# Patient Record
Sex: MALE | Race: BLACK OR AFRICAN AMERICAN | Employment: UNEMPLOYED | URBAN - METROPOLITAN AREA
[De-identification: names, ages, dates, MRNs, and addresses within clinical notes are randomized per-mention and may not be internally consistent; named-entity substitution may affect disease eponyms.]

---

## 2017-04-15 PROCEDURE — 99283 EMERGENCY DEPT VISIT LOW MDM: CPT

## 2017-04-16 ENCOUNTER — HOSPITAL ENCOUNTER (EMERGENCY)
Age: 57
Discharge: HOME | End: 2017-04-16
Attending: EMERGENCY MEDICINE | Admitting: EMERGENCY MEDICINE
Payer: SELF-PAY

## 2017-04-16 VITALS
TEMPERATURE: 98 F | HEIGHT: 71 IN | HEART RATE: 63 BPM | DIASTOLIC BLOOD PRESSURE: 81 MMHG | RESPIRATION RATE: 16 BRPM | BODY MASS INDEX: 27.44 KG/M2 | OXYGEN SATURATION: 98 % | WEIGHT: 196 LBS | SYSTOLIC BLOOD PRESSURE: 122 MMHG

## 2017-04-16 DIAGNOSIS — B02.9 HERPES ZOSTER WITHOUT COMPLICATION: Primary | ICD-10-CM

## 2017-04-16 PROCEDURE — 74011250637 HC RX REV CODE- 250/637: Performed by: EMERGENCY MEDICINE

## 2017-04-16 RX ORDER — OXYCODONE AND ACETAMINOPHEN 5; 325 MG/1; MG/1
1 TABLET ORAL
Qty: 12 TAB | Refills: 0 | Status: SHIPPED | OUTPATIENT
Start: 2017-04-16 | End: 2022-09-12 | Stop reason: ALTCHOICE

## 2017-04-16 RX ORDER — OXYCODONE AND ACETAMINOPHEN 5; 325 MG/1; MG/1
1 TABLET ORAL
Status: COMPLETED | OUTPATIENT
Start: 2017-04-16 | End: 2017-04-16

## 2017-04-16 RX ADMIN — OXYCODONE HYDROCHLORIDE AND ACETAMINOPHEN 1 TABLET: 5; 325 TABLET ORAL at 04:42

## 2017-04-16 NOTE — ED PROVIDER NOTES
HPI Comments:   4:17 AM Atlantas Hattie Sandifer is a 61 y.o. male, who presents to the ED for the evaluation of painful pruritic rash to his chest and back, onset about 4 days ago. Denies taking anything for pain. No relieving or exacerbating factors. No other complaints at this time. PCP: None     The history is provided by the patient. History reviewed. No pertinent past medical history. History reviewed. No pertinent surgical history. History reviewed. No pertinent family history. Social History     Social History    Marital status: SINGLE     Spouse name: N/A    Number of children: N/A    Years of education: N/A     Occupational History    Not on file. Social History Main Topics    Smoking status: Former Smoker    Smokeless tobacco: Not on file    Alcohol use Yes    Drug use: Not on file    Sexual activity: Not on file     Other Topics Concern    Not on file     Social History Narrative    No narrative on file         ALLERGIES: Review of patient's allergies indicates no known allergies. Review of Systems   Constitutional: Negative for fever. HENT: Negative for congestion. Respiratory: Negative for cough and shortness of breath. Cardiovascular: Negative for chest pain and leg swelling. Gastrointestinal: Negative for abdominal pain, nausea and vomiting. Genitourinary: Negative for dysuria. Musculoskeletal: Positive for myalgias. Skin: Positive for rash. Neurological: Negative for light-headedness and headaches. All other systems reviewed and are negative. Vitals:    04/15/17 2323 04/16/17 0502   BP: 128/78 122/81   Pulse: 97 63   Resp: 18 16   Temp: 98.2 °F (36.8 °C) 98 °F (36.7 °C)   SpO2: 96% 98%   Weight: 88.9 kg (196 lb)    Height: 5' 11\" (1.803 m)         96% within normal limits     Physical Exam   Constitutional: He is oriented to person, place, and time. No distress. HENT:   Head: Atraumatic.    Eyes: Conjunctivae are normal.   Neck: Normal range of motion. Neck supple. Cardiovascular: Normal rate, regular rhythm and normal heart sounds. Pulmonary/Chest: Effort normal and breath sounds normal. No respiratory distress. He exhibits no tenderness. Abdominal: Soft. Bowel sounds are normal. He exhibits no distension. There is no tenderness. There is no rebound and no guarding. Musculoskeletal: Normal range of motion. He exhibits no edema or tenderness. Neurological: He is alert and oriented to person, place, and time. Skin: Skin is warm and dry. Rash noted. Zoster rash distributed along T4-T5   Psychiatric: He has a normal mood and affect. Nursing note and vitals reviewed. MDM  Number of Diagnoses or Management Options  Herpes zoster without complication:   Diagnosis management comments: Jesse Hyde is a 61 y.o. male presenting with zoster x 4 days. No further work up indicated at this time. Handout provided. Return precautions discussed. Patient stated verbal understanding and agrees with course and plan. ED Course       Procedures           Vitals:  Patient Vitals for the past 12 hrs:   Temp Pulse Resp BP SpO2   04/16/17 0502 98 °F (36.7 °C) 63 16 122/81 98 %   04/15/17 2323 98.2 °F (36.8 °C) 97 18 128/78 96 %   96% within normal limits       Progress notes, Consult notes or additional Procedure notes:   4:22 AM: I have reassessed the patient and discussed their results and diagnosis. Pt will be discharged in stable condition. Patient understands and verbalizes agreement with plan. Disposition:  Diagnosis:   1. Herpes zoster without complication        Disposition: discharged home in stable condition        SCRIBE ATTESTATION STATEMENT  Documented by: Patrick Garcia. Zakia Escalante for, and in the presence of, Constantine Mortimer, MD 4:18 AM     Signed by: Patrick Garcia.  Stacy Ray, 4/16/2017 4:18 AM     PROVIDER ATTESTATION STATEMENT  I personally performed the services described in the documentation, reviewed the documentation, as recorded by the scribe in my presence, and it accurately and completely records my words and actions.   Rin Goodson MD

## 2017-08-11 NOTE — DISCHARGE INSTRUCTIONS

## 2019-01-15 ENCOUNTER — HOSPITAL ENCOUNTER (OUTPATIENT)
Dept: PHYSICAL THERAPY | Age: 59
Discharge: HOME | End: 2019-01-15
Payer: MEDICAID

## 2019-01-15 PROCEDURE — 97530 THERAPEUTIC ACTIVITIES: CPT

## 2019-01-15 PROCEDURE — 97162 PT EVAL MOD COMPLEX 30 MIN: CPT

## 2019-01-15 NOTE — PROGRESS NOTES
PT DAILY TREATMENT NOTE 3-16    Patient Name: Kevin Fitzpatrick  Date:1/15/2019  : 1960  [x]  Patient  Verified  Payor: BLUE CROSS MEDICAID / Plan: [address] [place] [address] / Product Type: Managed Care Medicaid /    In time:12:20 pm  Out time:1:00 pm   Total Treatment Time (min): 40  Visit #: 1 of 10    Treatment Area: No admission diagnoses are documented for this encounter.     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 with exception of left thumb  Pt reported that had sepsis due to tick bite - per pt had infection transferred from rabit that tick had bitten previously   Pt reports that has to walk, stand and ascend<>descend stairs  Pt expressed goal of going to [employer] and progressing exercises  Has SPC that uses daily  Has electric WC at home and rollator   orthotics does prosthetics  Has locking pin prosthetic   Has frequent phantom limb pain primarily in LE  PLOF ambulation with SPC with intermittent use of electric wheelchair      Pain:  _7__/10 max       __4-5_/10 min     _6___/10 now    Location: primarily in LE, residual limbs, hands, finger tips     [] Sharp    [] Dull      [] Burning     []  Aching     [] Throbbing      [x] Tingling     [x] Other:  numbness      [x]  Constant                   [] Intermittent        Previous treatment:   Inpatient rehab, outpatient rehab    PMHX: PMHx/Surgical Hx:  please see past medical hx    Social/Recreation/Work: Work Hx: pt currently on disability - wants to return to [facility]  Living Situation: lives with roommate on 2nd floor apartment  Recreational Activities: pt expressed that wants to progress exercise routine at [address] [facility] [address]   Prior to infection basketball      Patient Goal(s): \"I want to get stronger. Help me with a routine at the [facility]. \"    Barriers: []pain []financial []time []transportation []other  Motivation: high  Substance use: []Alcohol []Tobacco []other:   FABQ Score: []low []elevate  Cognition: A & O x 4  Other      OBJECTIVE    30 min [x]Eval                  []Re-Eval              With   [] TE   [x] TA - 10 min    [] neuro   [] other: Patient Education: [x] Review HEP    [] Progressed/Changed HEP based on:   [] positioning   [] body mechanics   [] transfers   [] heat/ice application    [x] other:  Pt education regarding exam findings, anatomy involved and POC      Other Objective/Functional Measures: Movement/gait:  Walks intermittently with SPC - reports that primarily uses SPC in right hand when uses it     Visual Inspection: Thoracic: flattened  Lumbar: appears mildly decreased  Shoulder/Scapula: abducted bilaterally  Stands with slight hip flexion and forward lean  Incisions: residual limbs well healed, no skin breakdown  Left residual limb longer than right     Palpation:                            AROM/PROM Right Left   Hip Flex 110 100   Hip Ext 10 4                         Strength Right Left   Quad set strong strong   Hip flex 5 5   Hip ext  4 4   Glut med 4 4   Glut max 4 4            Special Tests Right Left                                   Balance Right Left   SLS unable unable        Romberg EO: 30 sec     Romberg EC: 13 sec                     Other Comments: Standing Forward Flexion 6 in with left lean and right hand on prosthetic  5 time sit<>stand: 24.28 sec from 18 in surface with decreased control of decent. Heart Rate: 66 bpm  BP: 118/80 mmHg   HG IR Right: 45 Left 52      Pain Level (0-10 scale) post treatment: 6    ASSESSMENT/Changes in Function:   Pt is a 60 y.o. Male presenting to PT with expressed goal of increasing strength and improving gait.  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 Pt reports result of sepsis following tick bite in 2017. Pt presents to PT with bilateral BKA and use of prosthetics and SPC for ambulation. Prior to sepsis pt was an active adult with no medical comorbidities. Objective findings include BP and HR WLF, residual limbs well healed and proficient with donning/doffing of prosthetics. pt is lacking Hip Ext bilaterally and expect significant glut and hamstring inhibition. Pt also presents with decreased HG IR and increased thoracic lumbar ext. Pt could benefit from skilled PT to address posture, ROM, gait and strength. Patient will continue to benefit from skilled PT services to modify and progress therapeutic interventions, address functional mobility deficits, address ROM deficits, address strength deficits, analyze and address soft tissue restrictions, analyze and cue movement patterns, analyze and modify body mechanics/ergonomics, assess and modify postural abnormalities, address imbalance/dizziness and instruct in home and community integration to attain remaining goals. [x]  See Plan of Care  []  See progress note/recertification  []  See Discharge Summary         Progress towards goals / Updated goals:  Short Term Goals: STG- To be accomplished in 2 week(s):  1. Pt will be independent with HEP to encourage prophylaxis. Eval:held due to time  Current: NA    Long Term Goals: LTG- To be accomplished in 4 week(s):  1. Pt will demonstrate ability to bridge to full height >10 times without use of lumbar paraspinals to indicate functional glut and hamstring strength. .  Eval:increased use of paraspinals   Current: NA    2. Pt will be able to ambulate >10 minutes with LRAD and without pain. .  Eval:tolerance at 5-7 minutes  Current: NA    3. Pt will improve 5 time sit<>stand test to 15 sec without use of E to indicate imporved functional mobility.   Eval:24.28 sec from 18 in surface with decreased control of decent. Current: NA        PLAN  [x]  Upgrade activities as tolerated     []  Continue plan of care  []  Update interventions per flow sheet       []  Discharge due to:_  []  Other:_      Ernst Lopez PT, DPT 1/15/2019  12:23 PM    No future appointments.

## 2019-01-16 NOTE — PROGRESS NOTES
[facility] at the [address], [place], [address] [facility]  Phone: [phone]      Fax:  [phone]       Plan of Care/ Statement of Necessity for Physical Therapy Services      Patient name: Bobby Bullard Start of Care: 1/15/2019   Referral source: Referred, Self, MD : 1960    Medical Diagnosis: Gait abnormality [R26.9]   Onset Date:    Treatment Diagnosis: gait    Prior Hospitalization: see medical history Provider#: [id number]   Medications: Verified on Patient summary List    Comorbidities: hx of sepsis, previous surgery   Prior Level of Function: ambulation with SPC with intermittent use of electric wheelchair      The Plan of Care and following information is based on the information from the initial evaluation. Assessment/ key information:   Pt is a 60 y.o. Male presenting to PT with expressed goal of increasing strength and improving gait. 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 Pt reports result of sepsis following tick bite in 2017. Pt presents to PT with bilateral BKA and use of prosthetics and SPC for ambulation. Prior to sepsis pt was an active adult with no medical comorbidities. Objective findings include BP and HR WLF, residual limbs well healed and proficient with donning/doffing of prosthetics. pt is lacking Hip Ext bilaterally and expect significant glut and hamstring inhibition. Pt also presents with decreased HG IR and increased thoracic lumbar ext. Pt could benefit from skilled PT to address posture, ROM, gait and strength.      Evaluation Complexity History MEDIUM  Complexity : 1-2 comorbidities / personal factors will impact the outcome/ POC ; Examination HIGH Complexity : 4+ Standardized tests and measures addressing body structure, function, activity limitation and / or participation in recreation  ;Presentation MEDIUM Complexity : Evolving with changing characteristics ;Clinical Decision Making MEDIUM Complexity : FOTO score of 26-74  Overall Complexity Rating: MEDIUM  Problem List: pain affecting function, decrease ROM, decrease strength, impaired gait/ balance, decrease ADL/ functional abilitiies, decrease activity tolerance, decrease flexibility/ joint mobility and decrease transfer abilities   Treatment Plan may include any combination of the following: Therapeutic exercise, Therapeutic activities, Neuromuscular re-education, Physical agent/modality, Gait/balance training, Manual therapy, Patient education, Self Care training, Functional mobility training and Stair training  Patient / Family readiness to learn indicated by: asking questions, trying to perform skills and interest  Persons(s) to be included in education: patient (P)  Barriers to Learning/Limitations: None  Patient Goal (s): I want to get stronger. Help me with a routine at the [facility].   Patient Self Reported Health Status: good  Rehabilitation Potential: good    Short Term Goals: STG- To be accomplished in 2 week(s):  1. Pt will be independent with HEP to encourage prophylaxis. Eval:held due to time  Current: NA     Long Term Goals: LTG- To be accomplished in 4 week(s):  1. Pt will demonstrate ability to bridge to full height >10 times without use of lumbar paraspinals to indicate functional glut and hamstring strength. .  Eval:increased use of paraspinals   Current: NA     2.  Pt will be able to ambulate >10 minutes with LRAD and without pain. .  Eval:tolerance at 5-7 minutes  Current: NA     3.  Pt will improve 5 time sit<>stand test to 15 sec without use of E to indicate imporved functional mobility. Eval:24.28 sec from 18 in surface with decreased control of decent. Current: NA    Frequency / Duration: Patient to be seen 2 times per week for 4 weeks.     Patient/ Caregiver education and instruction: Diagnosis, prognosis, self care, activity modification and exercises   [x]  Plan of care has been reviewed with PTA    Martha Ferris, PT, DPT 1/15/2019 7:28 PM  _____________________________________________________________________  I certify that the above Therapy Services are being furnished while the patient is under my care. I agree with the treatment plan and certify that this therapy is necessary.     Physician's Signature:____________Date:_________TIME:________    [employer], Date and Time must be completed for valid certification **    Please sign and return to [facility] at the [address], [place], [address] [facility]       Phone: [phone]      Fax:  [phone]

## 2019-01-23 ENCOUNTER — HOSPITAL ENCOUNTER (OUTPATIENT)
Dept: PHYSICAL THERAPY | Age: 59
Discharge: HOME | End: 2019-01-23
Payer: MEDICAID

## 2019-01-23 PROCEDURE — 97161 PT EVAL LOW COMPLEX 20 MIN: CPT

## 2019-01-23 PROCEDURE — 97110 THERAPEUTIC EXERCISES: CPT

## 2019-01-23 PROCEDURE — 97112 NEUROMUSCULAR REEDUCATION: CPT

## 2019-01-23 PROCEDURE — 97530 THERAPEUTIC ACTIVITIES: CPT

## 2019-01-23 NOTE — PROGRESS NOTES
PT DAILY TREATMENT NOTE     Patient Name: Kimmy Sosa  Date:2019  : 1960  [x]  Patient  Verified  Payor: BLUE CROSS MEDICAID / Plan: [facility] / Product Type: Managed Care Medicaid /    In time:12:00 pm  Out time:1:03 pm   Total Treatment Time (min): 60 (waiting on PT)  Visit #: 2 of 8    Treatment Area: Gait abnormality [R26.9]    SUBJECTIVE  Pain Level (0-10 scale): 0  Any medication changes, allergies to medications, adverse drug reactions, diagnosis change, or new procedure performed?: [x] No    [] Yes (see summary sheet for update)  Subjective functional status/changes:   [] No changes reported  \"You know I am not sure if I have pain. \"    OBJECTIVE    Modality rationale:    Min Type Additional Details    [] Estim:  []Unatt       []IFC  []Premod                        []Other:  []w/ice   []w/heat  Position:  Location:    [] Estim: []Att    []TENS instruct  []NMES                    []Other:  []w/US   []w/ice   []w/heat  Position:  Location:    []  Traction: [] Cervical       []Lumbar                       [] Prone          []Supine                       []Intermittent   []Continuous Lbs:  [] before manual  [] after manual    []  Ultrasound: []Continuous   [] Pulsed                           []1MHz   []3MHz W/cm2:  Location:    []  Iontophoresis with dexamethasone         Location: [] Take home patch   [] In clinic    []  Ice     []  heat  []  Ice massage  []  Laser   []  Anodyne Position:  Location:    []  Laser with stim  []  Other:  Position:  Location:    []  Vasopneumatic Device Pressure:       [] lo [] med [] hi   Temperature: [] lo [] med [] hi   [] Skin assessment post-treatment:  []intact []redness- no adverse reaction    []redness - adverse reaction:     40 min Therapeutic Exercise:  [x] See flow sheet :   Rationale: increase ROM, increase strength, improve coordination, improve balance and increase proprioception to improve the patients ability to perform daily activities with decreased pain and symptom levels    10 min Therapeutic Activity:  [x]  See flow sheet :   Rationale: increase ROM, increase strength, improve coordination, improve balance and increase proprioception  to improve the patients ability to perform daily activities with decreased pain and symptom levels    10 min Neuromuscular Re-education:  [x]  See flow sheet :   Rationale: increase ROM, increase strength, improve coordination, improve balance and increase proprioception  to improve the patients ability to perform daily activities with decreased pain and symptom levels            With   [] TE   [] TA   [] neuro   [] other: Patient Education: [x] Review HEP    [] Progressed/Changed HEP based on:   [] positioning   [] body mechanics   [] transfers   [] heat/ice application    [] other:      Other Objective/Functional Measures:   Unable to perform half-kneeling without external support  Challenged with maintaining thoracic retraction in plank and quadruped     Pain Level (0-10 scale) post treatment: 0    ASSESSMENT/Changes in Function:   Pt appropriately challenged with interventions. Reported glut fatigue with hip hikes. Requested to practice shooting basketball - incorporated as balance retraining. Patient will continue to benefit from skilled PT services to modify and progress therapeutic interventions, address functional mobility deficits, address ROM deficits, address strength deficits, analyze and address soft tissue restrictions, analyze and cue movement patterns, analyze and modify body mechanics/ergonomics, assess and modify postural abnormalities and instruct in home and community integration to attain remaining goals.      []  See Plan of Care  []  See progress note/recertification  []  See Discharge Summary         Progress towards goals / Updated goals:  Short Term Goals: STG- To be accomplished in 2 week(s):  1.  Pt will be independent with HEP to encourage prophylaxis. Eval:held due to time  Current: discussed planks for home     Long Term Goals: LTG- To be accomplished in 4 week(s):  1.  Pt will demonstrate ability to bridge to full height >10 times without use of lumbar paraspinals to indicate functional glut and hamstring strength. .  Eval:increased use of paraspinals   Current: initiated bridging with PPT today - progressing      2.  Pt will be able to ambulate >10 minutes with LRAD and without pain. .  Eval:tolerance at 5-7 minutes  Current: NA     3.  Pt will improve 5 time sit<>stand test to 15 sec without use of E to indicate imporved functional mobility. Eval:24.28 sec from 18 in surface with decreased control of decent.   Current: NA      PLAN  [x]  Upgrade activities as tolerated     []  Continue plan of care  []  Update interventions per flow sheet       []  Discharge due to:_  []  Other:_      Karyn Wolfe PT, DPT 1/23/2019  12:19 PM    Future Appointments   Date Time Provider [place]   1/24/2019 11:30 AM Wilfredo Prakash PT, DPT [facility]   1/28/2019  8:30 AM Wilfredo Prakash PT, DPT [facility]   2/1/2019  8:30 AM Robbie Mae [facility]   2/4/2019  8:30 AM Wilfredo Prakash PT, DPT [facility]   2/7/2019  8:30 AM Robbie Mae [facility]   2/11/2019  8:30 AM Wilfredo Prakash PT, DPT [facility]   2/14/2019  8:30 AM Robbie Mae [facility]

## 2019-01-24 ENCOUNTER — APPOINTMENT (OUTPATIENT)
Dept: PHYSICAL THERAPY | Age: 59
End: 2019-01-24
Payer: MEDICAID

## 2019-01-28 ENCOUNTER — HOSPITAL ENCOUNTER (OUTPATIENT)
Dept: PHYSICAL THERAPY | Age: 59
Discharge: HOME | End: 2019-01-28
Payer: MEDICAID

## 2019-01-28 PROCEDURE — 97530 THERAPEUTIC ACTIVITIES: CPT

## 2019-01-28 PROCEDURE — 97110 THERAPEUTIC EXERCISES: CPT

## 2019-01-28 PROCEDURE — 97112 NEUROMUSCULAR REEDUCATION: CPT

## 2019-01-28 NOTE — PROGRESS NOTES
PT DAILY TREATMENT NOTE     Patient Name: Juanito Martin  Date:2019  : 1960  [x]  Patient  Verified  Payor: BLUE CROSS MEDICAID / Plan: [facility] [employer] / Product Type: Managed Care Medicaid /    In time:8:30 am  Out time:9:30 am  Total Treatment Time (min): 60 (45 min direct PT supervision)  Visit #: 3 of 8    Treatment Area: Gait abnormality [R26.9]    SUBJECTIVE  Pain Level (0-10 scale): 5  Any medication changes, allergies to medications, adverse drug reactions, diagnosis change, or new procedure performed?: [x] No    [] Yes (see summary sheet for update)  Subjective functional status/changes:   [] No changes reported  \"I was sore the next day in my legs and buttocks. \"    OBJECTIVE    Modality rationale:    Min Type Additional Details    [] Estim:  []Unatt       []IFC  []Premod                        []Other:  []w/ice   []w/heat  Position:  Location:    [] Estim: []Att    []TENS instruct  []NMES                    []Other:  []w/US   []w/ice   []w/heat  Position:  Location:    []  Traction: [] Cervical       []Lumbar                       [] Prone          []Supine                       []Intermittent   []Continuous Lbs:  [] before manual  [] after manual    []  Ultrasound: []Continuous   [] Pulsed                           []1MHz   []3MHz W/cm2:  Location:    []  Iontophoresis with dexamethasone         Location: [] Take home patch   [] In clinic    []  Ice     []  heat  []  Ice massage  []  Laser   []  Anodyne Position:  Location:    []  Laser with stim  []  Other:  Position:  Location:    []  Vasopneumatic Device Pressure:       [] lo [] med [] hi   Temperature: [] lo [] med [] hi   [] Skin assessment post-treatment:  []intact []redness- no adverse reaction    []redness - adverse reaction:     30 min Therapeutic Exercise:  [x] See flow sheet :   Rationale: increase ROM, increase strength, improve coordination, improve balance and increase proprioception to improve the patients ability to perform daily activities with decreased pain and symptom levels     15 min Therapeutic Activity:  [x]  See flow sheet :   Rationale: increase ROM, increase strength, improve coordination, improve balance and increase proprioception  to improve the patients ability to perform daily activities with decreased pain and symptom levels     15 min Neuromuscular Re-education:  [x]  See flow sheet :   Rationale: increase ROM, increase strength, improve coordination, improve balance and increase proprioception  to improve the patients ability to perform daily activities with decreased pain and symptom levels          With   [] TE   [] TA   [] neuro   [] other: Patient Education: [x] Review HEP    [] Progressed/Changed HEP based on:   [] positioning   [] body mechanics   [] transfers   [] heat/ice application    [] other:      Other Objective/Functional Measures:   Required HHA with 1/2 kneeling     Pain Level (0-10 scale) post treatment: 5    ASSESSMENT/Changes in Function:   Pt reported increased muscle soreness after last session. Challenged with 1/2 kneeling and maintaining PPT with bridges. Pt requested that PT contact [employer]. Plan to call at earliest convenience and will document in communications log. Patient will continue to benefit from skilled PT services to modify and progress therapeutic interventions, address functional mobility deficits, address ROM deficits, address strength deficits, analyze and address soft tissue restrictions, analyze and cue movement patterns, analyze and modify body mechanics/ergonomics, assess and modify postural abnormalities, address imbalance/dizziness and instruct in home and community integration to attain remaining goals.      []  See Plan of Care  []  See progress note/recertification  []  See Discharge Summary         Progress towards goals / Updated goals:  Short Term Goals: STG- To be accomplished in 2 week(s):  1.  Pt will be independent with HEP to encourage prophylaxis. Eval:held due to time  Current: discussed planks for home     Long Term Goals: LTG- To be accomplished in 4 week(s):  1.  Pt will demonstrate ability to bridge to full height >10 times without use of lumbar paraspinals to indicate functional glut and hamstring strength. .  Eval:increased use of paraspinals   Current:  progressing- reported glut fatigue with activities     2.  Pt will be able to ambulate >10 minutes with LRAD and without pain. .  Eval:tolerance at 5-7 minutes  Current: NA     3.  Pt will improve 5 time sit<>stand test to 15 sec without use of E to indicate imporved functional mobility. Eval:24.28 sec from 18 in surface with decreased control of decent.   Current: NA    PLAN  [x]  Upgrade activities as tolerated     []  Continue plan of care  []  Update interventions per flow sheet       []  Discharge due to:_  []  Other:_      Shaquille Conway PT, DPT 1/28/2019  8:38 AM    Future Appointments   Date Time Provider Miguel Watt   2/1/2019  8:30 AM Isabella Mae [facility]   2/4/2019  8:30 AM Yuliet Olson PT, DPT [facility]   2/7/2019  8:30 AM Isabella Mae THE [facility]   2/11/2019  8:30 AM Yuliet Olson PT, DPT [facility]PS[facility]AN[facility]   2/14/2019  8:30 AM Isabella Mae THE [facility]

## 2019-02-01 ENCOUNTER — HOSPITAL ENCOUNTER (OUTPATIENT)
Dept: PHYSICAL THERAPY | Age: 59
Discharge: HOME | End: 2019-02-01
Payer: MEDICAID

## 2019-02-01 PROCEDURE — 97112 NEUROMUSCULAR REEDUCATION: CPT

## 2019-02-01 PROCEDURE — 97530 THERAPEUTIC ACTIVITIES: CPT

## 2019-02-01 PROCEDURE — 97110 THERAPEUTIC EXERCISES: CPT

## 2019-02-01 NOTE — PROGRESS NOTES
PT DAILY TREATMENT NOTE 1216    Patient Name: Lars Leisa  Date:2019  : 1960  [x]  Patient  Verified  Payor: BLUE CROSS MEDICAID / Plan: [facility] [employer] / Product Type: Managed Care Medicaid /    In time:8:30  Out time:9:35  Total Treatment Time (min): 65  Visit #: 4 of 8    Treatment Area: Gait abnormality [R26.9]    SUBJECTIVE  Pain Level (0-10 scale): 6-7  Any medication changes, allergies to medications, adverse drug reactions, diagnosis change, or new procedure performed?: [x] No    [] Yes (see summary sheet for update)  Subjective functional status/changes:   [] No changes reported  \"those planks are hard. I need the light on my wc for nightime when I do [employer] study and community things. \"    OBJECTIVE      35 min Therapeutic Exercise:  [x] See flow sheet :   Rationale: increase ROM and increase strength to improve the patients ability to perform daily activities with decreased pain and symptom levels    10 min Therapeutic Activity:  [x]  See flow sheet :   Rationale: improve coordination, improve balance and increase proprioception  to improve the patients ability to perform daily activities with decreased pain and symptom levels     20 min Neuromuscular Re-education:  [x]  See flow sheet :   Rationale: increase strength, improve coordination, improve balance and increase proprioception  to improve the patients ability to perform daily activities with decreased pain and symptom levels          With   [] TE   [] TA   [] neuro   [] other: Patient Education: [x] Review HEP    [] Progressed/Changed HEP based on:   [] positioning   [] body mechanics   [] transfers   [] heat/ice application    [] other:      Other Objective/Functional Measures:   Increased lumbar extension in QP  No LOB with rotational med ball in // bars however SBA by therapist     Pain Level (0-10 scale) post treatment: 5-5    ASSESSMENT/Changes in Function: Good tolerance to exercises with fatigue in gluts reported. Continues to be challenged with maintaining neutral spine with QP. Patient will continue to benefit from skilled PT services to modify and progress therapeutic interventions, address functional mobility deficits, address strength deficits, analyze and cue movement patterns, analyze and modify body mechanics/ergonomics, assess and modify postural abnormalities, address imbalance/dizziness and instruct in home and community integration to attain remaining goals. []  See Plan of Care  []  See progress note/recertification  []  See Discharge Summary         Progress towards goals / Updated goals:  Short Term Goals: STG- To be accomplished in 2 week(s):  1.  Pt will be independent with HEP to encourage prophylaxis. Eval:held due to time  Current: partial compliance      Long Term Goals: LTG- To be accomplished in 4 week(s):  1.  Pt will demonstrate ability to bridge to full height >10 times without use of lumbar paraspinals to indicate functional glut and hamstring strength. .  Eval:increased use of paraspinals   Current:  progressing- reported glut fatigue with activities     2.  Pt will be able to ambulate >10 minutes with LRAD and without pain. .  Eval:tolerance at 5-7 minutes  Current: NA     3.  Pt will improve 5 time sit<>stand test to 15 sec without use of E to indicate imporved functional mobility. Eval:24.28 sec from 18 in surface with decreased control of decent.   Current: NA      PLAN  [x]  Upgrade activities as tolerated     [x]  Continue plan of care  []  Update interventions per flow sheet       []  Discharge due to:_   []  Other:_      Scar Del Rosario 2/1/2019  8:39 AM    Future Appointments   Date Time Provider Miguel Watt   2/4/2019  8:30 AM Diana Lezama PT, DPT [facility]PT[facility]   2/7/2019  8:30 AM Vivian Mae [facility]   2/11/2019  8:30 AM Diana Lezama PT, DPT [facility]   2/14/2019  8:30 AM Vivian MaeHPSTEFANO [facility]   2/18/2019  8:30 AM Diana Lezama PT, DPT [facility]

## 2019-02-04 ENCOUNTER — APPOINTMENT (OUTPATIENT)
Dept: PHYSICAL THERAPY | Age: 59
End: 2019-02-04
Payer: MEDICAID

## 2019-02-07 ENCOUNTER — HOSPITAL ENCOUNTER (OUTPATIENT)
Dept: PHYSICAL THERAPY | Age: 59
Discharge: HOME | End: 2019-02-07
Payer: MEDICAID

## 2019-02-07 PROCEDURE — 97110 THERAPEUTIC EXERCISES: CPT

## 2019-02-07 PROCEDURE — 97530 THERAPEUTIC ACTIVITIES: CPT

## 2019-02-07 NOTE — PROGRESS NOTES
PT DAILY TREATMENT NOTE     Patient Name: Cira Garzon  Date:2019  : 1960  [x]  Patient  Verified  Payor: BLUE CROSS MEDICAID / Plan: [facility] [employer] [employer]ERS [employer] / Product Type: Managed Care Medicaid /    In time:8:30  Out time:9:33  Total Treatment Time (min): 63  Visit #: 5 of 8    Treatment Area: Gait abnormality [R26.9]    SUBJECTIVE  Pain Level (0-10 scale): 5  Any medication changes, allergies to medications, adverse drug reactions, diagnosis change, or new procedure performed?: [x] No    [] Yes (see summary sheet for update)  Subjective functional status/changes:   [] No changes reported  \"I always have pain. \"    OBJECTIVE        48 min Therapeutic Exercise:  [x] See flow sheet :   Rationale: increase ROM and increase strength to improve the patients ability to perform daily activities with decreased pain and symptom levels    15 min Therapeutic Activity:  [x]  See flow sheet :   Rationale: improve coordination, improve balance and increase proprioception  to improve the patients ability to perform daily activities with decreased pain and symptom levels           With   [] TE   [] TA   [] neuro   [] other: Patient Education: [x] Review HEP    [] Progressed/Changed HEP based on:   [] positioning   [] body mechanics   [] transfers   [] heat/ice application    [] other:      Other Objective/Functional Measures:   SBA with rotational med ball on foam  Increased lumbar extension with QP still  Decreased control with sit to stands     Pain Level (0-10 scale) post treatment: 4    ASSESSMENT/Changes in Function: Good tolerance to exercises with decreased pain at end of session. Still very challenged in QP with maintaining neutral spine. Unable to control descent with sit to stands with cues to shift more anteriorly.      Patient will continue to benefit from skilled PT services to modify and progress therapeutic interventions, address functional mobility deficits, address strength deficits, analyze and modify body mechanics/ergonomics, assess and modify postural abnormalities, address imbalance/dizziness and instruct in home and community integration to attain remaining goals. []  See Plan of Care  []  See progress note/recertification  []  See Discharge Summary         Progress towards goals / Updated goals:  Short Term Goals: STG- To be accomplished in 2 week(s):  1.  Pt will be independent with HEP to encourage prophylaxis. Eval:held due to time  Current: partial compliance      Long Term Goals: LTG- To be accomplished in 4 week(s):  1.  Pt will demonstrate ability to bridge to full height >10 times without use of lumbar paraspinals to indicate functional glut and hamstring strength. .  Eval:increased use of paraspinals   Current:  progressing- reported glut fatigue with activities     2.  Pt will be able to ambulate >10 minutes with LRAD and without pain. .  Eval:tolerance at 5-7 minutes  Current: NA     3.  Pt will improve 5 time sit<>stand test to 15 sec without use of E to indicate imporved functional mobility. Eval:24.28 sec from 18 in surface with decreased control of decent.   Current: continues to be challenged with controlling descent        PLAN  [x]  Upgrade activities as tolerated     [x]  Continue plan of care  []  Update interventions per flow sheet       []  Discharge due to:_  []  Other:_      Evita Tolentino 2/7/2019  9:03 AM    Future Appointments   Date Time Provider Miguel Watt   2/8/2019  1:00 PM Rosina Yancey PT, DPT [facility]   2/11/2019  8:30 AM Rosina Yancey PT, DPT [facility]   2/14/2019  8:30 AM Earnest Mae THE [facility]   2/18/2019  8:30 AM Rosina Yancey PT, DPT [facility]

## 2019-02-08 ENCOUNTER — HOSPITAL ENCOUNTER (OUTPATIENT)
Dept: PHYSICAL THERAPY | Age: 59
Discharge: HOME | End: 2019-02-08
Payer: MEDICAID

## 2019-02-08 PROCEDURE — 97110 THERAPEUTIC EXERCISES: CPT

## 2019-02-08 PROCEDURE — 97530 THERAPEUTIC ACTIVITIES: CPT

## 2019-02-08 PROCEDURE — 97112 NEUROMUSCULAR REEDUCATION: CPT

## 2019-02-08 NOTE — PROGRESS NOTES
PT DAILY TREATMENT NOTE     Patient Name: Octavio Barkley  Date:2019  : 1960  [x]  Patient  Verified  Payor: BLUE CROSS MEDICAID / Plan: [facility] HEALTHKEEPERS PLUS / Product Type: Managed Care Medicaid /    In time:1:00 pm  Out time:1:53 pm   Total Treatment Time (min): 48  Visit #: 6 of 8    Treatment Area: Gait abnormality [R26.9]    SUBJECTIVE  Pain Level (0-10 scale): 6  Any medication changes, allergies to medications, adverse drug reactions, diagnosis change, or new procedure performed?: [x] No    [] Yes (see summary sheet for update)  Subjective functional status/changes:   [] No changes reported  \"My knee joint (right) was hurting last night. \"    OBJECTIVE    Modality rationale:    Min Type Additional Details    [] Estim:  []Unatt       []IFC  []Premod                        []Other:  []w/ice   []w/heat  Position:  Location:    [] Estim: []Att    []TENS instruct  []NMES                    []Other:  []w/US   []w/ice   []w/heat  Position:  Location:    []  Traction: [] Cervical       []Lumbar                       [] Prone          []Supine                       []Intermittent   []Continuous Lbs:  [] before manual  [] after manual    []  Ultrasound: []Continuous   [] Pulsed                           []1MHz   []3MHz W/cm2:  Location:    []  Iontophoresis with dexamethasone         Location: [] Take home patch   [] In clinic    []  Ice     []  heat  []  Ice massage  []  Laser   []  Anodyne Position:  Location:    []  Laser with stim  []  Other:  Position:  Location:    []  Vasopneumatic Device Pressure:       [] lo [] med [] hi   Temperature: [] lo [] med [] hi   [] Skin assessment post-treatment:  []intact []redness- no adverse reaction    []redness - adverse reaction:     33 min Therapeutic Exercise:  [x] See flow sheet :   Rationale: increase ROM, increase strength, improve coordination, improve balance and increase proprioception to improve the patients ability to perform daily activities with decreased pain and symptom levels     10 min Therapeutic Activity:  [x]  See flow sheet :   Rationale: increase ROM, increase strength, improve coordination, improve balance and increase proprioception  to improve the patients ability to perform daily activities with decreased pain and symptom levels     10 min Neuromuscular Re-education:  [x]  See flow sheet :   Rationale: increase ROM, increase strength, improve coordination, improve balance and increase proprioception  to improve the patients ability to perform daily activities with decreased pain and symptom levels                                             With   [] TE   [] TA   [] neuro   [] other: Patient Education: [x] Review HEP    [] Progressed/Changed HEP based on:   [] positioning   [] body mechanics   [] transfers   [] heat/ice application    [] other:      Other Objective/Functional Measures:   CGA with rot balance     Pain Level (0-10 scale) post treatment: 5    ASSESSMENT/Changes in Function:   Pt requested letter of necessity for WC headlights. Tolerated treatment session well. Very challenged with quadruped and planks. Patient will continue to benefit from skilled PT services to modify and progress therapeutic interventions, address functional mobility deficits, address ROM deficits, address strength deficits, analyze and address soft tissue restrictions, analyze and cue movement patterns, analyze and modify body mechanics/ergonomics, assess and modify postural abnormalities, address imbalance/dizziness and instruct in home and community integration to attain remaining goals. []  See Plan of Care  []  See progress note/recertification  []  See Discharge Summary         Progress towards goals / Updated goals:  Short Term Goals: STG- To be accomplished in 2 week(s):  1.  Pt will be independent with HEP to encourage prophylaxis.   Eval:held due to time  Current: partial compliance      Long Term Goals: LTG- To be accomplished in 4 week(s):  1.  Pt will demonstrate ability to bridge to full height >10 times without use of lumbar paraspinals to indicate functional glut and hamstring strength. .  Eval:increased use of paraspinals   Current:  progressing- reported glut fatigue with activities     2.  Pt will be able to ambulate >10 minutes with LRAD and without pain. .  Eval:tolerance at 5-7 minutes  Current: NA     3.  Pt will improve 5 time sit<>stand test to 15 sec without use of E to indicate imporved functional mobility. Eval:24.28 sec from 18 in surface with decreased control of decent.   Current: continues to be challenged with controlling descent      PLAN  [x]  Upgrade activities as tolerated     []  Continue plan of care  []  Update interventions per flow sheet       []  Discharge due to:_  []  Other:_      Elizabeth Ramsay, PT, DPT 2/8/2019  1:23 PM    Future Appointments   Date Time Provider Miguel Watt   2/11/2019  8:30 AM Alyse Arrieta PT, DPT [facility]   2/14/2019  8:30 AM Kelvin MaeHPTBW [facility]   2/18/2019  8:30 AM Alyse Arrieta PT, DPT [facility]

## 2019-02-11 ENCOUNTER — HOSPITAL ENCOUNTER (OUTPATIENT)
Dept: PHYSICAL THERAPY | Age: 59
Discharge: HOME | End: 2019-02-11
Payer: MEDICAID

## 2019-02-11 PROCEDURE — 97530 THERAPEUTIC ACTIVITIES: CPT

## 2019-02-11 PROCEDURE — 97110 THERAPEUTIC EXERCISES: CPT

## 2019-02-11 PROCEDURE — 97112 NEUROMUSCULAR REEDUCATION: CPT

## 2019-02-11 NOTE — PROGRESS NOTES
PT DAILY TREATMENT NOTE     Patient Name: Kathe Andrews  Date:2019  : 1960  [x]  Patient  Verified  Payor: BLUE CROSS MEDICAID / Plan: [facility] HEALTHKEEPERS PLUS / Product Type: Managed Care Medicaid /    In time:8:30 am  Out time:9:31 am   Total Treatment Time (min): 61  Visit #: 7 of 8    Treatment Area: Gait abnormality [R26.9]    SUBJECTIVE  Pain Level (0-10 scale): 5  Any medication changes, allergies to medications, adverse drug reactions, diagnosis change, or new procedure performed?: [x] No    [] Yes (see summary sheet for update)  Subjective functional status/changes:   [] No changes reported  \"I am about a 5 today. \"    OBJECTIVE    Modality rationale:    Min Type Additional Details    [] Estim:  []Unatt       []IFC  []Premod                        []Other:  []w/ice   []w/heat  Position:  Location:    [] Estim: []Att    []TENS instruct  []NMES                    []Other:  []w/US   []w/ice   []w/heat  Position:  Location:    []  Traction: [] Cervical       []Lumbar                       [] Prone          []Supine                       []Intermittent   []Continuous Lbs:  [] before manual  [] after manual    []  Ultrasound: []Continuous   [] Pulsed                           []1MHz   []3MHz W/cm2:  Location:    []  Iontophoresis with dexamethasone         Location: [] Take home patch   [] In clinic    []  Ice     []  heat  []  Ice massage  []  Laser   []  Anodyne Position:  Location:    []  Laser with stim  []  Other:  Position:  Location:    []  Vasopneumatic Device Pressure:       [] lo [] med [] hi   Temperature: [] lo [] med [] hi   [] Skin assessment post-treatment:  []intact []redness- no adverse reaction    []redness - adverse reaction:     41 min Therapeutic Exercise:  [x] See flow sheet :   Rationale: increase ROM, increase strength, improve coordination, improve balance and increase proprioception to improve the patients ability to perform daily activities with decreased pain and symptom levels     10 min Therapeutic Activity:  [x]  See flow sheet :   Rationale: increase ROM, increase strength, improve coordination, improve balance and increase proprioception  to improve the patients ability to perform daily activities with decreased pain and symptom levels     10 min Neuromuscular Re-education:  [x]  See flow sheet :   Rationale: increase ROM, increase strength, improve coordination, improve balance and increase proprioception  to improve the patients ability to perform daily activities with decreased pain and symptom levels           With   [] TE   [] TA   [] neuro   [] other: Patient Education: [x] Review HEP    [] Progressed/Changed HEP based on:   [] positioning   [] body mechanics   [] transfers   [] heat/ice application    [] other:      Other Objective/Functional Measures:   Sit<>stand 18 in - improved balance      Pain Level (0-10 scale) post treatment: 5    ASSESSMENT/Changes in Function:   Pt reported it is easier to get up and down off of the couch at home. Very challenged with quadruped and 1/2 kneeling. Patient will continue to benefit from skilled PT services to modify and progress therapeutic interventions, address functional mobility deficits, address ROM deficits, address strength deficits, analyze and address soft tissue restrictions, analyze and cue movement patterns, analyze and modify body mechanics/ergonomics, assess and modify postural abnormalities, address imbalance/dizziness and instruct in home and community integration to attain remaining goals. []  See Plan of Care  []  See progress note/recertification  []  See Discharge Summary         Progress towards goals / Updated goals:  Short Term Goals: STG- To be accomplished in 2 week(s):  1.  Pt will be independent with HEP to encourage prophylaxis.   Eval:held due to time  Current: partial compliance      Long Term Goals: LTG- To be accomplished in 4 week(s):  1.  Pt will demonstrate ability to bridge to full height >10 times without use of lumbar paraspinals to indicate functional glut and hamstring strength. .  Eval:increased use of paraspinals   Current:  progressing- reported glut fatigue with activities     2.  Pt will be able to ambulate >10 minutes with LRAD and without pain. .  Eval:tolerance at 5-7 minutes  Current: pt reports toleranceat ~7 min      3.  Pt will improve 5 time sit<>stand test to 15 sec without use of E to indicate imporved functional mobility. Eval:24.28 sec from 18 in surface with decreased control of decent.   Current: progressing with sit<>stand from 18 in surface, able to control descent     PLAN  [x]  Upgrade activities as tolerated     []  Continue plan of care  []  Update interventions per flow sheet       []  Discharge due to:_  []  Other:_      Henrry Ramey, PT, DPT 2/11/2019  8:42 AM    Future Appointments   Date Time Provider Miguel Watt   2/14/2019  8:30 AM Narayan Mae [facility]   2/18/2019  8:30 AM Elizabeth Grier, PT, DPT [facility]PTBW [facility]

## 2019-02-14 ENCOUNTER — HOSPITAL ENCOUNTER (OUTPATIENT)
Dept: PHYSICAL THERAPY | Age: 59
Discharge: HOME | End: 2019-02-14
Payer: MEDICAID

## 2019-02-14 PROCEDURE — 97110 THERAPEUTIC EXERCISES: CPT

## 2019-02-14 PROCEDURE — 97112 NEUROMUSCULAR REEDUCATION: CPT

## 2019-02-14 NOTE — PROGRESS NOTES
[facility] at the [address], [facility], [address] [facility]  Phone: [phone]      Fax:  [phone]    Progress Note  Patient name: Arianna Faustin Start of Care: 1/15/19   Referral source: Referred, MD Romulo : 1960   Medical/Treatment Diagnosis: Gait abnormality [R26.9] Onset Date:     Prior Hospitalization: see medical history Provider#: [id number]   Medications: Verified on Patient Summary List    Comorbidities: hx of sepsis, previous surgery  Prior Level of Function: ambulation with SPC with intermittent use of electric wheelchair    Visits from Start of Care: 8    Missed Visits: 1    Short Term Goals: STG- To be accomplished in 2 week(s):  1.  Pt will be independent with HEP to encourage prophylaxis. Eval:held due to time  Current: compliance per pt report - goal MET     Long Term Goals: LTG- To be accomplished in 4 week(s):  1.  Pt will demonstrate ability to bridge to full height >10 times without use of lumbar paraspinals to indicate functional glut and hamstring strength. .  Eval:increased use of paraspinals   Current: improving height however still using lumbar paraspinals - progressing      2.  Pt will be able to ambulate >10 minutes with LRAD and without pain. .  Eval:tolerance at 5-7 minutes  Current: pt reports tolerance at ~7 min - progressing        3.  Pt will improve 5 time sit<>stand test to 15 sec without use of E to indicate imporved functional mobility. Eval:24.28 sec from 18 in surface with decreased control of decent. Current: 16.01 sec with improving descent  - progressing         Key Functional Changes:  Pt presented to therapy with expressed goal of increasing strength and improving gait. 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 Pt reports result of sepsis following tick bite in 2017. Pt has attended 8 sessions with making some progress towards goals.  Continues to ambulate with SPC at times however able to make short distances without AD. Strength is improving however still challenged with 1/2 kneeling and QP. TUG score of 10 sec demonstrates [facility] ambulation without AD however still challenged with 5x sit to stand without UE with taking 16 seconds today with decreased control with descent still. Challenged with floor to stand transfers with some instability initially upon standing. Able to complete 10 bridges with increased height however still challenged with not using lumbar paraspinals. Pt would benefit from continued skilled PT services to address core strength, LE strength, gait and balance. Updated Goals: to be achieved in 4 weeks:   See unmet above    ASSESSMENT/RECOMMENDATIONS:  [x]Continue therapy per initial plan/protocol at a frequency of  2 x per week for 4 weeks  []Continue therapy with the following recommended changes:_____________________      _____________________________________________________________________  []Discontinue therapy progressing towards or have reached established goals  []Discontinue therapy due to lack of appreciable progress towards goals  []Discontinue therapy due to lack of attendance or compliance  []Await Physician's recommendations/decisions regarding therapy  []Other:________________________________________________________________    Thank you for this referral.   Narayan [facility] 2/14/2019 9:52 AM  NOTE TO PHYSICIAN:  PLEASE COMPLETE THE ORDERS BELOW AND   FAX TO [facility]: [phone]  If you are unable to process this request in 24 hours please contact our office: [phone]        []  I have read the above report and request that my patient continue as recommended.   []  I have read the above report and request that my patient continue therapy with the following changes/special instructions:________________________________________  []I have read the above report and request that my patient be discharged from therapy.     [de-identified] Signature:____________Date:_________TIME:________    [facility], Date and Time must be completed for valid certification **

## 2019-02-14 NOTE — PROGRESS NOTES
PT DAILY TREATMENT NOTE     Patient Name: Bk Mota  Date:2019  : 1960  [x]  Patient  Verified  Payor: BLUE CROSS MEDICAID / Plan: [facility] HEALTHKEEPERS PLUS / Product Type: Managed Care Medicaid /    In time:8:35  Out time:9:30  Total Treatment Time (min): 55  Visit #: 8 of 8    Treatment Area: Gait abnormality [R26.9]    SUBJECTIVE  Pain Level (0-10 scale): 5  Any medication changes, allergies to medications, adverse drug reactions, diagnosis change, or new procedure performed?: [x] No    [] Yes (see summary sheet for update)  Subjective functional status/changes:   [] No changes reported  \"It's still hard to stand up without my hands. \"    OBJECTIVE      40 min Therapeutic Exercise:  [x] See flow sheet :   Rationale: increase ROM and increase strength to improve the patients ability to perform daily activities with decreased pain and symptom levels     15 min Neuromuscular Re-education:  [x]  See flow sheet :   Rationale: increase strength, improve coordination, improve balance and increase proprioception  to improve the patients ability to perform daily activities with decreased pain and symptom levels            With   [] TE   [] TA   [] neuro   [] other: Patient Education: [x] Review HEP    [] Progressed/Changed HEP based on:   [] positioning   [] body mechanics   [] transfers   [] heat/ice application    [] other:      Other Objective/Functional Measures:   TUG 10.98sec, 9.90 sec without SPC   See updated goals below      Pain Level (0-10 scale) post treatment: 7    ASSESSMENT/Changes in Function: Pt presented to therapy with expressed goal of increasing strength and improving gait. 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 Pt reports result of sepsis following tick bite in 2017. Pt has attended 8 sessions with making some progress towards goals.  Continues to ambulate with SPC at times however able to make short distances without AD. Strength is improving however still challenged with 1/2 kneeling and QP. TUG score of 10 sec demonstrates [facility] ambulation without AD however still challenged with 5x sit to stand without UE with taking 16 seconds today with decreased control with descent still. Challenged with floor to stand transfers with some instability initially upon standing. Able to complete 10 bridges with increased height however still challenged with not using lumbar paraspinals. Pt would benefit from continued skilled PT services to address core strength, LE strength, gait and balance. Patient will continue to benefit from skilled PT services to modify and progress therapeutic interventions, address functional mobility deficits, address strength deficits, analyze and cue movement patterns, analyze and modify body mechanics/ergonomics, assess and modify postural abnormalities, address imbalance/dizziness and instruct in home and community integration to attain remaining goals. []  See Plan of Care  []  See progress note/recertification  []  See Discharge Summary         Progress towards goals / Updated goals:  Short Term Goals: STG- To be accomplished in 2 week(s):  1.  Pt will be independent with HEP to encourage prophylaxis. Eval:held due to time  Current: compliance per pt report - goal MET     Long Term Goals: LTG- To be accomplished in 4 week(s):  1.  Pt will demonstrate ability to bridge to full height >10 times without use of lumbar paraspinals to indicate functional glut and hamstring strength. .  Eval:increased use of paraspinals   Current: improving height however still using lumbar paraspinals - progressing      2.  Pt will be able to ambulate >10 minutes with LRAD and without pain. .  Eval:tolerance at 5-7 minutes  Current: pt reports tolerance at ~7 min - progressing        3.  Pt will improve 5 time sit<>stand test to 15 sec without use of E to indicate imporved functional mobility.   Eval:24.28 sec from 18 in surface with decreased control of decent.   Current: 16.01 sec with improving descent  - progressing       PLAN   [x]  Upgrade activities as tolerated     [x]  Continue plan of care  []  Update interventions per flow sheet       []  Discharge due to:_  []  Other:_      Faby Lombardiesidavid 2/14/2019  8:33 AM    Future Appointments   Date Time Provider [place]att   2/18/2019  8:30 AM Russell Pimentel, PT, DPT [facility]

## 2019-02-20 ENCOUNTER — APPOINTMENT (OUTPATIENT)
Dept: PHYSICAL THERAPY | Age: 59
End: 2019-02-20
Payer: MEDICAID

## 2019-02-26 ENCOUNTER — HOSPITAL ENCOUNTER (OUTPATIENT)
Dept: PHYSICAL THERAPY | Age: 59
Discharge: HOME | End: 2019-02-26
Payer: MEDICAID

## 2019-02-26 PROCEDURE — 97110 THERAPEUTIC EXERCISES: CPT

## 2019-02-26 PROCEDURE — 97112 NEUROMUSCULAR REEDUCATION: CPT

## 2019-02-26 PROCEDURE — 97530 THERAPEUTIC ACTIVITIES: CPT

## 2019-02-26 NOTE — PROGRESS NOTES
PT DAILY TREATMENT NOTE     Patient Name: Marisa Buckley  Date:2019  : 1960  [x]  Patient  Verified  Payor: BLUE CROSS MEDICAID / Plan: [facility] [employer] HEALTHKEEPERS PLUS / Product Type: Managed Care Medicaid /    In time:3:30 pm  Out time:4:19 pm   Total Treatment Time (min): 49  Visit #: 9 of 16    Treatment Area: Gait abnormality [R26.9]    SUBJECTIVE  Pain Level (0-10 scale): 5  Any medication changes, allergies to medications, adverse drug reactions, diagnosis change, or new procedure performed?: [x] No    [] Yes (see summary sheet for update)  Subjective functional status/changes:   [] No changes reported  \"Some in my finger tips. I have missed you. \"    OBJECTIVE    Modality rationale:    Min Type Additional Details    [] Estim:  []Unatt       []IFC  []Premod                        []Other:  []w/ice   []w/heat  Position:  Location:    [] Estim: []Att    []TENS instruct  []NMES                    []Other:  []w/US   []w/ice   []w/heat  Position:  Location:    []  Traction: [] Cervical       []Lumbar                       [] Prone          []Supine                       []Intermittent   []Continuous Lbs:  [] before manual  [] after manual    []  Ultrasound: []Continuous   [] Pulsed                           []1MHz   []3MHz W/cm2:  Location:    []  Iontophoresis with dexamethasone         Location: [] Take home patch   [] In clinic    []  Ice     []  heat  []  Ice massage  []  Laser   []  Anodyne Position:  Location:    []  Laser with stim  []  Other:  Position:  Location:    []  Vasopneumatic Device Pressure:       [] lo [] med [] hi   Temperature: [] lo [] med [] hi   [] Skin assessment post-treatment:  []intact []redness- no adverse reaction    []redness - adverse reaction:     25 min Therapeutic Exercise:  [x] See flow sheet :   Rationale: increase ROM, increase strength, improve coordination, improve balance and increase proprioception to improve the patients ability to perform daily activities with decreased pain and symptom levels     15 min Therapeutic Activity:  [x]  See flow sheet :   Rationale: increase ROM, increase strength, improve coordination, improve balance and increase proprioception  to improve the patients ability to perform daily activities with decreased pain and symptom levels     9 min Neuromuscular Re-education:  [x]  See flow sheet :   Rationale: increase ROM, increase strength, improve coordination, improve balance and increase proprioception  to improve the patients ability to perform daily activities with decreased pain and symptom levels              With   [] TE   [] TA   [] neuro   [] other: Patient Education: [x] Review HEP    [] Progressed/Changed HEP based on:   [] positioning   [] body mechanics   [] transfers   [] heat/ice application    [] other:      Other Objective/Functional Measures:   HHA with half kneeling activities      Pain Level (0-10 scale) post treatment: 4-5    ASSESSMENT/Changes in Function:   Pt reports getting [employer]. Very challenged with quadruped and half kneeling. Reported more stretch on left hip flexors than right. Patient will continue to benefit from skilled PT services to modify and progress therapeutic interventions, address functional mobility deficits, address ROM deficits, address strength deficits, analyze and address soft tissue restrictions, analyze and cue movement patterns, analyze and modify body mechanics/ergonomics, assess and modify postural abnormalities, address imbalance/dizziness and instruct in home and community integration to attain remaining goals. []  See Plan of Care  []  See progress note/recertification  []  See Discharge Summary         Progress towards goals / Updated goals:  Short Term Goals: STG- To be accomplished in 2 week(s):  1.  Pt will be independent with HEP to encourage prophylaxis.   Eval:held due to time  Last PN: compliance per pt report - goal MET     Long Term Goals: LTG- To be accomplished in 4 week(s):  1.  Pt will demonstrate ability to bridge to full height >10 times without use of lumbar paraspinals to indicate functional glut and hamstring strength. .  Eval:increased use of paraspinals   Last PN: improving height however still using lumbar paraspinals - progressing   Current: progressing, fatigued after this session      2.  Pt will be able to ambulate >10 minutes with LRAD and without pain. .  Eval:tolerance at 5-7 minutes  Last PN: pt reports tolerance at ~7 min - progressing     Current:      3.  Pt will improve 5 time sit<>stand test to 15 sec without use of E to indicate imporved functional mobility. Eval:24.28 sec from 18 in surface with decreased control of decent.   Last PN: 16.01 sec with improving descent  - progressing   Current:     PLAN  [x]  Upgrade activities as tolerated     []  Continue plan of care  []  Update interventions per flow sheet       []  Discharge due to:_  []  Other:_      Martha Ferris, PT, DPT 2/26/2019  3:57 PM    Future Appointments   Date Time Provider [place] Watt   2/28/2019 10:00 AM Nola Fortune, PT, DPT [facility]

## 2019-02-28 ENCOUNTER — APPOINTMENT (OUTPATIENT)
Dept: PHYSICAL THERAPY | Age: 59
End: 2019-02-28
Payer: MEDICAID

## 2019-03-12 ENCOUNTER — APPOINTMENT (OUTPATIENT)
Dept: PHYSICAL THERAPY | Age: 59
End: 2019-03-12
Payer: MEDICAID

## 2019-03-14 ENCOUNTER — HOSPITAL ENCOUNTER (OUTPATIENT)
Dept: PHYSICAL THERAPY | Age: 59
End: 2019-03-14
Payer: MEDICAID

## 2019-03-22 ENCOUNTER — HOSPITAL ENCOUNTER (OUTPATIENT)
Dept: PHYSICAL THERAPY | Age: 59
End: 2019-03-22
Payer: MEDICAID

## 2019-03-26 ENCOUNTER — HOSPITAL ENCOUNTER (OUTPATIENT)
Dept: PHYSICAL THERAPY | Age: 59
Discharge: HOME | End: 2019-03-26
Payer: MEDICAID

## 2019-03-26 PROCEDURE — 97110 THERAPEUTIC EXERCISES: CPT

## 2019-03-26 PROCEDURE — 97530 THERAPEUTIC ACTIVITIES: CPT

## 2019-03-26 NOTE — PROGRESS NOTES
[facility] at the [address], [place], [address] [facility]  Phone: [phone]      Fax:  [phone]    Discharge Summary    Patient name: Marisa Buckley     Start of Care: 1/15/19  Referral source: Young Portillo DO    : 1960  Medical/Treatment Diagnosis: Gait abnormality [R26.9]  Onset Date:  Prior Hospitalization: see medical history   Provider#: [id number]  Comorbidities: hx of sepsis, previous surgery  Prior Level of Function: ambulation with SPC with intermittent use of electric wheelchair  Medications: Verified on Patient Summary List    Visits from Start of Care: 10    Missed Visits: 4  Reporting Period : 1/15/19 to 3/26/19  Short Term Goals: STG- To be accomplished in 2 week(s):  1.  Pt will be independent with HEP to encourage prophylaxis. Eval:held due to time  Last PN: compliance per pt report - goal MET     Long Term Goals: LTG- To be accomplished in 4 week(s):  1.  Pt will demonstrate ability to bridge to full height >10 times without use of lumbar paraspinals to indicate functional glut and hamstring strength. .  Eval:increased use of paraspinals   Last PN: improving height however still using lumbar paraspinals - progressing   Current: able to complete > 10 without back pain - goal MET     2.  Pt will be able to ambulate >10 minutes with LRAD and without pain. .  Eval:tolerance at 5-7 minutes  Last PN: pt reports tolerance at ~7 min - progressing     Current: able to walk > 10 mintues with SPC however pain still  - progressing      3.  Pt will improve 5 time sit<>stand test to 15 sec without use of E to indicate imporved functional mobility. Eval:24.28 sec from 18 in surface with decreased control of decent. Last PN: 16.01 sec with improving descent  - progressing   OHWGVKH: 26.01YGL without UE - goal MET    Assessment/ Summary of Care: Pt presented to therapy with expressed goal of increasing strength and improving gait.  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 Pt reports result of sepsis following tick bite in 2017. Pt has attended 10 sessions making good progress towards goals with improved sit to stands and floor to stand transfers. Overall LE strength is improving as well with decreased use of lumbar paraspinals with bridges. Pt is ready to be discharged at this time due to progress towards goals and per pt request with busy schedule. Updated HEP and DC instructions given today.      RECOMMENDATIONS:  [x]Discontinue therapy: [x]Patient has reached or is progressing toward set goals      []Patient is non-compliant or has abdicated      []Due to lack of appreciable progress towards set goals    Shirlene Keenan Remesic 3/26/2019 8:34 AM

## 2019-03-26 NOTE — PROGRESS NOTES
PT DAILY TREATMENT NOTE    Patient Name: Cecil Aguilar  Date:3/26/2019  : 1960  [x]  Patient  Verified  Payor: BLUE CROSS MEDICAID / Plan: [address] [place] [address] / Product Type: Managed Care Medicaid /    In time:7:45  Out time:8:20  Total Treatment Time (min): 35  Total Timed Codes (min): 35  1:1 Treatment Time ( only): 35   Visit #: 10 of 16    Treatment Area: Gait abnormality [R26.9]    SUBJECTIVE  Pain Level (0-10 scale): 5  Any medication changes, allergies to medications, adverse drug reactions, diagnosis change, or new procedure performed?: [x] No    [] Yes (see summary sheet for update)  Subjective functional status/changes:   [] No changes reported  \"Can I be done soon. \"    OBJECTIVE      20 min Therapeutic Exercise:  [x] See flow sheet :   Rationale: increase ROM and increase strength to improve the patients ability to perform daily activities with decreased pain and symptom levels    15 min Therapeutic Activity:  [x]  See flow sheet :   Rationale: increase strength, improve coordination, improve balance and increase proprioception  to improve the patients ability to perform daily activities with decreased pain and symptom levels     With   [] TE   [] TA   [] neuro   [] other: Patient Education: [x] Review HEP    [] Progressed/Changed HEP based on:   [] positioning   [] body mechanics   [] transfers   [] heat/ice application    [] other:      Other Objective/Functional Measures:   see updated goals below      Pain Level (0-10 scale) post treatment: 5    ASSESSMENT/Changes in Function: Pt presented to therapy with expressed goal of increasing strength and improving gait. 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 Pt reports result of sepsis following tick bite in 2017. Pt has attended 10 sessions making good progress towards goals with improved sit to stands and floor to stand transfers.  Overall LE strength is improving as well with decreased use of lumbar paraspinals with bridges. Pt is ready to be discharged at this time due to progress towards goals and per pt request with busy schedule. Updated HEP and DC instructions given today. Patient will continue to benefit from skilled PT services to modify and progress therapeutic interventions, address functional mobility deficits, address strength deficits, analyze and cue movement patterns, analyze and modify body mechanics/ergonomics, assess and modify postural abnormalities and instruct in home and community integration to attain remaining goals. []  See Plan of Care  []  See progress note/recertification  []  See Discharge Summary         Progress towards goals / Updated goals:  Short Term Goals: STG- To be accomplished in 2 week(s):  1.  Pt will be independent with HEP to encourage prophylaxis. Eval:held due to time  Last PN: compliance per pt report - goal MET     Long Term Goals: LTG- To be accomplished in 4 week(s):  1.  Pt will demonstrate ability to bridge to full height >10 times without use of lumbar paraspinals to indicate functional glut and hamstring strength. .  Eval:increased use of paraspinals   Last PN: improving height however still using lumbar paraspinals - progressing   Current: able to complete > 10 without back pain - goal MET     2.  Pt will be able to ambulate >10 minutes with LRAD and without pain. .  Eval:tolerance at 5-7 minutes  Last PN: pt reports tolerance at ~7 min - progressing     Current: able to walk > 10 mintues with SPC however pain still  - progressing      3.  Pt will improve 5 time sit<>stand test to 15 sec without use of E to indicate imporved functional mobility. Eval:24.28 sec from 18 in surface with decreased control of decent.   Last PN: 16.01 sec with improving descent  - progressing   Current: 13.03sec without UE - goal MET        PLAN  []  Upgrade activities as tolerated     []  Continue plan of care  []  Update interventions per flow sheet       [x]  Discharge due to: pt request, progress towards goals.    []  Other:_      Reno Mae 3/26/2019  7:55 AM    Future Appointments   Date Time Provider [place] Watt   3/28/2019  8:00 AM Reno Mae [facility]

## 2019-03-28 ENCOUNTER — APPOINTMENT (OUTPATIENT)
Dept: PHYSICAL THERAPY | Age: 59
End: 2019-03-28
Payer: MEDICAID

## 2022-06-07 ENCOUNTER — HOSPITAL ENCOUNTER (EMERGENCY)
Age: 62
Discharge: HOME | End: 2022-06-07
Attending: EMERGENCY MEDICINE
Payer: MEDICAID

## 2022-06-07 VITALS
HEIGHT: 70 IN | HEART RATE: 56 BPM | TEMPERATURE: 97.5 F | DIASTOLIC BLOOD PRESSURE: 78 MMHG | OXYGEN SATURATION: 100 % | SYSTOLIC BLOOD PRESSURE: 122 MMHG | WEIGHT: 140 LBS | BODY MASS INDEX: 20.04 KG/M2 | RESPIRATION RATE: 16 BRPM

## 2022-06-07 DIAGNOSIS — Z00.00 NORMAL EXAM: Primary | ICD-10-CM

## 2022-06-07 LAB
ANION GAP SERPL CALC-SCNC: 1 MMOL/L (ref 3–18)
BASOPHILS # BLD: 0 K/UL (ref 0–0.1)
BASOPHILS NFR BLD: 1 % (ref 0–2)
BUN SERPL-MCNC: 14 MG/DL (ref 7–18)
BUN/CREAT SERPL: 24 (ref 12–20)
CALCIUM SERPL-MCNC: 8.8 MG/DL (ref 8.5–10.1)
CHLORIDE SERPL-SCNC: 110 MMOL/L (ref 100–111)
CO2 SERPL-SCNC: 31 MMOL/L (ref 21–32)
CREAT SERPL-MCNC: 0.59 MG/DL (ref 0.6–1.3)
DIFFERENTIAL METHOD BLD: ABNORMAL
EOSINOPHIL # BLD: 0.2 K/UL (ref 0–0.4)
EOSINOPHIL NFR BLD: 4 % (ref 0–5)
ERYTHROCYTE [DISTWIDTH] IN BLOOD BY AUTOMATED COUNT: 13.6 % (ref 11.6–14.5)
GLUCOSE SERPL-MCNC: 92 MG/DL (ref 74–99)
HCT VFR BLD AUTO: 41.3 % (ref 36–48)
HGB BLD-MCNC: 13.4 G/DL (ref 13–16)
IMM GRANULOCYTES # BLD AUTO: 0 K/UL (ref 0–0.04)
IMM GRANULOCYTES NFR BLD AUTO: 0 % (ref 0–0.5)
LYMPHOCYTES # BLD: 0.9 K/UL (ref 0.9–3.6)
LYMPHOCYTES NFR BLD: 21 % (ref 21–52)
MCH RBC QN AUTO: 32.3 PG (ref 24–34)
MCHC RBC AUTO-ENTMCNC: 32.4 G/DL (ref 31–37)
MCV RBC AUTO: 99.5 FL (ref 78–100)
MONOCYTES # BLD: 0.5 K/UL (ref 0.05–1.2)
MONOCYTES NFR BLD: 10 % (ref 3–10)
NEUTS SEG # BLD: 2.9 K/UL (ref 1.8–8)
NEUTS SEG NFR BLD: 64 % (ref 40–73)
NRBC # BLD: 0 K/UL (ref 0–0.01)
NRBC BLD-RTO: 0 PER 100 WBC
PLATELET # BLD AUTO: 292 K/UL (ref 135–420)
PMV BLD AUTO: 9.4 FL (ref 9.2–11.8)
POTASSIUM SERPL-SCNC: 4.2 MMOL/L (ref 3.5–5.5)
RBC # BLD AUTO: 4.15 M/UL (ref 4.35–5.65)
SODIUM SERPL-SCNC: 142 MMOL/L (ref 136–145)
WBC # BLD AUTO: 4.4 K/UL (ref 4.6–13.2)

## 2022-06-07 PROCEDURE — 51702 INSERT TEMP BLADDER CATH: CPT

## 2022-06-07 PROCEDURE — 85025 COMPLETE CBC W/AUTO DIFF WBC: CPT

## 2022-06-07 PROCEDURE — 80048 BASIC METABOLIC PNL TOTAL CA: CPT

## 2022-06-07 PROCEDURE — 99283 EMERGENCY DEPT VISIT LOW MDM: CPT

## 2022-06-07 RX ORDER — SODIUM CHLORIDE 9 MG/ML
1000 INJECTION, SOLUTION INTRAVENOUS ONCE
Status: DISCONTINUED | OUTPATIENT
Start: 2022-06-07 | End: 2022-06-07

## 2022-06-07 NOTE — ED PROVIDER NOTES
21-year-old male with past medical history of anxiety depression enlarged prostate PTSD and limb amputation presents to the emergency department because he says his Servin catheter is not working. Patient reports 2 episodes of going to [place] [address] with a bladder full of 1200 mL and 800 mL of urine. Catheter was placed with relief. Patient was supposed to go to urology but did not go but has an appointment this Thursday. Patient has no fevers no chills no nausea no vomiting no chest pain or shortness of breath. Patient states the urine is running on the side of the Servin catheter. No other issues expressed. Past Medical History:   Diagnosis Date    Anxiety     Depression     Enlarged prostate     PTSD (post-traumatic stress disorder)        Past Surgical History:   Procedure Laterality Date    HX ORTHOPAEDIC      double amputee         History reviewed. No pertinent family history. Social History     Socioeconomic History    Marital status: SINGLE     Spouse name: Not on file    Number of children: Not on file    Years of education: Not on file    Highest education level: Not on file   Occupational History    Not on file   Tobacco Use    Smoking status: Current Every Day Smoker    Smokeless tobacco: Not on file   Substance and Sexual Activity    Alcohol use: Yes    Drug use: Yes     Types: Marijuana, Cocaine     Comment: smokes daily    Sexual activity: Not on file   Other Topics Concern    Not on file   Social History Narrative    Not on file     Social Determinants of Health     Financial Resource Strain:     Difficulty of Paying Living Expenses: Not on file   Food Insecurity:     Worried About Running Out of Food in the Last Year: Not on file    Valdo of Food in the Last Year: Not on file   Transportation Needs:     Lack of Transportation (Medical): Not on file    Lack of Transportation (Non-Medical):  Not on file   Physical Activity:     Days of Exercise per Week: Not on file    Minutes of Exercise per Session: Not on file   Stress:     Feeling of Stress : Not on file   Social Connections:     Frequency of Communication with Friends and Family: Not on file    Frequency of Social Gatherings with Friends and Family: Not on file    Attends [employer] Services: Not on file    Active Member of [address] [place] [address] or Organizations: Not on file    Attends Club or Organization Meetings: Not on file    Marital Status: Not on file   Intimate Partner Violence:     Fear of Current or Ex-Partner: Not on file    Emotionally Abused: Not on file    Physically Abused: Not on file    Sexually Abused: Not on file   Housing Stability:     Unable to Pay for Housing in the Last Year: Not on file    Number of [place] in the Last Year: Not on file    Unstable Housing in the Last Year: Not on file         ALLERGIES: Patient has no known allergies. Review of Systems   Constitutional: Negative. HENT: Negative. Respiratory: Negative. Cardiovascular: Negative. Gastrointestinal: Negative. Genitourinary: Positive for difficulty urinating. Negative for decreased urine volume, dysuria, enuresis, flank pain, frequency, genital sores, hematuria, penile discharge, penile pain, penile swelling, scrotal swelling, testicular pain and urgency. Hematological: Negative. All other systems reviewed and are negative. Vitals:    06/07/22 0813 06/07/22 0817 06/07/22 0824 06/07/22 1017   BP: (!) 127/92 (!) 127/92  122/78   Pulse: 64 72  (!) 56   Resp: 16      Temp: 97.5 °F (36.4 °C)      SpO2: 100% 100% 100% 100%   Weight: 63.5 kg (140 lb)      Height: 5' 10\" (1.778 m)               Physical Exam  Vitals and nursing note reviewed. Constitutional:       Appearance: Normal appearance. HENT:      Head: Normocephalic and atraumatic. Nose: Nose normal.   Eyes:      Extraocular Movements: Extraocular movements intact. Cardiovascular:      Rate and Rhythm: Regular rhythm. Pulses: Normal pulses. Heart sounds: Normal heart sounds. Pulmonary:      Effort: Pulmonary effort is normal.      Breath sounds: Normal breath sounds. Abdominal:      General: There is no distension. Palpations: Abdomen is soft. There is no mass. Tenderness: There is no abdominal tenderness. There is no right CVA tenderness, left CVA tenderness, guarding or rebound. Hernia: No hernia is present. Genitourinary:     Comments: Servin catheter in place genital exam  Musculoskeletal:         General: Normal range of motion. Skin:     General: Skin is warm. Capillary Refill: Capillary refill takes less than 2 seconds. Neurological:      Mental Status: He is alert and oriented to person, place, and time. MDM  Number of Diagnoses or Management Options  Normal exam  Diagnosis management comments: Patient was in the room no distress. Nurse remove the catheter to switch it out. There was a cardiac arrest that came in so nurse left to help. When she came back to the room patient urinated on the floor. He stated he does not want a Servin catheter. And he will follow-up with his urology appointment on Thursday. Unremarkable.        Amount and/or Complexity of Data Reviewed  Clinical lab tests: ordered and reviewed  Tests in the radiology section of CPT®: ordered and reviewed    Risk of Complications, Morbidity, and/or Mortality  Presenting problems: moderate  Diagnostic procedures: moderate  Management options: minimal    Patient Progress  Patient progress: stable         Procedures

## 2022-06-07 NOTE — ED TRIAGE NOTES
Patient reports he is suing [facility] so [facility] He called 911 due his de jesus catheter is leaking. Has urology appointment on Thursday.

## 2022-06-07 NOTE — DISCHARGE INSTRUCTIONS
Follow-up with the urologist as planned on Thursday. Come back if you get worse. Follow-up without fail.

## 2022-07-15 ENCOUNTER — HOSPITAL ENCOUNTER (EMERGENCY)
Age: 62
Discharge: HOME | End: 2022-07-16
Attending: EMERGENCY MEDICINE
Payer: MEDICAID

## 2022-07-15 VITALS
DIASTOLIC BLOOD PRESSURE: 78 MMHG | BODY MASS INDEX: 20.81 KG/M2 | SYSTOLIC BLOOD PRESSURE: 119 MMHG | WEIGHT: 145 LBS | OXYGEN SATURATION: 96 % | TEMPERATURE: 98 F | RESPIRATION RATE: 18 BRPM | HEART RATE: 87 BPM

## 2022-07-15 DIAGNOSIS — N40.0 PROSTATISM: ICD-10-CM

## 2022-07-15 DIAGNOSIS — K62.5 BLOOD PER RECTUM: Primary | ICD-10-CM

## 2022-07-15 DIAGNOSIS — G54.6 PAIN, PHANTOM LIMB (HCC): ICD-10-CM

## 2022-07-15 PROCEDURE — 80053 COMPREHEN METABOLIC PANEL: CPT

## 2022-07-15 PROCEDURE — 96375 TX/PRO/DX INJ NEW DRUG ADDON: CPT

## 2022-07-15 PROCEDURE — 85610 PROTHROMBIN TIME: CPT

## 2022-07-15 PROCEDURE — 96374 THER/PROPH/DIAG INJ IV PUSH: CPT

## 2022-07-15 PROCEDURE — 85730 THROMBOPLASTIN TIME PARTIAL: CPT

## 2022-07-15 PROCEDURE — 99285 EMERGENCY DEPT VISIT HI MDM: CPT

## 2022-07-15 PROCEDURE — 85025 COMPLETE CBC W/AUTO DIFF WBC: CPT

## 2022-07-16 ENCOUNTER — APPOINTMENT (OUTPATIENT)
Dept: CT IMAGING | Age: 62
End: 2022-07-16
Attending: EMERGENCY MEDICINE
Payer: MEDICAID

## 2022-07-16 LAB
ALBUMIN SERPL-MCNC: 3.7 G/DL (ref 3.4–5)
ALBUMIN/GLOB SERPL: 1.1 {RATIO} (ref 0.8–1.7)
ALP SERPL-CCNC: 110 U/L (ref 45–117)
ALT SERPL-CCNC: 28 U/L (ref 16–61)
ANION GAP SERPL CALC-SCNC: 6 MMOL/L (ref 3–18)
APTT PPP: 26.5 SEC (ref 23–36.4)
AST SERPL-CCNC: 25 U/L (ref 10–38)
BASOPHILS # BLD: 0 K/UL (ref 0–0.1)
BASOPHILS NFR BLD: 1 % (ref 0–2)
BILIRUB SERPL-MCNC: 0.9 MG/DL (ref 0.2–1)
BUN SERPL-MCNC: 14 MG/DL (ref 7–18)
BUN/CREAT SERPL: 20 (ref 12–20)
CALCIUM SERPL-MCNC: 8.9 MG/DL (ref 8.5–10.1)
CHLORIDE SERPL-SCNC: 113 MMOL/L (ref 100–111)
CO2 SERPL-SCNC: 24 MMOL/L (ref 21–32)
CREAT SERPL-MCNC: 0.69 MG/DL (ref 0.6–1.3)
DIFFERENTIAL METHOD BLD: ABNORMAL
EOSINOPHIL # BLD: 0.2 K/UL (ref 0–0.4)
EOSINOPHIL NFR BLD: 3 % (ref 0–5)
ERYTHROCYTE [DISTWIDTH] IN BLOOD BY AUTOMATED COUNT: 13.5 % (ref 11.6–14.5)
GLOBULIN SER CALC-MCNC: 3.3 G/DL (ref 2–4)
GLUCOSE SERPL-MCNC: 77 MG/DL (ref 74–99)
HCT VFR BLD AUTO: 40.9 % (ref 36–48)
HGB BLD-MCNC: 14 G/DL (ref 13–16)
IMM GRANULOCYTES # BLD AUTO: 0 K/UL (ref 0–0.04)
IMM GRANULOCYTES NFR BLD AUTO: 0 % (ref 0–0.5)
INR PPP: 1 (ref 0.8–1.2)
LYMPHOCYTES # BLD: 1.4 K/UL (ref 0.9–3.6)
LYMPHOCYTES NFR BLD: 21 % (ref 21–52)
MCH RBC QN AUTO: 32.9 PG (ref 24–34)
MCHC RBC AUTO-ENTMCNC: 34.2 G/DL (ref 31–37)
MCV RBC AUTO: 96.2 FL (ref 78–100)
MONOCYTES # BLD: 0.5 K/UL (ref 0.05–1.2)
MONOCYTES NFR BLD: 8 % (ref 3–10)
NEUTS SEG # BLD: 4.3 K/UL (ref 1.8–8)
NEUTS SEG NFR BLD: 68 % (ref 40–73)
NRBC # BLD: 0 K/UL (ref 0–0.01)
NRBC BLD-RTO: 0 PER 100 WBC
PLATELET # BLD AUTO: 350 K/UL (ref 135–420)
PMV BLD AUTO: 10.1 FL (ref 9.2–11.8)
POTASSIUM SERPL-SCNC: 4 MMOL/L (ref 3.5–5.5)
PROT SERPL-MCNC: 7 G/DL (ref 6.4–8.2)
PROTHROMBIN TIME: 13.6 SEC (ref 11.5–15.2)
RBC # BLD AUTO: 4.25 M/UL (ref 4.35–5.65)
SODIUM SERPL-SCNC: 143 MMOL/L (ref 136–145)
WBC # BLD AUTO: 6.4 K/UL (ref 4.6–13.2)

## 2022-07-16 PROCEDURE — 74011000636 HC RX REV CODE- 636: Performed by: EMERGENCY MEDICINE

## 2022-07-16 PROCEDURE — 74011250636 HC RX REV CODE- 250/636: Performed by: EMERGENCY MEDICINE

## 2022-07-16 PROCEDURE — C9113 INJ PANTOPRAZOLE SODIUM, VIA: HCPCS | Performed by: EMERGENCY MEDICINE

## 2022-07-16 PROCEDURE — 74177 CT ABD & PELVIS W/CONTRAST: CPT

## 2022-07-16 RX ORDER — TAMSULOSIN HYDROCHLORIDE 0.4 MG/1
0.4 CAPSULE ORAL DAILY
Qty: 15 CAPSULE | Refills: 0 | Status: SHIPPED | OUTPATIENT
Start: 2022-07-16 | End: 2022-07-31

## 2022-07-16 RX ORDER — MORPHINE SULFATE 4 MG/ML
4 INJECTION INTRAVENOUS
Status: COMPLETED | OUTPATIENT
Start: 2022-07-16 | End: 2022-07-16

## 2022-07-16 RX ORDER — SODIUM CHLORIDE 9 MG/ML
125 INJECTION, SOLUTION INTRAVENOUS CONTINUOUS
Status: DISCONTINUED | OUTPATIENT
Start: 2022-07-16 | End: 2022-07-16 | Stop reason: HOSPADM

## 2022-07-16 RX ORDER — TRAMADOL HYDROCHLORIDE 50 MG/1
50 TABLET ORAL
Qty: 18 TABLET | Refills: 0 | Status: SHIPPED | OUTPATIENT
Start: 2022-07-16 | End: 2022-07-19

## 2022-07-16 RX ORDER — GABAPENTIN 300 MG/1
300 CAPSULE ORAL 3 TIMES DAILY
Qty: 60 CAPSULE | Refills: 0 | Status: SHIPPED | OUTPATIENT
Start: 2022-07-16 | End: 2022-09-14

## 2022-07-16 RX ORDER — PANTOPRAZOLE SODIUM 40 MG/10ML
80 INJECTION, POWDER, LYOPHILIZED, FOR SOLUTION INTRAVENOUS
Status: COMPLETED | OUTPATIENT
Start: 2022-07-16 | End: 2022-07-16

## 2022-07-16 RX ORDER — ONDANSETRON 2 MG/ML
4 INJECTION INTRAMUSCULAR; INTRAVENOUS
Status: COMPLETED | OUTPATIENT
Start: 2022-07-16 | End: 2022-07-16

## 2022-07-16 RX ADMIN — IOPAMIDOL 100 ML: 612 INJECTION, SOLUTION INTRAVENOUS at 02:42

## 2022-07-16 RX ADMIN — MORPHINE SULFATE 4 MG: 4 INJECTION INTRAVENOUS at 00:48

## 2022-07-16 RX ADMIN — PANTOPRAZOLE SODIUM 80 MG: 40 INJECTION, POWDER, FOR SOLUTION INTRAVENOUS at 00:49

## 2022-07-16 RX ADMIN — ONDANSETRON 4 MG: 2 INJECTION, SOLUTION INTRAMUSCULAR; INTRAVENOUS at 00:48

## 2022-07-16 RX ADMIN — SODIUM CHLORIDE 125 ML/HR: 9 INJECTION, SOLUTION INTRAVENOUS at 00:48

## 2022-07-16 NOTE — ED TRIAGE NOTES
Pt arrives to ed reporting rectal bleeding for three days ago. Pt states he notices the blood when he wipes . Pt also reports phantom limb pain. Pt does have bilateral aka.

## 2022-07-16 NOTE — ED PROVIDER NOTES
EMERGENCY DEPARTMENT HISTORY AND PHYSICAL EXAM    Date: 7/15/2022  Patient Name: Autumn Opitz    History of Presenting Illness     Chief Complaint   Patient presents with    Rectal Bleeding         History Provided By: Patient    Additional History (Context):   7:04 AM  Brayden Segundo is a 57 y.o. male with PMHX of bilateral lower extremity amputation, regular tobacco use, prostatic enlargement, depression anxiety who presents to the emergency department C/O phantom leg pain and stump pain; he also complains of blood per rectum. Patient was brought in by ambulance complaints of pain to his lower extremities and his stumps. He states he is got phantom pain associated with this takes long-term medications including gabapentin and Percocet or tramadol. He also acknowledges chronic ER visits for urinary problems retention with chronic indwelling Servin catheter. He states he has medication prescriptions for his high blood pressure, Norvasc as well as Lipitor for his high cholesterol. Arlene Money He also has recurrent abdominal pain which seems to come and go depending on daily activities but he eats and drinks. He cannot really characterize it or give the location. There is no steady increasing or decreasing factors with this. He states when cleaning his rectum he has had blood which \"scares me because cancer runs in my family\". He is very lean and denies any knowledge of any weight loss or night sweats, although he acknowledges \"I use\"    Social History  He acknowledges frequent marijuana for his phantom pain in addition to tobacco use. He also has frequent history of cocaine use    Family History  Cancer runs in the family.       PCP: Ladonna Cartwright MD    Current Facility-Administered Medications   Medication Dose Route Frequency Provider Last Rate Last Admin    0.9% sodium chloride infusion  125 mL/hr IntraVENous CONTINUOUS Chanel Hogue MD   IV Completed at 07/16/22 0227     Current Outpatient Medications   Medication Sig Dispense Refill    gabapentin (NEURONTIN) 300 mg capsule Take 1 Capsule by mouth three (3) times daily. Max Daily Amount: 900 mg. 60 Capsule 0    traMADoL (Ultram) 50 mg tablet Take 1 Tablet by mouth every four (4) hours as needed for Pain for up to 3 days. Max Daily Amount: 300 mg. 18 Tablet 0    tamsulosin (Flomax) 0.4 mg capsule Take 1 Capsule by mouth daily for 15 days. 15 Capsule 0    oxyCODONE-acetaminophen (PERCOCET) 5-325 mg per tablet Take 1 Tab by mouth every six (6) hours as needed for Pain for up to 12 doses. Max Daily Amount: 4 Tabs. 12 Tab 0       Past History     Past Medical History:  Past Medical History:   Diagnosis Date    Anxiety     Depression     Enlarged prostate     PTSD (post-traumatic stress disorder)        Past Surgical History:  Past Surgical History:   Procedure Laterality Date    HX ORTHOPAEDIC      double amputee       Family History:  No family history on file. Social History:  Social History     Tobacco Use    Smoking status: Current Every Day Smoker    Smokeless tobacco: Not on file   Substance Use Topics    Alcohol use: Yes    Drug use: Yes     Types: Marijuana, Cocaine     Comment: smokes daily       Allergies:  No Known Allergies      Review of Systems   Review of Systems   Constitutional: Negative. Negative for unexpected weight change. Gastrointestinal: Positive for blood in stool. Skin: Positive for wound (Surgical wounds to bilateral amputations looks to be good. ). Neurological:        Nerve pain     Physical Exam     Vitals:    07/15/22 2253 07/15/22 2254 07/15/22 2254 07/15/22 2255   BP:    119/78   Pulse: 87      Resp: 18      Temp:  98 °F (36.7 °C)     SpO2: 96%      Weight:   65.8 kg (145 lb)      Physical Exam  Vitals and nursing note reviewed. Constitutional:       General: He is not in acute distress. Appearance: He is well-developed. He is not diaphoretic. HENT:      Head: Normocephalic and atraumatic. Eyes:      General: No scleral icterus. Extraocular Movements:      Right eye: Normal extraocular motion. Left eye: Normal extraocular motion. Conjunctiva/sclera: Conjunctivae normal.      Pupils: Pupils are equal, round, and reactive to light. Neck:      Trachea: No tracheal deviation. Cardiovascular:      Rate and Rhythm: Normal rate and regular rhythm. Heart sounds: Normal heart sounds. Pulmonary:      Effort: Pulmonary effort is normal. No respiratory distress. Breath sounds: Normal breath sounds. No stridor. Abdominal:      General: Bowel sounds are normal. There is no distension. Palpations: Abdomen is soft. Tenderness: There is no abdominal tenderness. There is no rebound. Genitourinary:         Comments: Is a darkly pigmented -American. He has a irregular bordered region of hypopigmentation scattered between 2 and 3 cm in irregular pattern under the periphery of his rectum. There is no evidence of skin elevation palpable border erythema or induration. .  Musculoskeletal:         General: No tenderness. Normal range of motion. Cervical back: Normal range of motion and neck supple. Comments: Bilateral lower extremity amputation. Stump site well-healed closed surgical incisions. There is no redness swelling  Grossly unremarkable without abnormalities   Skin:     General: Skin is warm and dry. Capillary Refill: Capillary refill takes less than 2 seconds. Findings: No erythema or rash. Comments: Darkly pigmented -American. He has an irregular shaped but non erythematous and nonraised border and afebrile like pattern to the periphery of his rectum. Neurological:      Mental Status: He is alert and oriented to person, place, and time. GCS: GCS eye subscore is 4. GCS verbal subscore is 5. GCS motor subscore is 6. Cranial Nerves: No cranial nerve deficit. Motor: No weakness.    Psychiatric:         Mood and Affect: Mood normal.         Behavior: Behavior normal.         Thought Content: Thought content normal.         Judgment: Judgment normal.       Diagnostic Study Results     Labs -  Recent Results (from the past 24 hour(s))   CBC WITH AUTOMATED DIFF    Collection Time: 07/15/22 11:00 PM   Result Value Ref Range    WBC 6.4 4.6 - 13.2 K/uL    RBC 4.25 (L) 4.35 - 5.65 M/uL    HGB 14.0 13.0 - 16.0 g/dL    HCT 40.9 36.0 - 48.0 %    MCV 96.2 78.0 - 100.0 FL    MCH 32.9 24.0 - 34.0 PG    MCHC 34.2 31.0 - 37.0 g/dL    RDW 13.5 11.6 - 14.5 %    PLATELET 410 890 - 656 K/uL    MPV 10.1 9.2 - 11.8 FL    NRBC 0.0 0  WBC    ABSOLUTE NRBC 0.00 0.00 - 0.01 K/uL    NEUTROPHILS 68 40 - 73 %    LYMPHOCYTES 21 21 - 52 %    MONOCYTES 8 3 - 10 %    EOSINOPHILS 3 0 - 5 %    BASOPHILS 1 0 - 2 %    IMMATURE GRANULOCYTES 0 0.0 - 0.5 %    ABS. NEUTROPHILS 4.3 1.8 - 8.0 K/UL    ABS. LYMPHOCYTES 1.4 0.9 - 3.6 K/UL    ABS. MONOCYTES 0.5 0.05 - 1.2 K/UL    ABS. EOSINOPHILS 0.2 0.0 - 0.4 K/UL    ABS. BASOPHILS 0.0 0.0 - 0.1 K/UL    ABS. IMM. GRANS. 0.0 0.00 - 0.04 K/UL    DF AUTOMATED     METABOLIC PANEL, COMPREHENSIVE    Collection Time: 07/15/22 11:00 PM   Result Value Ref Range    Sodium 143 136 - 145 mmol/L    Potassium 4.0 3.5 - 5.5 mmol/L    Chloride 113 (H) 100 - 111 mmol/L    CO2 24 21 - 32 mmol/L    Anion gap 6 3.0 - 18 mmol/L    Glucose 77 74 - 99 mg/dL    BUN 14 7.0 - 18 MG/DL    Creatinine 0.69 0.6 - 1.3 MG/DL    BUN/Creatinine ratio 20 12 - 20      GFR est AA >60 >60 ml/min/1.73m2    GFR est non-AA >60 >60 ml/min/1.73m2    Calcium 8.9 8.5 - 10.1 MG/DL    Bilirubin, total 0.9 0.2 - 1.0 MG/DL    ALT (SGPT) 28 16 - 61 U/L    AST (SGOT) 25 10 - 38 U/L    Alk.  phosphatase 110 45 - 117 U/L    Protein, total 7.0 6.4 - 8.2 g/dL    Albumin 3.7 3.4 - 5.0 g/dL    Globulin 3.3 2.0 - 4.0 g/dL    A-G Ratio 1.1 0.8 - 1.7     PROTHROMBIN TIME + INR    Collection Time: 07/15/22 11:00 PM   Result Value Ref Range    Prothrombin time 13.6 11.5 - 15.2 sec    INR 1.0 0.8 - 1.2     PTT    Collection Time: 07/15/22 11:00 PM   Result Value Ref Range    aPTT 26.5 23.0 - 36.4 SEC        Radiologic Studies -   CT ABD PELV W CONT   Final Result      No acute intra-abdominal process. Prostate gland hypertrophy with urinary bladder wall thickening likely chronic. CT Results  (Last 48 hours)               07/16/22 0242  CT ABD PELV W CONT Final result    Impression:      No acute intra-abdominal process. Prostate gland hypertrophy with urinary bladder wall thickening likely chronic. Narrative:  EXAM: CT of the abdomen and pelvis       INDICATION: Pain. COMPARISON: None. TECHNIQUE: Axial CT imaging of the abdomen and pelvis was performed with   intravenous contrast. Multiplanar reformats were generated. Dose reduction   techniques used: automated exposure control, adjustment of the mAs and/or kVp   according to patient size, and iterative reconstruction techniques. Digital   imaging and communications in Medicine (DICOM) format image data are available   to nonaffiliated external healthcare facilities or entities on a secure, media   free, reciprocally searchable basis with patient authorization for at least 12   months after this study. _______________       FINDINGS:       LOWER CHEST: There is scarring at the anterior lung bases. LIVER, BILIARY: There are scattered hepatic hypodensities measuring up to 2.4 cm   left lobe of the liver likely representing cysts, some of which are are too   small to characterize but likely cysts. No biliary dilation. Gallbladder is   unremarkable. PANCREAS: Normal.       SPLEEN: Normal.       ADRENALS: Normal.       KIDNEYS: The kidneys are normal in appearance. There is mild urinary bladder   wall thickening. LYMPH NODES: No enlarged lymph nodes. GASTROINTESTINAL TRACT: No bowel dilation or wall thickening. PELVIC ORGANS: Unremarkable.        VASCULATURE: There is prostate gland hypertrophy. BONES: No acute or aggressive osseous abnormalities identified. OTHER: None.       _______________               CXR Results  (Last 48 hours)    None          Medications given in the ED-  Medications   0.9% sodium chloride infusion (0 mL/hr IntraVENous IV Completed 7/16/22 0747)   morphine injection 4 mg (4 mg IntraVENous Given 7/16/22 0048)   ondansetron (ZOFRAN) injection 4 mg (4 mg IntraVENous Given 7/16/22 0048)   pantoprazole (PROTONIX) injection 80 mg (80 mg IntraVENous Given 7/16/22 0049)   iopamidoL (ISOVUE 300) 61 % contrast injection 100 mL (100 mL IntraVENous Given 7/16/22 0242)         Medical Decision Making   I am the first provider for this patient. I reviewed the vital signs, available nursing notes, past medical history, past surgical history, family history and social history. Vital Signs-Reviewed the patient's vital signs. Pulse Oximetry Analysis - 96% on room air    Cardiac Monitor:  Rate: 88 bpm  Rhythm: Normal sinus rhythm        Records Reviewed: NURSING NOTES AND PREVIOUS MEDICAL RECORDS    Provider Notes (Medical Decision Making):   Patient with dental pain likely just chronic pain issues. Wound showed no evidence of infection breakdown although he states today regular headache and needs to be readjusted. No evidence of cellulitis. Abdomen exam is unremarkable. Chronic inflammation or changes to the periphery noted on examination to explain the bleeding. Scanning shows no evidence of cancer or malignancy or tumor. By examination there is no external or internal hemorrhoidal flares but he does have trace blood on my examination and occult blood will variably be positive. He was given upper GI meds but doubt this represents gastritis although his lifestyle is a set up for this. No cancer was found no signs of active bleeding was found.   During discharge he stated that his regular doctors usually gives him \"Perc 10's\" although I felt it was more than appropriate in an ER setting to provide him gabapentin and tramadol until this can be negotiated with his primary care managers. Procedures:  Procedures    ED Course:   7:04 AM: Initial assessment performed. The patients presenting problems have been discussed, and they are in agreement with the care plan formulated and outlined with them. I have encouraged them to ask questions as they arise throughout their visit. Diagnosis and Disposition       DISCHARGE NOTE:  7:03 AM  Brayden Hays's  results have been reviewed with him. He has been counseled regarding his diagnosis, treatment, and plan. He verbally conveys understanding and agreement of the signs, symptoms, diagnosis, treatment and prognosis and additionally agrees to follow up as discussed. He also agrees with the care-plan and conveys that all of his questions have been answered. I have also provided discharge instructions for him that include: educational information regarding their diagnosis and treatment, and list of reasons why they would want to return to the ED prior to their follow-up appointment, should his condition change. He has been provided with education for proper emergency department utilization. CLINICAL IMPRESSION:    1. Blood per rectum    2. Prostatism    3. Pain, phantom limb ([address]        PLAN:  1. D/C Home  2. Discharge Medication List as of 7/16/2022  7:35 AM      START taking these medications    Details   gabapentin (NEURONTIN) 300 mg capsule Take 1 Capsule by mouth three (3) times daily. Max Daily Amount: 900 mg., Normal, Disp-60 Capsule, R-0      traMADoL (Ultram) 50 mg tablet Take 1 Tablet by mouth every four (4) hours as needed for Pain for up to 3 days. Max Daily Amount: 300 mg., Normal, Disp-18 Tablet, R-0      tamsulosin (Flomax) 0.4 mg capsule Take 1 Capsule by mouth daily for 15 days. , Normal, Disp-15 Capsule, R-0         CONTINUE these medications which have NOT CHANGED    Details oxyCODONE-acetaminophen (PERCOCET) 5-325 mg per tablet Take 1 Tab by mouth every six (6) hours as needed for Pain for up to 12 doses. Max Daily Amount: 4 Tabs., Print, Disp-12 Tab, R-0           3. Follow-up Information     Follow up With Specialties Details Why [facility]  Schedule an appointment as soon as possible for a visit   [address]  [address]    Kelley Jeong MD Family Medicine In 1 week  [address]  [phone]          _______________________________    This note was partially transcribed via voice recognition software. Although efforts have been made to catch any discrepancies, it may contain sound alike words, grammatical errors, or nonsensical words.

## 2022-07-20 ENCOUNTER — HOSPITAL ENCOUNTER (EMERGENCY)
Age: 62
Discharge: HOME | End: 2022-07-20
Attending: EMERGENCY MEDICINE
Payer: MEDICAID

## 2022-07-20 VITALS
RESPIRATION RATE: 18 BRPM | SYSTOLIC BLOOD PRESSURE: 116 MMHG | HEART RATE: 73 BPM | HEIGHT: 70 IN | BODY MASS INDEX: 20.76 KG/M2 | DIASTOLIC BLOOD PRESSURE: 88 MMHG | WEIGHT: 145 LBS | OXYGEN SATURATION: 99 % | TEMPERATURE: 97.6 F

## 2022-07-20 DIAGNOSIS — T87.89 AMPUTATION STUMP PAIN (HCC): Primary | ICD-10-CM

## 2022-07-20 DIAGNOSIS — M79.609 AMPUTATION STUMP PAIN (HCC): Primary | ICD-10-CM

## 2022-07-20 PROCEDURE — 99283 EMERGENCY DEPT VISIT LOW MDM: CPT

## 2022-07-20 PROCEDURE — 74011250637 HC RX REV CODE- 250/637: Performed by: EMERGENCY MEDICINE

## 2022-07-20 RX ORDER — TRAMADOL HYDROCHLORIDE 50 MG/1
100 TABLET ORAL
Status: COMPLETED | OUTPATIENT
Start: 2022-07-20 | End: 2022-07-20

## 2022-07-20 RX ADMIN — TRAMADOL HYDROCHLORIDE 100 MG: 50 TABLET, COATED ORAL at 05:57

## 2022-07-20 NOTE — ED TRIAGE NOTES
Patient to ED via EMS for complaints of bilateral BKA pain. Patient states his stumps have become inflamed to the point where he can no longer walk. Patient also states he was at [facility] recently for rectal bleeding.  Patient also wants his bladder checked because he has prostate issues but reports that he is urinating appropriately

## 2022-07-20 NOTE — ED NOTES
Pt arrived with c/o swelling and inflammation to the right amputation site. Pt states it has been going on for a few months but is worse tonight. Pt states \"the pain is so bad I can't put on my prosthetics. \". pt is alert and oriented x4. Vital signs are stable.

## 2022-07-20 NOTE — DISCHARGE INSTRUCTIONS
You were given prescriptions for pain medications on your previous visit fill these prescriptions since we cannot give you new prescriptions. You need to follow-up with the prosthetic group for the stump pain.

## 2022-07-27 ENCOUNTER — HOSPITAL ENCOUNTER (EMERGENCY)
Age: 62
Discharge: HOME | End: 2022-07-27
Attending: STUDENT IN AN ORGANIZED HEALTH CARE EDUCATION/TRAINING PROGRAM
Payer: MEDICAID

## 2022-07-27 VITALS
TEMPERATURE: 97.8 F | DIASTOLIC BLOOD PRESSURE: 67 MMHG | BODY MASS INDEX: 21.47 KG/M2 | HEIGHT: 70 IN | OXYGEN SATURATION: 100 % | WEIGHT: 150 LBS | SYSTOLIC BLOOD PRESSURE: 99 MMHG | HEART RATE: 83 BPM | RESPIRATION RATE: 16 BRPM

## 2022-07-27 DIAGNOSIS — G89.29 CHRONIC PAIN OF BOTH LOWER EXTREMITIES: Primary | ICD-10-CM

## 2022-07-27 DIAGNOSIS — M79.604 CHRONIC PAIN OF BOTH LOWER EXTREMITIES: Primary | ICD-10-CM

## 2022-07-27 DIAGNOSIS — M79.605 CHRONIC PAIN OF BOTH LOWER EXTREMITIES: Primary | ICD-10-CM

## 2022-07-27 PROCEDURE — 99283 EMERGENCY DEPT VISIT LOW MDM: CPT

## 2022-07-27 PROCEDURE — 99281 EMR DPT VST MAYX REQ PHY/QHP: CPT

## 2022-07-27 PROCEDURE — 74011250637 HC RX REV CODE- 250/637: Performed by: STUDENT IN AN ORGANIZED HEALTH CARE EDUCATION/TRAINING PROGRAM

## 2022-07-27 RX ORDER — OXYCODONE AND ACETAMINOPHEN 5; 325 MG/1; MG/1
1 TABLET ORAL
Status: COMPLETED | OUTPATIENT
Start: 2022-07-27 | End: 2022-07-27

## 2022-07-27 RX ADMIN — OXYCODONE AND ACETAMINOPHEN 1 TABLET: 5; 325 TABLET ORAL at 02:58

## 2022-07-27 NOTE — ED NOTES
Pt states that pain in both legs 15/10 and its been a couple of days. Pt states on last emergency room visit he was suppose to follow up with a primary care physician but has not been able to walk.

## 2022-07-27 NOTE — ED TRIAGE NOTES
Pt c/o of bilateral stump pain. Pt has a bilateral bka. Pt c/o stumps being irritated and inflamed. Pt c/o rectal; bleeding that's on and off with his BM c.o bright red blood.  Vss nad noted

## 2022-07-27 NOTE — DISCHARGE INSTRUCTIONS
Please carefully read all discharge instructions    Please follow-up with a primary care physician and if you do not have one currently use the contact information provided to obtain an appointment. If none was provided please call the number on the back of your insurance card to locate a Primary care doctor. Many offices have \"cancellation lists\" that you can ask to be placed on; should a patient with an earlier appointment cancel you will be notified to take their place. Please return to the Emergency Room immediately if your symptoms worsen. Please return to the Emergency Department if you develop a fever, chills, cannot eat or drink due to nausea or vomiting, or if any of your symptoms worsen. If you do not have insurance you can use the below for your medications. Inhal[email or web address]    What are GoodRx coupons? GoodRx coupons will help you pay less than the cash price for your prescription. Maureen Herrlich free to use and are accepted at virtually every [place] pharmacy. Your pharmacist will know how to enter the codes on the coupon to pull up the lowest discount available. [employer] Activation    Thank you for requesting access to [employer]. Please follow the instructions below to securely access and download your online medical record. [employer] allows you to send messages to your doctor, view your test results, renew your prescriptions, schedule appointments, and more. How Do I Sign Up? In your internet browser, go to [email or web address]  Click on the First Time User? Click Here link in the Sign In box. You will be redirect to the New Member Sign Up page. Enter your [employer] Access Code exactly as it appears below. You will not need to use this code after youve completed the sign-up process. If you do not sign up before the expiration date, you must request a new code.     [employer] Access Code: [email or web address]  Expires: 9/10/2022  2:17 AM    Enter the last four digits of your Social Security Number (xxxx) and Date of Birth (mm/dd/yyyy) as indicated and click Submit. You will be taken to the next sign-up page. Create a [employer] ID. This will be your [employer] login ID and cannot be changed, so think of one that is secure and easy to remember. Create a [employer] password. You can change your password at any time. Enter your Password Reset Question and Answer. This can be used at a later time if you forget your password. Enter your e-mail address. You will receive e-mail notification when new information is available in [address] Click Sign Up. You can now view and download portions of your medical record. Click the Spoqa link to download a portable copy of your medical information. Additional Information    If you have questions, please visit the Frequently Asked Questions section of the [employer] website at [email or web address] Remember, [employer] is NOT to be used for urgent needs. For medical emergencies, dial 911.

## 2022-07-27 NOTE — ED PROVIDER NOTES
EMERGENCY DEPARTMENT HISTORY AND PHYSICAL EXAM      Date: 7/27/2022  Patient Name: Himanshu Murphy    History of Presenting Illness     Chief Complaint   Patient presents with    Leg Pain     HPI:  Himanshu Murphy is a 57 y.o. male with a history of bilateral BKA who presents with complaints of \"stump pain \"    He states that his stumps are being irritated and inflamed by his prosthetics. He states he has phantom pain associated with this takes long-term medications including gabapentin and Percocet or tramadol    He has recurrent bright red blood per rectum, although none currently. He states that his pain is chronic, most recently, it was noted in the emergency department, that he told a provider that he was going to get new prosthetics, and was to follow-up in the next few days, and only requested a short course of pain medications, however he has not been able to do so, or failed to do so. On review of systems, the patient denies trauma, fever, chills, IV drug use, fecal incontinence, urinary retention or incontinence, numbness, weakness, tingling, frequent falls. He has a recent CT scan with no acute abdominal pathology. PCP: None    Current Outpatient Medications   Medication Sig Dispense Refill    gabapentin (NEURONTIN) 300 mg capsule Take 1 Capsule by mouth three (3) times daily. Max Daily Amount: 900 mg. 60 Capsule 0    oxyCODONE-acetaminophen (PERCOCET) 5-325 mg per tablet Take 1 Tab by mouth every six (6) hours as needed for Pain for up to 12 doses. Max Daily Amount: 4 Tabs. 12 Tab 0       Past History     Past Medical History:  Past Medical History:   Diagnosis Date    Anxiety     Depression     Enlarged prostate     PTSD (post-traumatic stress disorder)        Past Surgical History:  Past Surgical History:   Procedure Laterality Date    HX ORTHOPAEDIC      double amputee       Family History:  No family history on file.     Social History:  Social History     Tobacco Use Smoking status: Every Day   Substance Use Topics    Alcohol use: Yes    Drug use: Yes     Types: Marijuana, Cocaine     Comment: smokes daily       Allergies:  No Known Allergies    PMH, PSH, family history, social history, allergies reviewed with the patient with significant items noted above. Review of Systems   As per HPI, otherwise reviewed and negative. Physical Exam     Vitals:    07/27/22 0213 07/27/22 0215   BP: 99/67    Pulse: 83    Resp: 16    Temp: 97.8 °F (36.6 °C)    SpO2: 100%    Weight:  68 kg (150 lb)   Height:  5' 10\" (1.778 m)       Gen: Well-appearing, in no acute distress   On my initial exam, patient is sleeping. HEENT: Normocephalic, sclera anicteric  Cardiovascular: Normal rate, regular rhythm, no murmurs, rubs, gallops. Pulses intact and equal distally. Pulmonary: No respiratory distress. No stridor. Clear lungs. ABD: Soft, nontender, nondistended. Neuro: Full strength and sensation throughout the bilateral lower extremities. Alert. Normal speech. Normal mentation. Psych: Normal thought content and thought processes. : No CVA tenderness. EXT: Normal bulk and tone. Moves all extremities well. Skin: Warm and well-perfused. No obvious signs of infection or significant irritation on either stop, the skin is quite loose, may be causing the irritation, and pain. Diagnostic Study Results     Labs -   No results found for this or any previous visit (from the past 12 hour(s)). Radiologic Studies -   No orders to display     CT Results  (Last 48 hours)      None          CXR Results  (Last 48 hours)      None              Medical Decision Making   I am the first provider for this patient. I reviewed the vital signs, available nursing notes, past medical history, past surgical history, family history and social history. Vital Signs-Reviewed the patient's vital signs.        Records Reviewed: Personally, on initial evaluation    MDM:   Patient presents with chronic pain. Exam significant for no significant new abnormalities, suspect this is chronic phantom pain. .    Patient condition on initial evaluation: Chronically ill-appearing no acute distress    Discussed with patient, no new symptoms suggesting occult bacterial infection, doubt metabolic disarray, suspect this is acute on chronic pain. I see no malfunction or misshapen mold in his prosthetics. Stressed the importance that he will need to follow-up with his prosthetic provider. Plan:   Pain Control  Close Observation  As per orders noted below:  Orders Placed This Encounter    oxyCODONE-acetaminophen (PERCOCET) 5-325 mg per tablet 1 Tablet      Per patient he has medications not filled. We will give him single dose of oxycodone, so that he can rest today. Discussed with patient and he is comfortable with this plan. Counseled patient about the need for pain management, asked him for referral.  Stressed the importance of maintaining continuity at 1 ER, he is certainly welcome here, however when he goes to multiple residence complaining of pain this is a big red flag, I do not want this patient to be labeled as a drug seeker. ED Course:   ED Course as of 08/01/22 1810 Wed Jul 27, 2022   0255 No acute pathology necessitating further emergent workup or hospital admission is suspected or found. Will discharge home with follow up, needs to see his orthotist. He is comfortable with the plan and discharge at this time. Expressed the importance of follow up for current symptoms and he agrees and was advised on what signs/symptoms to return immediately to the ER. [DM]      ED Course User Index  [DM] Matt Nicolas MD            Vitals Review/addressed -     Diagnostic Study Results     Orders Placed This Encounter    oxyCODONE-acetaminophen (PERCOCET) 5-325 mg per tablet 1 Tablet       Labs -   No results found for this or any previous visit (from the past 12 hour(s)).     Radiologic Studies -   No orders to display     CT Results  (Last 48 hours)      None          CXR Results  (Last 48 hours)      None            Disposition     Disposition:  Home    CLINICAL IMPRESSION:    1. Chronic pain of both lower extremities        It should be noted that I will be the provider of record for this patient  Erika Flowers MD    Follow-up Information       Follow up With Specialties Details Why [address]    [facility] EMERGENCY DEPT Emergency Medicine Go to  If symptoms worsen [address] [place]  [phone]    Jayshree Mckeon MD Family Medicine Call in 1 day  [address] [phone]              Discharge Medication List as of 7/27/2022  3:25 AM          Please note that this dictation was completed with Cantimer, the computer voice recognition software. Quite often unanticipated grammatical, syntax, homophones, and other interpretive errors are inadvertently transcribed by the computer software. Please disregard these errors. Please excuse any errors that have escaped final proofreading.

## 2022-08-11 ENCOUNTER — HOSPITAL ENCOUNTER (EMERGENCY)
Age: 62
Discharge: HOME | End: 2022-08-11
Attending: STUDENT IN AN ORGANIZED HEALTH CARE EDUCATION/TRAINING PROGRAM
Payer: MEDICAID

## 2022-08-11 VITALS
TEMPERATURE: 97.6 F | SYSTOLIC BLOOD PRESSURE: 108 MMHG | BODY MASS INDEX: 20.04 KG/M2 | RESPIRATION RATE: 18 BRPM | WEIGHT: 140 LBS | DIASTOLIC BLOOD PRESSURE: 70 MMHG | OXYGEN SATURATION: 97 % | HEIGHT: 70 IN | HEART RATE: 70 BPM

## 2022-08-11 DIAGNOSIS — M79.604 CHRONIC PAIN OF BOTH LOWER EXTREMITIES: Primary | ICD-10-CM

## 2022-08-11 DIAGNOSIS — G89.29 CHRONIC PAIN OF BOTH LOWER EXTREMITIES: Primary | ICD-10-CM

## 2022-08-11 DIAGNOSIS — Z89.511 S/P BILATERAL BKA (BELOW KNEE AMPUTATION) (HCC): ICD-10-CM

## 2022-08-11 DIAGNOSIS — M79.605 CHRONIC PAIN OF BOTH LOWER EXTREMITIES: Primary | ICD-10-CM

## 2022-08-11 DIAGNOSIS — Z89.512 S/P BILATERAL BKA (BELOW KNEE AMPUTATION) (HCC): ICD-10-CM

## 2022-08-11 PROCEDURE — 74011250636 HC RX REV CODE- 250/636: Performed by: STUDENT IN AN ORGANIZED HEALTH CARE EDUCATION/TRAINING PROGRAM

## 2022-08-11 PROCEDURE — 96372 THER/PROPH/DIAG INJ SC/IM: CPT

## 2022-08-11 PROCEDURE — 99284 EMERGENCY DEPT VISIT MOD MDM: CPT

## 2022-08-11 RX ORDER — KETOROLAC TROMETHAMINE 30 MG/ML
30 INJECTION, SOLUTION INTRAMUSCULAR; INTRAVENOUS
Status: DISCONTINUED | OUTPATIENT
Start: 2022-08-11 | End: 2022-08-11

## 2022-08-11 RX ORDER — KETOROLAC TROMETHAMINE 30 MG/ML
30 INJECTION, SOLUTION INTRAMUSCULAR; INTRAVENOUS
Status: COMPLETED | OUTPATIENT
Start: 2022-08-11 | End: 2022-08-11

## 2022-08-11 RX ADMIN — KETOROLAC TROMETHAMINE 30 MG: 30 INJECTION, SOLUTION INTRAMUSCULAR at 03:29

## 2022-08-11 NOTE — ED PROVIDER NOTES
EMERGENCY DEPARTMENT HISTORY AND PHYSICAL EXAM    3:23 AM    Date: 8/11/2022  Patient Name: Dejan Brown    History of Presenting Illness     Chief Complaint   Patient presents with    Leg Pain     Patient complains of bi-lat leg/stump pain X 1 month. He reports hx: Chronic leg/stump pain and bi-lat BKA. History Provided By: Patient  Location/Duration/Severity/Modifying factors   HPI  Dejan Brown is a 60 y.o. 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 He says that for the last several months has been having pain and irritation of bilateral stumps where his prosthetics fit. He complains of a callus on the right area. He says that he needs to be refitted for new prosthetics and its been about a year since last time he saw someone for this. He says that he has appointment coming up soon for it. He denies any redness but reports chronic swelling. No fevers or chills. No other alleviating or aggravating factors. PCP: None    Current Outpatient Medications   Medication Sig Dispense Refill    gabapentin (NEURONTIN) 300 mg capsule Take 1 Capsule by mouth three (3) times daily. Max Daily Amount: 900 mg. 60 Capsule 0    oxyCODONE-acetaminophen (PERCOCET) 5-325 mg per tablet Take 1 Tab by mouth every six (6) hours as needed for Pain for up to 12 doses. Max Daily Amount: 4 Tabs. 12 Tab 0       Past History     Past Medical History:  Past Medical History:   Diagnosis Date    Anxiety     Depression     Enlarged prostate     PTSD (post-traumatic stress disorder)        Past Surgical History:  Past Surgical History:   Procedure Laterality Date    HX ORTHOPAEDIC      double amputee       Family History:  No family history on file. Social History:  Social History     Tobacco Use    Smoking status: Every Day   Substance Use Topics    Alcohol use:  Yes Drug use: Yes     Types: Marijuana, Cocaine     Comment: smokes daily       Allergies:  No Known Allergies    I reviewed and confirmed the above information with patient and updated as necessary. Review of Systems     Review of Systems   Constitutional:  Negative for diaphoresis and fever. HENT:  Negative for ear pain and sore throat. Eyes:         No acute change in vision   Respiratory:  Negative for cough and shortness of breath. Cardiovascular:  Negative for chest pain and leg swelling. Gastrointestinal:  Negative for abdominal pain and vomiting. Genitourinary:  Negative for dysuria. Musculoskeletal:  Positive for arthralgias. Negative for neck pain. Skin:  Negative for wound. Neurological:  Negative for weakness and headaches. Physical Exam   Visit Vitals  /70 (BP 1 Location: Left upper arm, BP Patient Position: Sitting)   Pulse 70   Temp 97.6 °F (36.4 °C)   Resp 18   Ht 5' 10\" (1.778 m)   Wt 63.5 kg (140 lb)   SpO2 97%   BMI 20.09 kg/m²       Physical Exam  Vitals and nursing note reviewed. Constitutional:       Appearance: Normal appearance. He is not ill-appearing. Comments: Adult male resting comfortably, nondistressed   HENT:      Mouth/Throat:      Mouth: Mucous membranes are moist.   Eyes:      Pupils: Pupils are equal, round, and reactive to light. Cardiovascular:      Rate and Rhythm: Normal rate and regular rhythm. Pulses: Normal pulses. Heart sounds: Normal heart sounds. Pulmonary:      Effort: Pulmonary effort is normal.      Breath sounds: Normal breath sounds. Abdominal:      General: Abdomen is flat. Tenderness: There is no abdominal tenderness. Musculoskeletal:         General: No swelling. Normal range of motion. Cervical back: Normal range of motion. Comments: Bilateral BKA, stumps are clean, dry and intact, no erythema, no significant swelling   Skin:     General: Skin is warm.    Neurological:      General: No focal deficit present. Mental Status: He is alert and oriented to person, place, and time. Diagnostic Study Results     Labs -  No results found for this or any previous visit (from the past 24 hour(s)). Radiologic Studies -   No orders to display           Medical Decision Making   I am the first provider for this patient. I reviewed the vital signs, available nursing notes, past medical history, past surgical history, family history and social history. Vital Signs-Reviewed the patient's vital signs. Records Reviewed: Nursing Notes and Old Medical Records (Time of Review: 3:23 AM)    Provider Notes (Medical Decision Making):   MDM  48-year-old male here with chronic pain in bilateral stumps, no evidence of infection, fracture, trauma, injury    ED Course: Progress Notes, Reevaluation, and Consults:  Patient is afebrile and hemodynamically normal  Overall very reassuring exam, reviewed recent work-ups, do not feel that more is indicated today with normal vital signs    Will treat symptomatically with Toradol, refer him to follow-up with his PCP. Recently prescribed Percocet, will not refill, he says that he has not filled this yet. He was discharged home in stable condition with return precautions given. Diagnosis     Clinical Impression:   1. Chronic pain of both lower extremities    2. S/P bilateral BKA (below knee amputation) ([facility] [address]        Dispo: Home    Follow-up Information       Follow up With Specialties Details Why [address]    [facility] EMERGENCY DEPT Emergency Medicine  As needed, If symptoms worsen [address] [place] [id number]  [phone]             Discharge Medication List as of 8/11/2022  3:34 AM        CONTINUE these medications which have NOT CHANGED    Details   gabapentin (NEURONTIN) 300 mg capsule Take 1 Capsule by mouth three (3) times daily.  Max Daily Amount: 900 mg., Normal, Disp-60 Capsule, R-0      oxyCODONE-acetaminophen (PERCOCET) 5-325 mg per tablet Take 1 Tab by mouth every six (6) hours as needed for Pain for up to 12 doses. Max Daily Amount: 4 Tabs., Print, Disp-12 Tab, R-0             Shanelle Ac MD   Emergency Medicine   August 11, 2022, 3:23 AM     This note is dictated utilizing Dragon voice recognition software. Unfortunately this leads to occasional typographical errors using the voice recognition. I apologize in advance if the situation occurs. If questions occur please do not hesitate to contact me directly.     Caden Hooper MD

## 2022-08-11 NOTE — DISCHARGE INSTRUCTIONS
Please return to the ER with any new or worsening symptoms or any other concerns. Please return to the Emergency Department if you develop a fever, chills, cannot eat or drink due to nausea or vomiting, or if any of your symptoms worsen. Also, It is extremely important that you follow-up with a primary care physician and if you do not have one currently use the contact information provided to obtain an appointment. If none was provided please call the number on the back of your insurance card to locate a Primary care doctor. Many offices have \"cancellation lists\" that you can ask to be placed on; should a patient with an earlier appointment cancel you will be notified to take their place. Please return to the Emergency Room immediately if your symptoms worsen. Please carefully read all discharge instructions    [email or web address]    What are GoodRx coupons? GoodRx coupons will help you pay less than the cash price for your prescription. [employer] free to use and are accepted at virtually every [place] pharmacy. Your pharmacist will know how to enter the codes on the coupon to pull up the lowest discount available.

## 2022-09-11 ENCOUNTER — HOSPITAL ENCOUNTER (EMERGENCY)
Age: 62
Discharge: HOME | End: 2022-09-11
Attending: EMERGENCY MEDICINE
Payer: MEDICAID

## 2022-09-11 VITALS
TEMPERATURE: 97.7 F | DIASTOLIC BLOOD PRESSURE: 62 MMHG | RESPIRATION RATE: 20 BRPM | SYSTOLIC BLOOD PRESSURE: 109 MMHG | HEART RATE: 84 BPM | HEIGHT: 70 IN | BODY MASS INDEX: 20.04 KG/M2 | OXYGEN SATURATION: 100 % | WEIGHT: 140 LBS

## 2022-09-11 DIAGNOSIS — T87.89 AMPUTATION STUMP PAIN (HCC): Primary | ICD-10-CM

## 2022-09-11 DIAGNOSIS — M79.609 AMPUTATION STUMP PAIN (HCC): Primary | ICD-10-CM

## 2022-09-11 PROCEDURE — 99283 EMERGENCY DEPT VISIT LOW MDM: CPT

## 2022-09-11 RX ORDER — TRAMADOL HYDROCHLORIDE 50 MG/1
50 TABLET ORAL
Status: DISCONTINUED | OUTPATIENT
Start: 2022-09-11 | End: 2022-09-11

## 2022-09-12 ENCOUNTER — HOSPITAL ENCOUNTER (EMERGENCY)
Age: 62
Discharge: HOME | End: 2022-09-12
Attending: EMERGENCY MEDICINE
Payer: MEDICAID

## 2022-09-12 ENCOUNTER — APPOINTMENT (OUTPATIENT)
Dept: GENERAL RADIOLOGY | Age: 62
End: 2022-09-12
Attending: PHYSICIAN ASSISTANT
Payer: MEDICAID

## 2022-09-12 VITALS
DIASTOLIC BLOOD PRESSURE: 73 MMHG | HEIGHT: 70 IN | OXYGEN SATURATION: 98 % | HEART RATE: 107 BPM | TEMPERATURE: 97.5 F | BODY MASS INDEX: 20.04 KG/M2 | SYSTOLIC BLOOD PRESSURE: 131 MMHG | WEIGHT: 140 LBS | RESPIRATION RATE: 18 BRPM

## 2022-09-12 DIAGNOSIS — M79.644 PAIN OF RIGHT THUMB: Primary | ICD-10-CM

## 2022-09-12 PROCEDURE — 73140 X-RAY EXAM OF FINGER(S): CPT

## 2022-09-12 PROCEDURE — 99283 EMERGENCY DEPT VISIT LOW MDM: CPT

## 2022-09-12 RX ORDER — ACETAMINOPHEN 325 MG/1
650 TABLET ORAL
Qty: 20 TABLET | Refills: 0 | Status: SHIPPED | OUTPATIENT
Start: 2022-09-12

## 2022-09-12 NOTE — ED TRIAGE NOTES
Pt arrived with c.o \"pain all over, phantom pain in his BKA's and swelling in his BKA. \"  Pt also endorsing \"blood in his stool, but I don't need a finger up my ass or a catheter or anything\".

## 2022-09-12 NOTE — ED PROVIDER NOTES
EMERGENCY DEPARTMENT HISTORY AND PHYSICAL EXAM    Date: 9/12/2022  Patient Name: Jovana Cortez    History of Presenting Illness     Chief Complaint   Patient presents with    Other         History Provided By: Patient    9:02 AM  Jovana Cortez is a 60 y.o. male with PMHX of anxiety, PTSD, BPH, bilateral BKA's and multiple partial finger amputations who presents to the emergency department C/O concern for splinter in his right thumb. 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 He also states he has chronic pain in his stumps secondary to weight loss and ill fitting prosthetics. He does not currently have a primary care doctor and requests  a recommendation. Pt denies injury, trauma, fall, and any other sxs or complaints. PCP: None    Current Outpatient Medications   Medication Sig Dispense Refill    acetaminophen (TYLENOL) 325 mg tablet Take 2 Tablets by mouth every four (4) hours as needed for Pain. 20 Tablet 0    gabapentin (NEURONTIN) 300 mg capsule Take 1 Capsule by mouth three (3) times daily. Max Daily Amount: 900 mg. 60 Capsule 0       Past History     Past Medical History:  Past Medical History:   Diagnosis Date    Anxiety     Depression     Enlarged prostate     PTSD (post-traumatic stress disorder)        Past Surgical History:  Past Surgical History:   Procedure Laterality Date    HX ORTHOPAEDIC      double amputee       Family History:  No family history on file. Social History:  Social History     Tobacco Use    Smoking status: Every Day   Substance Use Topics    Alcohol use: Yes    Drug use: Yes     Types: Marijuana, Cocaine     Comment: smokes daily       Allergies:  No Known Allergies      Review of Systems   Review of Systems   Constitutional: Negative. Musculoskeletal:  Positive for arthralgias, joint swelling and myalgias. Skin:  Positive for wound (possible FB).    All other systems reviewed and are negative. Physical Exam     Vitals:    09/12/22 0821   BP: 131/73   Pulse: (!) 107   Resp: 18   Temp: 97.5 °F (36.4 °C)   SpO2: 98%   Weight: 63.5 kg (140 lb)   Height: 5' 10\" (1.778 m)     Physical Exam  Vital signs and nursing notes reviewed. CONSTITUTIONAL: Alert. Well-appearing; thin male; in no apparent distress. HEAD: Normocephalic; atraumatic. CV: Normal S1, S2; no murmurs, rubs, or gallops. RESPIRATORY: Normal chest excursion with respiration; breath sounds clear and equal bilaterally; no wheezes, rhonchi, or rales. EXT: Bilateral BKAs, nontender, no significant swelling erythema or tenderness. No open wounds but there is some thickened callus to his right anterior leg from rubbing on prosthetic. Right hand: Status post right thumb amputation at the IP joint with a small callus to the tip of the finger, no obvious foreign body felt or seen. SKIN: Normal for age and race; warm; dry; good turgor; no apparent lesions or exudate. NEURO: A & O x3. PSYCH:  Mood and affect appropriate. Diagnostic Study Results     Labs -   No results found for this or any previous visit (from the past 12 hour(s)). Radiologic Studies -   XR THUMB RT MIN 2 V   Final Result   No radiopaque foreign body identified. CT Results  (Last 48 hours)      None          CXR Results  (Last 48 hours)      None            Medications given in the ED-  Medications - No data to display      Medical Decision Making   I am the first provider for this patient. I reviewed the vital signs, available nursing notes, past medical history, past surgical history, family history and social history. Vital Signs-Reviewed the patient's vital signs. Records Reviewed: Nursing Notes and Old Medical Records      Procedures:  Procedures    ED Course:  9:02 AM   Initial assessment performed.  The patients presenting problems have been discussed, and they are in agreement with the care plan formulated and outlined with them. I have encouraged them to ask questions as they arise throughout their visit. Provider Notes (Medical Decision Making): Humaira Rogers is a 60 y.o. male presenting with a chief complaint of concern that his right thumb had a splinter in it, partially amputated thumb has some mild callus to the tip of it but no erythema, foreign body or wounds. X-ray of the right thumb shows no foreign body. He also mentioned that his prosthetics do not fit well since he lost weight and sometimes rub his shins. He also requested PCP.  consulted to assist with PCP follow-up and actually made an appointment for tomorrow. Patient to take Tylenol as needed for pain and follow-up with PCP. Diagnosis and Disposition       DISCHARGE NOTE:    Converse Saúl's  results have been reviewed with him. He has been counseled regarding his diagnosis, treatment, and plan. He verbally conveys understanding and agreement of the signs, symptoms, diagnosis, treatment and prognosis and additionally agrees to follow up as discussed. He also agrees with the care-plan and conveys that all of his questions have been answered. I have also provided discharge instructions for him that include: educational information regarding their diagnosis and treatment, and list of reasons why they would want to return to the ED prior to their follow-up appointment, should his condition change. He has been provided with education for proper emergency department utilization. CLINICAL IMPRESSION:    1. Pain of right thumb        PLAN:  1. D/C Home  2.    Discharge Medication List as of 9/12/2022  9:21 AM        START taking these medications    Details   acetaminophen (TYLENOL) 325 mg tablet Take 2 Tablets by mouth every four (4) hours as needed for Pain., Normal, Disp-20 Tablet, R-0           CONTINUE these medications which have NOT CHANGED    Details   gabapentin (NEURONTIN) 300 mg capsule Take 1 Capsule by mouth three (3) times daily. Max Daily Amount: 900 mg., Normal, Disp-60 Capsule, R-0           STOP taking these medications       oxyCODONE-acetaminophen (PERCOCET) 5-325 mg per tablet Comments:   Reason for Stopping:             3.   Follow-up Information       Follow up With Specialties Details Why Contact [facility]   tomorrow at [address] [address] [phone]    [facility] EMERGENCY DEPT Emergency Medicine  As needed, If symptoms worsen [address] [id number]  [phone]          _______________________________      Please note that this dictation was completed with [employer], the computer voice recognition software. Quite often unanticipated grammatical, syntax, homophones, and other interpretive errors are inadvertently transcribed by the computer software. Please disregard these errors. Please excuse any errors that have escaped final proofreading.

## 2022-09-12 NOTE — ED PROVIDER NOTES
69-year-old male past medical history of anxiety depression enlarged prostate PTSD and amputation of legs and fingers. Patient presents to the emergency department with pain in his right stump leg. History of similar episode. Patient states he has been walking around too much on his leg. Patient has no other issues no treatment with pain medication has a appointment with his primary care doctor soon. Patient was found today to have been allegedly stealing from a store. Police were called. Somehow this led to him coming to the emergency department by EMS. Patient has no other complaints and he feels comfortable knowing that because he has had this issue in the past he knows of how to take care of it at home. Past Medical History:   Diagnosis Date    Anxiety     Depression     Enlarged prostate     PTSD (post-traumatic stress disorder)        Past Surgical History:   Procedure Laterality Date    HX ORTHOPAEDIC      double amputee         No family history on file. Social History     Socioeconomic History    Marital status: SINGLE     Spouse name: Not on file    Number of children: Not on file    Years of education: Not on file    Highest education level: Not on file   Occupational History    Not on file   Tobacco Use    Smoking status: Every Day    Smokeless tobacco: Not on file   Substance and Sexual Activity    Alcohol use: Yes    Drug use: Yes     Types: Marijuana, Cocaine     Comment: smokes daily    Sexual activity: Not on file   Other Topics Concern    Not on file   Social History Narrative    Not on file     Social Determinants of Health     Financial Resource Strain: Not on file   Food Insecurity: Not on file   Transportation Needs: Not on file   Physical Activity: Not on file   Stress: Not on file   Social Connections: Not on file   Intimate Partner Violence: Not on file   Housing Stability: Not on file         ALLERGIES: Patient has no known allergies.     Review of Systems   Constitutional: Negative. Respiratory: Negative. Cardiovascular: Negative. Gastrointestinal: Negative. Genitourinary: Negative. Musculoskeletal: Negative. Neurological: Negative. Hematological: Negative. Psychiatric/Behavioral: Negative. All other systems reviewed and are negative. Vitals:    09/11/22 2138   BP: 109/62   Pulse: 84   Resp: 20   Temp: 97.7 °F (36.5 °C)   SpO2: 100%   Weight: 63.5 kg (140 lb)   Height: 5' 10\" (1.778 m)            Physical Exam  Vitals reviewed. Constitutional:       General: He is not in acute distress. Appearance: Normal appearance. He is not ill-appearing, toxic-appearing or diaphoretic. HENT:      Head: Normocephalic and atraumatic. Nose: Nose normal.      Mouth/Throat:      Mouth: Mucous membranes are moist.   Eyes:      Extraocular Movements: Extraocular movements intact. Neck:      Vascular: No carotid bruit. Cardiovascular:      Rate and Rhythm: Normal rate and regular rhythm. Pulmonary:      Effort: Pulmonary effort is normal. No respiratory distress. Breath sounds: Normal breath sounds. No stridor. No wheezing, rhonchi or rales. Chest:      Chest wall: No tenderness. Abdominal:      General: There is no distension. Palpations: Abdomen is soft. There is no mass. Tenderness: There is no abdominal tenderness. There is no right CVA tenderness, left CVA tenderness, guarding or rebound. Hernia: No hernia is present. Musculoskeletal:         General: Tenderness present. No swelling. Normal range of motion. Cervical back: Normal range of motion and neck supple. No rigidity or tenderness. Comments: Mild tenderness right leg stump no signs of infection or trauma   Lymphadenopathy:      Cervical: No cervical adenopathy. Skin:     General: Skin is warm. Capillary Refill: Capillary refill takes less than 2 seconds. Coloration: Skin is not jaundiced or pale.       Findings: No bruising, erythema, lesion or rash.   Neurological:      General: No focal deficit present. Mental Status: He is alert and oriented to person, place, and time. Cranial Nerves: No cranial nerve deficit. Psychiatric:         Mood and Affect: Mood normal.         Behavior: Behavior normal.        MDM  Number of Diagnoses or Management Options  Amputation stump pain ([facility] [address]  Diagnosis management comments: Patient is up and out of the emergency department waiting for his He does not want pain medication he just wants to go home and follow-up with PCP.     Differential diagnosis stump infection musculoskeletal pain malingering           Procedures

## 2022-09-14 ENCOUNTER — HOSPITAL ENCOUNTER (EMERGENCY)
Age: 62
Discharge: HOME | End: 2022-09-14
Attending: EMERGENCY MEDICINE
Payer: MEDICAID

## 2022-09-14 VITALS
OXYGEN SATURATION: 100 % | TEMPERATURE: 98 F | HEART RATE: 88 BPM | RESPIRATION RATE: 16 BRPM | DIASTOLIC BLOOD PRESSURE: 78 MMHG | SYSTOLIC BLOOD PRESSURE: 145 MMHG

## 2022-09-14 DIAGNOSIS — Z00.00 GENERAL MEDICAL EXAM: ICD-10-CM

## 2022-09-14 DIAGNOSIS — G54.6 PAIN, PHANTOM LIMB (HCC): ICD-10-CM

## 2022-09-14 DIAGNOSIS — T87.89 AMPUTATION STUMP PAIN (HCC): Primary | ICD-10-CM

## 2022-09-14 DIAGNOSIS — M79.609 AMPUTATION STUMP PAIN (HCC): Primary | ICD-10-CM

## 2022-09-14 PROCEDURE — 96372 THER/PROPH/DIAG INJ SC/IM: CPT

## 2022-09-14 PROCEDURE — 74011250636 HC RX REV CODE- 250/636: Performed by: EMERGENCY MEDICINE

## 2022-09-14 PROCEDURE — 99284 EMERGENCY DEPT VISIT MOD MDM: CPT

## 2022-09-14 RX ORDER — GABAPENTIN 300 MG/1
300 CAPSULE ORAL 3 TIMES DAILY
Qty: 60 CAPSULE | Refills: 0 | Status: SHIPPED | OUTPATIENT
Start: 2022-09-14

## 2022-09-14 RX ORDER — SERTRALINE HYDROCHLORIDE 50 MG/1
50 TABLET, FILM COATED ORAL DAILY
Qty: 60 TABLET | Refills: 0 | Status: SHIPPED | OUTPATIENT
Start: 2022-09-14

## 2022-09-14 RX ORDER — KETOROLAC TROMETHAMINE 30 MG/ML
30 INJECTION, SOLUTION INTRAMUSCULAR; INTRAVENOUS ONCE
Status: COMPLETED | OUTPATIENT
Start: 2022-09-14 | End: 2022-09-14

## 2022-09-14 RX ADMIN — KETOROLAC TROMETHAMINE 30 MG: 30 INJECTION, SOLUTION INTRAMUSCULAR at 04:41

## 2022-09-14 NOTE — ED PROVIDER NOTES
EMERGENCY DEPARTMENT HISTORY AND PHYSICAL EXAM    Date: 9/14/2022  Patient Name: Stalin Garcia    History of Presenting Illness     Chief Complaint   Patient presents with    Leg Pain         History Provided By: Patient, EMS, and previous medical records and visits    Additional History (Context):   4:20 AM  Brayden Romanoron Sepulveda is a 60 y.o. male with PMHX of hypertension, bilateral BKA along with multiple finger amputations, history of tularemia, PTSD along with depression suicidal attempts, who presents to the emergency department C/O distal stump pain. Patient was brought in by ambulance from [facility] for pain to his stumps. This is a 6 emergency department visit since beginning on September 11, 3 days ago. His complaints have been various including falls and his chronic pain to both stumps, hypertension, amputation stump pain, pain in the stomach, ECU with medical clearance, general medical exam, and now again stump pain. I called and reviewed the case with [facility] who states that his roommate had visited the [facility] asking for an [address] The patient was seen and evaluated by community service Board and cleared without event. He comments that he was on trazodone for stents as well as Zoloft and has been out of his medications for some time. He tells me he has been out of custodial for 1 or 2 weeks however communication with [facility] reports that has been over 3 months. Tells me he has a place to stay at the [facility] facility. He denies HI or SI chest pain shortness of breath vomiting. Social History  He has a history of alcohol tobacco and drug use however denies any recent usage over the last several months. Family History  Father with hypertension but otherwise denies any specific family history. PCP: None    Current Outpatient Medications   Medication Sig Dispense Refill    gabapentin (NEURONTIN) 300 mg capsule Take 1 Capsule by mouth three (3) times daily.  Max Daily Amount: 900 mg. 60 Capsule 0    sertraline (Zoloft) 50 mg tablet Take 1 Tablet by mouth daily. 60 Tablet 0    acetaminophen (TYLENOL) 325 mg tablet Take 2 Tablets by mouth every four (4) hours as needed for Pain. 20 Tablet 0       Past History     Past Medical History:  Past Medical History:   Diagnosis Date    Anxiety     Depression     Enlarged prostate     PTSD (post-traumatic stress disorder)        Past Surgical History:  Past Surgical History:   Procedure Laterality Date    HX ORTHOPAEDIC      double amputee       Family History:  No family history on file. Social History:  Social History     Tobacco Use    Smoking status: Every Day   Substance Use Topics    Alcohol use: Yes    Drug use: Yes     Types: Marijuana, Cocaine     Comment: smokes daily       Allergies: Allergies   Allergen Reactions    Mold Other (comments)     Runny nose, sneezing, itchy nose         Review of Systems   Review of Systems   Musculoskeletal:  Positive for arthralgias (Stiff joints and fingers). Skin:  Positive for wound (Incisional pain long ago from amputation). Physical Exam     Vitals:    09/14/22 0337 09/14/22 0409   BP: (!) 145/78    Pulse: 88    Resp: 16    Temp: 98 °F (36.7 °C)    SpO2: 100% 100%     Physical Exam  Vitals and nursing note reviewed. Constitutional:       General: He is not in acute distress. Appearance: He is well-developed. He is not diaphoretic. Comments: Lean well-appearing nontoxic   HENT:      Head: Normocephalic and atraumatic. Eyes:      General: No scleral icterus. Extraocular Movements:      Right eye: Normal extraocular motion. Left eye: Normal extraocular motion. Conjunctiva/sclera: Conjunctivae normal.      Pupils: Pupils are equal, round, and reactive to light. Neck:      Trachea: No tracheal deviation. Cardiovascular:      Rate and Rhythm: Normal rate and regular rhythm. Heart sounds: Normal heart sounds.    Pulmonary:      Effort: Pulmonary effort is normal. No respiratory distress. Breath sounds: Normal breath sounds. No stridor. Abdominal:      General: Bowel sounds are normal. There is no distension. Palpations: Abdomen is soft. Tenderness: There is no abdominal tenderness. There is no rebound. Musculoskeletal:         General: No tenderness. Normal range of motion. Cervical back: Normal range of motion and neck supple. Comments: Bilateral lower extremity limitations with good closure. There is no visible foreign body there was no swelling. Multiple finger amputations   Skin:     General: Skin is warm and dry. Capillary Refill: Capillary refill takes less than 2 seconds. Findings: No erythema or rash. Neurological:      Mental Status: He is alert and oriented to person, place, and time. GCS: GCS eye subscore is 4. GCS verbal subscore is 5. GCS motor subscore is 6. Cranial Nerves: No cranial nerve deficit. Motor: No weakness. Coordination: Coordination is intact. Psychiatric:         Attention and Perception: Attention normal.         Mood and Affect: Mood normal.         Behavior: Behavior normal.         Thought Content: Thought content normal.         Cognition and Memory: Cognition normal.         Judgment: Judgment normal.     Diagnostic Study Results     Labs -  No results found for this or any previous visit (from the past 24 hour(s)). Radiologic Studies -   No orders to display     CT Results  (Last 48 hours)      None          CXR Results  (Last 48 hours)      None            Medications given in the ED-  Medications   ketorolac tromethamine (TORADOL) 60 mg/2 mL injection 30 mg (30 mg IntraMUSCular Given 9/14/22 0441)         Medical Decision Making   I am the first provider for this patient. I reviewed the vital signs, available nursing notes, past medical history, past surgical history, family history and social history. Vital Signs-Reviewed the patient's vital signs.     Pulse Oximetry Analysis - 100% on room air    Records Reviewed: NURSING NOTES AND PREVIOUS MEDICAL RECORDS    Provider Notes (Medical Decision Making):   Patient's history of chronic pain and history of psychiatric agitation who is actually appears to be somewhat short tempered but not quite with rapid and pressured speech. Believe he is genuine pain but this is likely chronic nerve stimulus exacerbated by extensive history of alcohol use cocaine abuse. He does communicate this is perfectly reasonable. Foreign body is possible but doubtful given the appearance. His legs do not look infected. Patient has arthritic changes. At this point looking at our examination is previous this is a chronic pain issue. We will give him a shot of Toradol refill medications of Zoloft and gabapentin. Afterwards will discharge and recommend follow-up with his clinic for prosthesis adjustment and evaluation which is his verbal plan before I suggested. Procedures:  Procedures    ED Course:   4:20 AM: Initial assessment performed. The patients presenting problems have been discussed, and they are in agreement with the care plan formulated and outlined with them. I have encouraged them to ask questions as they arise throughout their visit. Diagnosis and Disposition       DISCHARGE NOTE:  5:07 AM  Brayden Hays's  results have been reviewed with him. He has been counseled regarding his diagnosis, treatment, and plan. He verbally conveys understanding and agreement of the signs, symptoms, diagnosis, treatment and prognosis and additionally agrees to follow up as discussed. He also agrees with the care-plan and conveys that all of his questions have been answered. I have also provided discharge instructions for him that include: educational information regarding their diagnosis and treatment, and list of reasons why they would want to return to the ED prior to their follow-up appointment, should his condition change.  He has been provided with education for proper emergency department utilization. CLINICAL IMPRESSION:    1. Amputation stump pain ([facility] [address]    2. General medical exam    3. Pain, phantom limb ([facility] [address]        PLAN:  1. D/C Home  2. Discharge Medication List as of 9/14/2022  5:17 AM        START taking these medications    Details   sertraline (Zoloft) 50 mg tablet Take 1 Tablet by mouth daily. , Normal, Disp-60 Tablet, R-0           CONTINUE these medications which have CHANGED    Details   gabapentin (NEURONTIN) 300 mg capsule Take 1 Capsule by mouth three (3) times daily. Max Daily Amount: 900 mg., Normal, Disp-60 Capsule, R-0           CONTINUE these medications which have NOT CHANGED    Details   acetaminophen (TYLENOL) 325 mg tablet Take 2 Tablets by mouth every four (4) hours as needed for Pain., Normal, Disp-20 Tablet, R-0           3. Follow-up Information       Follow up With Specialties Details Why Contact Info    [facility]    [address] [facility], [facility]. [address] [id number] [facility] [address]  [phone]          _______________________________    This note was partially transcribed via voice recognition software. Although efforts have been made to catch any discrepancies, it may contain sound alike words, grammatical errors, or nonsensical words.

## 2022-09-19 ENCOUNTER — HOSPITAL ENCOUNTER (EMERGENCY)
Age: 62
Discharge: HOME | End: 2022-09-19
Attending: EMERGENCY MEDICINE
Payer: MEDICAID

## 2022-09-19 ENCOUNTER — APPOINTMENT (OUTPATIENT)
Dept: GENERAL RADIOLOGY | Age: 62
End: 2022-09-19
Attending: EMERGENCY MEDICINE
Payer: MEDICAID

## 2022-09-19 VITALS
TEMPERATURE: 97.6 F | HEART RATE: 72 BPM | SYSTOLIC BLOOD PRESSURE: 145 MMHG | DIASTOLIC BLOOD PRESSURE: 64 MMHG | OXYGEN SATURATION: 100 % | RESPIRATION RATE: 16 BRPM

## 2022-09-19 DIAGNOSIS — T87.89 AMPUTATION STUMP PAIN (HCC): Primary | ICD-10-CM

## 2022-09-19 DIAGNOSIS — G89.28 OTHER CHRONIC POSTPROCEDURAL PAIN: ICD-10-CM

## 2022-09-19 DIAGNOSIS — M79.609 AMPUTATION STUMP PAIN (HCC): Primary | ICD-10-CM

## 2022-09-19 PROCEDURE — 99283 EMERGENCY DEPT VISIT LOW MDM: CPT

## 2022-09-19 PROCEDURE — 73590 X-RAY EXAM OF LOWER LEG: CPT

## 2022-09-19 RX ORDER — ACETAMINOPHEN 325 MG/1
975 TABLET ORAL
Status: DISCONTINUED | OUTPATIENT
Start: 2022-09-19 | End: 2022-09-19 | Stop reason: HOSPADM

## 2022-09-19 NOTE — ED PROVIDER NOTES
EMERGENCY DEPARTMENT HISTORY AND PHYSICAL EXAM    Date: 9/19/2022  Patient Name: Shanda Doran    History of Presenting Illness     Chief Complaint   Patient presents with    Leg Pain         History Provided By: Patient, EMS, and previous medical records and visits    Additional History (Context):   2:02 AM  Brayden Salas is a 60 y.o. male with PMHX of hypertension, BKA along with multiple finger amputations, history of tularemia, PTSD along with depression and suicide attempts and cocaine abuse who presents to the emergency department C/O leg pain. ECOG 1 1 for paramedics for assistance with his legs. Patient comes from [place] again by passing several other hospitals asking for assistance. This is the seventh emergency department visit beginning on September 11. His pain is the same pointing to his stumps although his sensation this time is different. Denies any fevers chills or injuries or changes in prosthetics. He still has not seen his providers in the prosthetics clinic. I gave him prescription for Zoloft and gabapentin however he has not had picked up this prescription. Social History  He has a history of alcohol tobacco and drug use however denies any recent usage over the last several months. Family History  Father with hypertension but otherwise no specific family history     PCP: None    Current Outpatient Medications   Medication Sig Dispense Refill    gabapentin (NEURONTIN) 300 mg capsule Take 1 Capsule by mouth three (3) times daily. Max Daily Amount: 900 mg. 60 Capsule 0    sertraline (Zoloft) 50 mg tablet Take 1 Tablet by mouth daily. 60 Tablet 0    acetaminophen (TYLENOL) 325 mg tablet Take 2 Tablets by mouth every four (4) hours as needed for Pain.  20 Tablet 0       Past History     Past Medical History:  Past Medical History:   Diagnosis Date    Anxiety     Depression     Enlarged prostate     PTSD (post-traumatic stress disorder)        Past Surgical History:  Past Surgical History:   Procedure Laterality Date    HX ORTHOPAEDIC      double amputee       Family History:  No family history on file. Social History:  Social History     Tobacco Use    Smoking status: Every Day   Substance Use Topics    Alcohol use: Yes    Drug use: Yes     Types: Marijuana, Cocaine     Comment: smokes daily       Allergies: Allergies   Allergen Reactions    Mold Other (comments)     Runny nose, sneezing, itchy nose         Review of Systems   Review of Systems   Musculoskeletal:  Positive for arthralgias. Skin:  Positive for wound. All other systems reviewed and are negative. Physical Exam     Vitals:    09/19/22 0213   BP: (!) 145/64   Pulse: 72   Resp: 16   Temp: 97.6 °F (36.4 °C)   SpO2: 100%     Physical Exam  Vitals and nursing note reviewed. Constitutional:       General: He is not in acute distress. Appearance: He is well-developed. He is not diaphoretic. HENT:      Head: Normocephalic and atraumatic. Eyes:      General: No scleral icterus. Extraocular Movements:      Right eye: Normal extraocular motion. Left eye: Normal extraocular motion. Conjunctiva/sclera: Conjunctivae normal.      Pupils: Pupils are equal, round, and reactive to light. Neck:      Trachea: No tracheal deviation. Cardiovascular:      Rate and Rhythm: Normal rate and regular rhythm. Heart sounds: Normal heart sounds. Pulmonary:      Effort: Pulmonary effort is normal. No respiratory distress. Breath sounds: Normal breath sounds. No stridor. Abdominal:      General: Bowel sounds are normal. There is no distension. Palpations: Abdomen is soft. Tenderness: There is no abdominal tenderness. There is no rebound. Musculoskeletal:         General: No tenderness. Normal range of motion. Cervical back: Normal range of motion and neck supple. Comments: Multiple finger amputations. Bilateral leg well approximated wound. He has no knee swelling or effusion. Skin:     General: Skin is warm and dry. Findings: No erythema or rash. Comments: Inspection of his wound reveals no redness swelling or foreign body. Neurological:      Mental Status: He is alert and oriented to person, place, and time. Cranial Nerves: No cranial nerve deficit. Motor: No weakness. Psychiatric:         Mood and Affect: Mood normal.         Behavior: Behavior normal.         Thought Content: Thought content normal.         Judgment: Judgment normal.     Diagnostic Study Results     Labs -  No results found for this or any previous visit (from the past 24 hour(s)). Radiologic Studies -   XR TIB/FIB RT   Final Result      1. Prior below the knee amputation with soft tissue swelling involving the   stump. Mild cortical irregularity involving the medial aspect of the tibial   stump margin which is nonspecific and is of unknown chronicity. Osteomyelitis is   not excluded at this location. CT Results  (Last 48 hours)      None          CXR Results  (Last 48 hours)      None            Medications given in the ED-  Medications - No data to display      Medical Decision Making   I am the first provider for this patient. I reviewed the vital signs, available nursing notes, past medical history, past surgical history, family history and social history. Vital Signs-Reviewed the patient's vital signs. Pulse Oximetry Analysis - 100% on room air      Records Reviewed: NURSING NOTES AND PREVIOUS MEDICAL RECORDS    Provider Notes (Medical Decision Making):   X-rays were taken look for evidence of foreign body or material within the incision. Stumps show mostly well-corticated ends but unlikely evidence of an osseous changes. Procedures:  Procedures    ED Course:   2:02 AM: Initial assessment performed. The patients presenting problems have been discussed, and they are in agreement with the care plan formulated and outlined with them.   I have encouraged them to ask questions as they arise throughout their visit. Diagnosis and Disposition       DISCHARGE NOTE:  3:34 AM  Brayden Hays's  results have been reviewed with him. He has been counseled regarding his diagnosis, treatment, and plan. He verbally conveys understanding and agreement of the signs, symptoms, diagnosis, treatment and prognosis and additionally agrees to follow up as discussed. He also agrees with the care-plan and conveys that all of his questions have been answered. I have also provided discharge instructions for him that include: educational information regarding their diagnosis and treatment, and list of reasons why they would want to return to the ED prior to their follow-up appointment, should his condition change. He has been provided with education for proper emergency department utilization. CLINICAL IMPRESSION:    1. Amputation stump pain ([address]    2. Other chronic postprocedural pain        PLAN:  1. D/C Home  2. Discharge Medication List as of 9/19/2022  3:42 AM        3. Follow-up Information       Follow up With Specialties Details Why Marques5 Francisco Hughes  Schedule an appointment as soon as possible for a visit   [address]  [phone]          _______________________________    This note was partially transcribed via voice recognition software. Although efforts have been made to catch any discrepancies, it may contain sound alike words, grammatical errors, or nonsensical words.

## 2022-10-20 VITALS
BODY MASS INDEX: 20.09 KG/M2 | OXYGEN SATURATION: 98 % | SYSTOLIC BLOOD PRESSURE: 122 MMHG | TEMPERATURE: 98 F | WEIGHT: 140 LBS | RESPIRATION RATE: 18 BRPM | DIASTOLIC BLOOD PRESSURE: 74 MMHG | HEART RATE: 84 BPM

## 2022-10-20 PROCEDURE — 99283 EMERGENCY DEPT VISIT LOW MDM: CPT

## 2022-10-21 ENCOUNTER — HOSPITAL ENCOUNTER (EMERGENCY)
Age: 62
Discharge: HOME | End: 2022-10-21
Attending: EMERGENCY MEDICINE
Payer: MEDICAID

## 2022-10-21 DIAGNOSIS — G89.4 CHRONIC PAIN SYNDROME: Primary | ICD-10-CM

## 2022-10-21 PROCEDURE — 74011250637 HC RX REV CODE- 250/637: Performed by: EMERGENCY MEDICINE

## 2022-10-21 RX ORDER — GABAPENTIN 300 MG/1
300 CAPSULE ORAL
Status: COMPLETED | OUTPATIENT
Start: 2022-10-21 | End: 2022-10-21

## 2022-10-21 RX ADMIN — GABAPENTIN 300 MG: 300 CAPSULE ORAL at 05:13

## 2022-10-21 NOTE — ED TRIAGE NOTES
C/O acute on chronic blisters to BKA sites from walking long distances on prosthetics. Pt has had very similar issues and has been seen for same sx multiple times. No obvious severe breakdown/blistering noted on observation. Skin appropriate for ethnicity.

## 2022-10-21 NOTE — ED PROVIDER NOTES
[facility] DEPARTMENT HISTORY AND PHYSICAL EXAM    Date: 10/21/2022  Patient Name: Carline Krause    History of Presenting Illness     Chief Complaint   Patient presents with    Leg Pain         History Provided By: Patient    Additional History (Context):   5:11 AM  Charles Verdugo is a 60 y.o. male with PMHX of hypertension, multiple amputations to all digits of his hand in addition to both feet, chronic pain, PTSD and anxiety homelessness, who presents to the emergency department C/O stump pain to both legs. Patient came in complaining of chronic pain of both stumps. He has no fevers or chills or new injuries. He still on seeing his prosthetics. He has been given prescriptions as not filled any of these medications. Social History  He has a history of alcohol tobacco and drug use however denies any recent usage over the last several months. Family History  Father with hypertension but otherwise no specific family history      PCP: None    Current Outpatient Medications   Medication Sig Dispense Refill    gabapentin (NEURONTIN) 300 mg capsule Take 1 Capsule by mouth three (3) times daily. Max Daily Amount: 900 mg. 60 Capsule 0    sertraline (Zoloft) 50 mg tablet Take 1 Tablet by mouth daily. 60 Tablet 0    acetaminophen (TYLENOL) 325 mg tablet Take 2 Tablets by mouth every four (4) hours as needed for Pain. 20 Tablet 0       Past History     Past Medical History:  Past Medical History:   Diagnosis Date    Anxiety     Depression     Enlarged prostate     PTSD (post-traumatic stress disorder)        Past Surgical History:  Past Surgical History:   Procedure Laterality Date    HX ORTHOPAEDIC      double amputee       Family History:  No family history on file. Social History:  Social History     Tobacco Use    Smoking status: Every Day   Substance Use Topics    Alcohol use: Yes    Drug use: Yes     Types: Marijuana, Cocaine     Comment: smokes daily       Allergies:   Allergies   Allergen Reactions    Mold Other (comments)     Runny nose, sneezing, itchy nose         Review of Systems   Review of Systems   Musculoskeletal:  Positive for arthralgias and gait problem. Skin:  Positive for wound. All other systems reviewed and are negative. Physical Exam     Vitals:    10/20/22 2234   BP: 122/74   Pulse: 84   Resp: 18   Temp: 98 °F (36.7 °C)   SpO2: 98%   Weight: 63.5 kg (140 lb)     Physical Exam  Vitals and nursing note reviewed. Constitutional:       General: He is not in acute distress. Appearance: He is well-developed. He is not diaphoretic. HENT:      Head: Normocephalic and atraumatic. Eyes:      General: No scleral icterus. Extraocular Movements:      Right eye: Normal extraocular motion. Left eye: Normal extraocular motion. Conjunctiva/sclera: Conjunctivae normal.      Pupils: Pupils are equal, round, and reactive to light. Neck:      Trachea: No tracheal deviation. Cardiovascular:      Rate and Rhythm: Normal rate and regular rhythm. Heart sounds: Normal heart sounds. Pulmonary:      Effort: Pulmonary effort is normal. No respiratory distress. Breath sounds: Normal breath sounds. No stridor. Abdominal:      General: Bowel sounds are normal. There is no distension. Palpations: Abdomen is soft. Tenderness: There is no abdominal tenderness. There is no rebound. Musculoskeletal:         General: No tenderness. Normal range of motion. Cervical back: Normal range of motion and neck supple. Comments: Multiple amputations to fingers and digits without redness or swelling. All fingers are markedly tender to palpation. Bilateral lower extremity amputations BKA but well). There is no redness or swelling although markedly tender palpation. Skin:     General: Skin is warm and dry. Capillary Refill: Capillary refill takes less than 2 seconds. Findings: No erythema or rash.    Neurological:      Mental Status: He is alert and oriented to person, place, and time. GCS: GCS eye subscore is 4. GCS verbal subscore is 5. GCS motor subscore is 6. Cranial Nerves: No cranial nerve deficit. Motor: No weakness. Psychiatric:         Attention and Perception: Attention normal.         Mood and Affect: Mood normal.         Speech: Speech normal.         Behavior: Behavior normal.         Thought Content: Thought content normal.         Cognition and Memory: Cognition and memory normal.         Judgment: Judgment normal.     Diagnostic Study Results     Labs -  No results found for this or any previous visit (from the past 24 hour(s)). Radiologic Studies -   No orders to display     CT Results  (Last 48 hours)      None          CXR Results  (Last 48 hours)      None            Medications given in the ED-  Medications   gabapentin (NEURONTIN) capsule 300 mg (300 mg Oral Given 10/21/22 0513)         Medical Decision Making   I am the first provider for this patient. I reviewed the vital signs, available nursing notes, past medical history, past surgical history, family history and social history. Vital Signs-Reviewed the patient's vital signs. Pulse Oximetry Analysis - 98% on room air      Records Reviewed: NURSING NOTES AND PREVIOUS MEDICAL RECORDS    Provider Notes (Medical Decision Making):   Patient with chronic pain does not look to have any new abrasions or injuries. Gave him gabapentin. He still not following up with his prosthetic or other appointments outpatient to daytime. Procedures:  Procedures    ED Course:   Patient presented at somewhere between 10 and 11 PM.  He often comes in the middle the night for a place to stay when it is cold outside. He was room just after 2 AM I saw him just after 5 AM.    5:11 AM: Initial assessment performed. The patients presenting problems have been discussed, and they are in agreement with the care plan formulated and outlined with them.   I have encouraged them to ask questions as they arise throughout their visit. Diagnosis and Disposition       DISCHARGE NOTE:  5:37 AM  Brayden Hays's  results have been reviewed with him. He has been counseled regarding his diagnosis, treatment, and plan. He verbally conveys understanding and agreement of the signs, symptoms, diagnosis, treatment and prognosis and additionally agrees to follow up as discussed. He also agrees with the care-plan and conveys that all of his questions have been answered. I have also provided discharge instructions for him that include: educational information regarding their diagnosis and treatment, and list of reasons why they would want to return to the ED prior to their follow-up appointment, should his condition change. He has been provided with education for proper emergency department utilization. CLINICAL IMPRESSION:    1. Chronic pain syndrome        PLAN:  1. D/C Home  2. Discharge Medication List as of 10/21/2022  5:33 AM        3. Follow-up Information       Follow up With Specialties Details Why Contact Info    Pedrito Tristan, DO Family Medicine Schedule an appointment as soon as possible for a visit   [address] [place] [address]  [phone]            _______________________________    This note was partially transcribed via voice recognition software. Although efforts have been made to catch any discrepancies, it may contain sound alike words, grammatical errors, or nonsensical words.

## 2022-10-25 ENCOUNTER — HOSPITAL ENCOUNTER (EMERGENCY)
Age: 62
Discharge: HOME | End: 2022-10-25
Attending: EMERGENCY MEDICINE
Payer: MEDICAID

## 2022-10-25 VITALS
SYSTOLIC BLOOD PRESSURE: 117 MMHG | OXYGEN SATURATION: 100 % | DIASTOLIC BLOOD PRESSURE: 84 MMHG | RESPIRATION RATE: 16 BRPM | HEART RATE: 75 BPM | TEMPERATURE: 97.7 F

## 2022-10-25 DIAGNOSIS — T14.8XXA BLISTER: Primary | ICD-10-CM

## 2022-10-25 PROCEDURE — 99283 EMERGENCY DEPT VISIT LOW MDM: CPT

## 2022-10-26 NOTE — ED PROVIDER NOTES
60year-old male past medical history of depression and anxiety and prostatic leg to the emergency department with a blister he noted today on the right side of his prosthetic leg. He has no other complaints. No drainage no fevers no chills no redness around the area. No history of similar episode reported. Patient has no other complaints patient presented by EMS for his complaint. Past Medical History:   Diagnosis Date    Anxiety     Depression     Enlarged prostate     PTSD (post-traumatic stress disorder)        Past Surgical History:   Procedure Laterality Date    HX ORTHOPAEDIC      double amputee         History reviewed. No pertinent family history. Social History     Socioeconomic History    Marital status: SINGLE     Spouse name: Not on file    Number of children: Not on file    Years of education: Not on file    Highest education level: Not on file   Occupational History    Not on file   Tobacco Use    Smoking status: Every Day    Smokeless tobacco: Not on file   Substance and Sexual Activity    Alcohol use: Yes    Drug use: Yes     Types: Marijuana, Cocaine     Comment: smokes daily    Sexual activity: Not on file   Other Topics Concern    Not on file   Social History Narrative    Not on file     Social Determinants of Health     Financial Resource Strain: Not on file   Food Insecurity: Not on file   Transportation Needs: Not on file   Physical Activity: Not on file   Stress: Not on file   Social Connections: Not on file   Intimate Partner Violence: Not on file   Housing Stability: Not on file         ALLERGIES: Mold    Review of Systems   Constitutional: Negative. Respiratory: Negative. Cardiovascular: Negative. Gastrointestinal: Negative. Neurological: Negative. All other systems reviewed and are negative.     Vitals:    10/25/22 2127   BP: 117/84   Pulse: 75   Resp: 16   Temp: 97.7 °F (36.5 °C)   SpO2: 100%            Physical Exam  Vitals and nursing note reviewed. Constitutional:       General: He is not in acute distress. Appearance: He is well-developed. He is not diaphoretic. HENT:      Head: Normocephalic and atraumatic. Eyes:      General: No scleral icterus. Right eye: No discharge. Left eye: No discharge. Conjunctiva/sclera: Conjunctivae normal.      Pupils: Pupils are equal, round, and reactive to light. Neck:      Vascular: No JVD. Cardiovascular:      Rate and Rhythm: Normal rate and regular rhythm. Pulses: Normal pulses. Heart sounds: Normal heart sounds. Pulmonary:      Effort: Pulmonary effort is normal. No respiratory distress. Breath sounds: Normal breath sounds. No stridor. No wheezing, rhonchi or rales. Chest:      Chest wall: No tenderness. Abdominal:      General: Bowel sounds are normal. There is no distension. Palpations: Abdomen is soft. There is no mass. Tenderness: There is no abdominal tenderness. There is no right CVA tenderness, left CVA tenderness, guarding or rebound. Hernia: No hernia is present. Musculoskeletal:         General: Normal range of motion. Cervical back: Normal range of motion and neck supple. Comments: Very small blister noted right stump lateral region. No signs of infection. No abscess or cellulitis noted. Skin:     General: Skin is warm and dry. Capillary Refill: Capillary refill takes less than 2 seconds. Neurological:      Mental Status: He is alert and oriented to person, place, and time.    Psychiatric:         Mood and Affect: Mood normal.        MDM         Procedures

## 2022-12-03 ENCOUNTER — HOSPITAL ENCOUNTER (EMERGENCY)
Age: 62
Discharge: HOME | End: 2022-12-04
Attending: EMERGENCY MEDICINE
Payer: MEDICAID

## 2022-12-03 VITALS
BODY MASS INDEX: 20.09 KG/M2 | SYSTOLIC BLOOD PRESSURE: 142 MMHG | TEMPERATURE: 98.1 F | HEART RATE: 85 BPM | RESPIRATION RATE: 16 BRPM | OXYGEN SATURATION: 100 % | DIASTOLIC BLOOD PRESSURE: 88 MMHG | WEIGHT: 140 LBS

## 2022-12-03 DIAGNOSIS — M25.512 ACUTE PAIN OF LEFT SHOULDER: Primary | ICD-10-CM

## 2022-12-03 DIAGNOSIS — M25.561 ARTHRALGIA OF BOTH LOWER LEGS: ICD-10-CM

## 2022-12-03 DIAGNOSIS — M25.562 ARTHRALGIA OF BOTH LOWER LEGS: ICD-10-CM

## 2022-12-03 PROCEDURE — 99283 EMERGENCY DEPT VISIT LOW MDM: CPT

## 2022-12-04 ENCOUNTER — APPOINTMENT (OUTPATIENT)
Dept: GENERAL RADIOLOGY | Age: 62
End: 2022-12-04
Attending: EMERGENCY MEDICINE
Payer: MEDICAID

## 2022-12-04 PROCEDURE — 73030 X-RAY EXAM OF SHOULDER: CPT

## 2022-12-04 PROCEDURE — 73564 X-RAY EXAM KNEE 4 OR MORE: CPT

## 2022-12-04 NOTE — ED NOTES
Received patient awake on bed , alert and oriented. Awaiting for his ride home.   [facility] ETA is 10 am.

## 2022-12-04 NOTE — ED PROVIDER NOTES
EMERGENCY DEPARTMENT HISTORY AND PHYSICAL EXAM    Date: 12/3/2022  Patient Name: Heidy Alexis    History of Presenting Illness     Chief Complaint   Patient presents with    Shoulder Pain       History Provided By: Patient     History Noah Stearns):   1:57 AM  Heidy Alexis is a 60 y.o. male with a PMHX of Anxiety, depression, PTSD, bilateral BKA  who presents to the emergency department (room 13) by [facility] C/O left shoulder pain onset today. Associated sxs include bilateral stump pain/swelling. Pt denies fevers, chills or any other sxs or complaints. Patient feels that he has left shoulder pain from pushing his wheelchair around town. He is concerned for rotator cuff injury. Chief Complaint: Left shoulder pain  Onset: Today  Timing:  Acute  Context:  [place] his wheelchair brought symptoms on, symptoms have slowly worsened since onset  Location: Left shoulder  Quality: Sharp  Severity: Mild  Modifying Factors: Nothing makes it better, or worse. Associated Symptoms:  Bilateral stump pain/swelling    PCP: None     Past History         Past Medical History:  Past Medical History:   Diagnosis Date    Anxiety     Depression     Enlarged prostate     PTSD (post-traumatic stress disorder)        Past Surgical History:  Past Surgical History:   Procedure Laterality Date    HX ORTHOPAEDIC      double amputee       Family History:  No family history on file. Reviewed and non-contributory    Social History:  Social History     Tobacco Use    Smoking status: Every Day   Substance Use Topics    Alcohol use: Yes    Drug use: Yes     Types: Marijuana, Cocaine     Comment: smokes daily       Medications:  Current Outpatient Medications   Medication Sig Dispense Refill    gabapentin (NEURONTIN) 300 mg capsule Take 1 Capsule by mouth three (3) times daily. Max Daily Amount: 900 mg. 60 Capsule 0    sertraline (Zoloft) 50 mg tablet Take 1 Tablet by mouth daily.  60 Tablet 0    acetaminophen (TYLENOL) 325 mg tablet Take 2 Tablets by mouth every four (4) hours as needed for Pain. 20 Tablet 0       Allergies: Allergies   Allergen Reactions    Mold Other (comments)     Runny nose, sneezing, itchy nose       Social Determinants of Health:  Social Determinants of Health     Tobacco Use: High Risk    Smoking Tobacco Use: Every Day    Smokeless Tobacco Use: Unknown    Passive Exposure: Not on file   Alcohol Use: Not on file   Financial Resource Strain: Not on file   Food Insecurity: Not on file   Transportation Needs: Not on file   Physical Activity: Not on file   Stress: Not on file   Social Connections: Not on file   Intimate Partner Violence: Not on file   Depression: Not on file   Housing Stability: Not on file       Review of Systems      Review of Systems   Constitutional:  Negative for chills and fever. HENT:  Negative for rhinorrhea and sore throat. Eyes:  Negative for pain and visual disturbance. Respiratory:  Negative for chest tightness, shortness of breath and wheezing. Cardiovascular:  Negative for chest pain and palpitations. Gastrointestinal:  Negative for abdominal pain, diarrhea, nausea and vomiting. Musculoskeletal:  Positive for arthralgias and myalgias. Skin:  Negative for rash and wound. Neurological:  Negative for speech difficulty, light-headedness and headaches. Psychiatric/Behavioral:  Negative for agitation and confusion. All other systems reviewed and are negative. Physical Exam     Vitals:    12/03/22 2351   BP: (!) 142/88   Pulse: 85   Resp: 16   Temp: 98.1 °F (36.7 °C)   SpO2: 100%   Weight: 63.5 kg (140 lb)       Physical Exam  Vitals and nursing note reviewed. Constitutional:       General: He is not in acute distress. Appearance: Normal appearance. He is normal weight. He is not ill-appearing. HENT:      Head: Normocephalic and atraumatic. Nose: Nose normal. No rhinorrhea.       Mouth/Throat:      Mouth: Mucous membranes are moist.      Pharynx: No oropharyngeal exudate or posterior oropharyngeal erythema. Eyes:      Extraocular Movements: Extraocular movements intact. Conjunctiva/sclera: Conjunctivae normal.      Pupils: Pupils are equal, round, and reactive to light. Cardiovascular:      Rate and Rhythm: Normal rate and regular rhythm. Heart sounds: No murmur heard. No friction rub. No gallop. Pulmonary:      Effort: Pulmonary effort is normal. No respiratory distress. Breath sounds: Normal breath sounds. No wheezing, rhonchi or rales. Abdominal:      General: Bowel sounds are normal.      Palpations: Abdomen is soft. Tenderness: There is no abdominal tenderness. There is no guarding or rebound. Musculoskeletal:         General: No swelling or deformity. Normal range of motion. Cervical back: Normal range of motion and neck supple. No rigidity. Right lower leg: Swelling and tenderness present. Left lower leg: Swelling and tenderness present. Comments: Amputations of all fingers on right hand, amputation of left hand fingers 2 through 4, mild pain and swelling to bilateral stumps below knee      Right Lower Extremity: Right leg is amputated below knee. Left Lower Extremity: Left leg is amputated below knee. Lymphadenopathy:      Cervical: No cervical adenopathy. Skin:     General: Skin is warm and dry. Findings: No rash. Neurological:      General: No focal deficit present. Mental Status: He is alert and oriented to person, place, and time. Psychiatric:         Mood and Affect: Mood normal.         Behavior: Behavior normal.       Diagnostic Study Results     Labs -  No results found for this or any previous visit (from the past 12 hour(s)). Radiologic Studies -     6:32 AM  RADIOLOGY FINDINGS  Left shoulder x-ray shows no acute fracture or dislocation.   Interpreted by Urmila Davidson MD.  Pending review by Radiologist    6:32 AM  RADIOLOGY FINDINGS  Left knee x-ray shows no acute fracture or dislocation, no soft tissue swelling, left leg BKA. Interpreted by Shahrzad Adams MD.  Pending review by Radiologist    6:32 AM  RADIOLOGY FINDINGS  Right knee x-ray shows no acute fracture or dislocation, no soft tissue swelling, right leg BKA. Interpreted by Shahrzad Adams MD.  Pending review by Radiologist    XR SHOULDER LT AP/LAT MIN 2 V    (Results Pending)   XR KNEE RT MIN 4 V    (Results Pending)   XR KNEE LT MIN 4 V    (Results Pending)     CT Results  (Last 48 hours)      None          CXR Results  (Last 48 hours)      None            Medications given in the ED-  Medications - No data to display    Procedures     Procedures    ED Course     I Shahrzad Adams MD) am the first provider for this patient. I reviewed the vital signs, available nursing notes, past medical history, past surgical history, family history and social history. Records Reviewed: Nursing Notes    Cardiac Monitor:   Rate: 85 bpm  Rhythm: sinus rhythm    Pulse Oximetry Analysis - 100% on RA    1:57 AM Initial assessment performed. The patients presenting problems have been discussed, and they are in agreement with the care plan formulated and outlined with them. I have encouraged them to ask questions as they arise throughout their visit. Medical Decision Making     Provider Notes (Medical Decision Making):   DDX: Local irritation, swelling, sprain, strain, fracture, dislocation, rotator cuff injury    Discussion:  60 y.o. male with bilateral BKA and some mild soft tissue swelling. Patient has well-healing wound on the right leg that is almost completely healed. He has left shoulder pain which does not show any acute fracture or dislocation. He may still have a rotator cuff injury but will need to follow-up as an outpatient for consideration of advanced imaging. Orthopedic follow-up recommended. He may also follow-up with his primary care doctor. Patient understands and agrees with this plan.     Diagnosis and Disposition     DISCHARGE NOTE:  6:34 AM   Brayden Hays's  results have been reviewed with him. He has been counseled regarding his diagnosis, treatment, and plan. He verbally conveys understanding and agreement of the signs, symptoms, diagnosis, treatment and prognosis and additionally agrees to follow up as discussed. He also agrees with the care-plan and conveys that all of his questions have been answered. I have also provided discharge instructions for him that include: educational information regarding their diagnosis and treatment, and list of reasons why they would want to return to the ED prior to their follow-up appointment, should his condition change. He has been provided with education for proper emergency department utilization. CLINICAL IMPRESSION:    1. Acute pain of left shoulder    2. Arthralgia of both lower legs        PLAN:  1. D/C Home  2. Current Discharge Medication List        3. Follow-up Information       Follow up With Specialties Details Why Contact Toney Acuna MD Orthopedic Surgery Schedule an appointment as soon as possible for a visit  As soon as possible, For follow up from Emergency Department visit. [address]      THE [facility] EMERGENCY DEPT Emergency Medicine  As needed; If symptoms worsen [address] [phone] Noa Ma MD am the primary clinician of record. Dragon Disclaimer     Please note that this dictation was completed with [employer], the computer voice recognition software. Quite often unanticipated grammatical, syntax, homophones, and other interpretive errors are inadvertently transcribed by the computer software. Please disregard these errors. Please excuse any errors that have escaped final proofreading.     Guillermina Ma MD

## 2022-12-04 NOTE — ED TRIAGE NOTES
Pt c/o left shoulder pain from pushing his wheelchair around town. Pt states he thinks his rotator cuff is torn.  Rom is good in arm

## 2022-12-11 ENCOUNTER — APPOINTMENT (OUTPATIENT)
Dept: GENERAL RADIOLOGY | Age: 62
End: 2022-12-11
Attending: EMERGENCY MEDICINE
Payer: MEDICAID

## 2022-12-11 ENCOUNTER — HOSPITAL ENCOUNTER (EMERGENCY)
Age: 62
Discharge: HOME | End: 2022-12-11
Attending: EMERGENCY MEDICINE
Payer: MEDICAID

## 2022-12-11 VITALS
BODY MASS INDEX: 20.09 KG/M2 | SYSTOLIC BLOOD PRESSURE: 156 MMHG | TEMPERATURE: 97 F | WEIGHT: 140 LBS | HEART RATE: 92 BPM | OXYGEN SATURATION: 99 % | DIASTOLIC BLOOD PRESSURE: 87 MMHG | RESPIRATION RATE: 18 BRPM
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
## 2022-12-11 DIAGNOSIS — S70.02XA CONTUSION OF LEFT HIP, INITIAL ENCOUNTER: ICD-10-CM

## 2022-12-11 PROCEDURE — 73502 X-RAY EXAM HIP UNI 2-3 VIEWS: CPT

## 2022-12-11 PROCEDURE — 74011250637 HC RX REV CODE- 250/637: Performed by: EMERGENCY MEDICINE

## 2022-12-11 PROCEDURE — 99283 EMERGENCY DEPT VISIT LOW MDM: CPT

## 2022-12-11 RX ORDER — IBUPROFEN 400 MG/1
400 TABLET ORAL
Status: COMPLETED | OUTPATIENT
Start: 2022-12-11 | End: 2022-12-11

## 2022-12-11 RX ORDER — ACETAMINOPHEN 325 MG/1
650 TABLET ORAL
Status: COMPLETED | OUTPATIENT
Start: 2022-12-11 | End: 2022-12-11

## 2022-12-11 RX ADMIN — IBUPROFEN 400 MG: 400 TABLET, FILM COATED ORAL at 02:44

## 2022-12-11 RX ADMIN — ACETAMINOPHEN 650 MG: 325 TABLET ORAL at 02:44

## 2022-12-11 NOTE — DISCHARGE INSTRUCTIONS
Your x-ray does not show any sign of a fracture. This is likely just a bruise or a contusion. Follow-up with your primary care doctor for recheck of your symptoms and return to the emergency room if worse.

## 2022-12-11 NOTE — ED PROVIDER NOTES
EMERGENCY DEPARTMENT HISTORY AND PHYSICAL EXAM      Date: 12/11/2022  Patient Name: Rhiannon Angelo      History of Presenting Illness     Chief Complaint   Patient presents with    Fall       History Provided By: Patient  Location/Duration/Severity/Modifying factors   Is a 54-year-old gentleman who presents with a chief complaint of a fall from wheelchair. He states that is happened a couple days ago he was in a wheelchair he fell backwards and he evidently landed on his left buttock. Complains of persistent left buttock pain since the fall. No chest pain no neck pain back pain and there is no head trauma. Patient states that this is his only complaint. He states he has been following up with a new PCP through [facility]. Evidently his  who drove him to the ER and mentioned need for psychiatric evaluation although the patient states he feels fine. He declines needing any sort of psychiatric care today. Denies any SI, HI or any auditory or visual hallucinations. Rates the pain is moderate in the left buttock region. Worsens when he shift positions in his chair. There are no other complaints, changes, or physical findings at this time. PCP: None    Current Outpatient Medications   Medication Sig Dispense Refill    gabapentin (NEURONTIN) 300 mg capsule Take 1 Capsule by mouth three (3) times daily. Max Daily Amount: 900 mg. 60 Capsule 0    sertraline (Zoloft) 50 mg tablet Take 1 Tablet by mouth daily. 60 Tablet 0    acetaminophen (TYLENOL) 325 mg tablet Take 2 Tablets by mouth every four (4) hours as needed for Pain. 20 Tablet 0       Past History     Past Medical History:  Past Medical History:   Diagnosis Date    Anxiety     Depression     Enlarged prostate     PTSD (post-traumatic stress disorder)        Past Surgical History:  Past Surgical History:   Procedure Laterality Date    HX ORTHOPAEDIC      double amputee       Family History:  History reviewed.  No pertinent family history. Social History:  Social History     Tobacco Use    Smoking status: Every Day   Substance Use Topics    Alcohol use: Yes    Drug use: Yes     Types: Marijuana, Cocaine     Comment: smokes daily       Allergies: Allergies   Allergen Reactions    Mold Other (comments)     Runny nose, sneezing, itchy nose         Review of Systems     Review of Systems   Constitutional:  Negative for fever. HENT:  Negative for congestion and rhinorrhea. Eyes:  Negative for visual disturbance. Respiratory:  Negative for cough and shortness of breath. Cardiovascular:  Negative for chest pain. Gastrointestinal:  Negative for abdominal pain, diarrhea, nausea and vomiting. Genitourinary:  Negative for urgency. Musculoskeletal:  Positive for arthralgias. Negative for myalgias. Skin:  Negative for rash. Neurological:  Negative for headaches. Psychiatric/Behavioral:  Negative for behavioral problems, confusion, self-injury and suicidal ideas. The patient is not nervous/anxious. Physical Exam     Physical Exam  Constitutional:       General: He is not in acute distress. Appearance: Normal appearance. He is normal weight. He is not ill-appearing or toxic-appearing. HENT:      Head: Normocephalic and atraumatic. Right Ear: External ear normal.      Left Ear: External ear normal.      Nose: Nose normal.      Mouth/Throat:      Mouth: Mucous membranes are moist.      Pharynx: No oropharyngeal exudate or posterior oropharyngeal erythema. Eyes:      Conjunctiva/sclera: Conjunctivae normal.      Pupils: Pupils are equal, round, and reactive to light. Cardiovascular:      Rate and Rhythm: Normal rate and regular rhythm. Pulses: Normal pulses. Heart sounds: Normal heart sounds. No murmur heard. No friction rub. Pulmonary:      Effort: Pulmonary effort is normal.      Breath sounds: Normal breath sounds. No wheezing, rhonchi or rales. Abdominal:      General: Abdomen is flat. Tenderness: There is no abdominal tenderness. There is no guarding or rebound. Musculoskeletal:         General: No swelling or tenderness. Normal range of motion. Cervical back: Normal range of motion and neck supple. Comments: Patient has a bilateral BKA. Amputation of parts of multiple fingers. There is mild tenderness to the left ischial tuberosity. .  There is no midline spinal tenderness from cervical to sacral spine. Skin:     General: Skin is warm and dry. Capillary Refill: Capillary refill takes less than 2 seconds. Findings: No rash. Neurological:      General: No focal deficit present. Mental Status: He is alert. Motor: No weakness. Lab and Diagnostic Study Results     Labs -  No results found for this or any previous visit (from the past 24 hour(s)). Radiologic Studies -   XR HIP LT W OR WO PELV 2-3 VWS   Final Result      No acute findings. _______________                              Medical Decision Making and ED Course   - I am the first and primary provider for this patient AND AM THE PRIMARY PROVIDER OF RECORD. - I reviewed the vital signs, available nursing notes, past medical history, past surgical history, family history and social history. - Initial assessment performed. The patients presenting problems have been discussed, and the staff are in agreement with the care plan formulated and outlined with them. I have encouraged them to ask questions as they arise throughout their visit. Vital Signs-Reviewed the patient's vital signs. Patient Vitals for the past 24 hrs:   Temp Pulse Resp BP SpO2   12/11/22 0137 97 °F (36.1 °C) 92 18 (!) 156/87 99 %     XR of Left Hip:  XR HIP LT W OR WO PELV 2-3 VWS   Final Result      No acute findings.        _______________                              Nursing notes and pertinent past medical history including imaging and labs have been reviewed (if available)    ED Course:     ED Course as of 12/11/22 1907   Sun Dec 11, 2022   8078 X-ray of the left hip and pelvis on my review shows no fracture. Formal read is pending however. [KANE]      ED Course User Index  [KANE] Kala Jamison DO       Provider Notes (Medical Decision Making):     MDM  Number of Diagnoses or Management Options  Contusion of left hip, initial encounter  Fall, initial encounter  Diagnosis management comments: Patient presents with a ground-level fall. Patient declines any sort of psychiatric evaluation although this was mentioned by the patient's  who brought him to the ER. Patient is now under any sort of court order and he is responding appropriately is able to provide very appropriate answers he is very optimistic although not frankly manic. We will do an x-ray of his left hip given his complaint of hip pain after a fall. DDX: Sprain, strain, soft tissue injury, contusion, fracture, dislocation, etc.    Negative x-ray will discharge home         _________________________________________________________________    At this time, patient is stable and appropriate for discharge home. Patient demonstrates understanding of current diagnoses and is in agreement with the treatment plan. They are advised that while the likelihood of serious underlying condition is low at this point given the evaluation performed today, we cannot fully rule it out. They are advised to immediately return with any new symptoms or worsening of current condition. Any Incidental findings were noted on the patient's discharge paperwork as well as verbally directly to the patient, and the appropriate follow-up was given to the patient as far as instructions on testing needed as well as the timeframe. All questions have been answered. Patient is given educational material regarding their diagnoses, including danger symptoms and when to return to the ED. This note was dictated utilizing Dragon voice recognition software.  Unfortunately this leads to occasional typographical errors. I apologize in advance if the situation occurs. If questions occur please do not hesitate to contact me directly. Ramila Jones DO          Procedures and Critical Care   Performed by: Ramila Jones DO  Procedures         Ramila Jones DO      Diagnosis:   1. Fall, initial encounter    2. Contusion of left hip, initial encounter          Disposition: Discharge    Follow-up Information       Follow up With Specialties Details Why [address]    [facility] Emergency Medicine  As needed, If symptoms worsen; or [facility] Emergency Department [address]  [phone]    Your Primary Care Physician  In 3 days              Discharge Medication List as of 12/11/2022  4:09 AM        CONTINUE these medications which have NOT CHANGED    Details   gabapentin (NEURONTIN) 300 mg capsule Take 1 Capsule by mouth three (3) times daily. Max Daily Amount: 900 mg., Normal, Disp-60 Capsule, R-0      sertraline (Zoloft) 50 mg tablet Take 1 Tablet by mouth daily. , Normal, Disp-60 Tablet, R-0      acetaminophen (TYLENOL) 325 mg tablet Take 2 Tablets by mouth every four (4) hours as needed for Pain., Normal, Disp-20 Tablet, R-0             Patient seen in the context of the Novel Coronavirus (COVID19) pandemic, utilizing contemporary protocols and evidence based on the most up to date available evidence, understanding that the current evidence has the potential to change as additional information becomes available. This note is dictated utilizing Dragon voice recognition software. Unfortunately this leads to occasional typographical errors using the voice recognition. I apologize in advance if the situation occurs. If questions occur please do not hesitate to contact me directly.     Ramila Jones DO

## 2022-12-11 NOTE — ED TRIAGE NOTES
Patient to ED for chief complaint of fall and injury to buttocks. Patient denies striking head or other injury.  After triage, patient's friend asking this nurse if patient can have psychiatric screening

## 2023-01-23 PROCEDURE — 99283 EMERGENCY DEPT VISIT LOW MDM: CPT

## 2023-01-24 ENCOUNTER — HOSPITAL ENCOUNTER (EMERGENCY)
Age: 63
Discharge: HOME | End: 2023-01-24
Attending: EMERGENCY MEDICINE
Payer: MEDICAID

## 2023-01-24 VITALS
TEMPERATURE: 98.2 F | HEART RATE: 82 BPM | RESPIRATION RATE: 15 BRPM | BODY MASS INDEX: 20.09 KG/M2 | WEIGHT: 140 LBS | DIASTOLIC BLOOD PRESSURE: 81 MMHG | SYSTOLIC BLOOD PRESSURE: 111 MMHG | OXYGEN SATURATION: 99 %

## 2023-01-24 DIAGNOSIS — Z76.0 ENCOUNTER FOR MEDICATION REFILL: ICD-10-CM

## 2023-01-24 DIAGNOSIS — G54.6 PHANTOM LIMB SYNDROME WITH PAIN (HCC): ICD-10-CM
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
## 2023-01-24 PROCEDURE — 74011250637 HC RX REV CODE- 250/637: Performed by: EMERGENCY MEDICINE

## 2023-01-24 RX ORDER — GABAPENTIN 300 MG/1
300 CAPSULE ORAL 3 TIMES DAILY
Qty: 15 CAPSULE | Refills: 0 | Status: SHIPPED | OUTPATIENT
Start: 2023-01-24 | End: 2023-01-29

## 2023-01-24 RX ORDER — GABAPENTIN 300 MG/1
300 CAPSULE ORAL ONCE
Status: COMPLETED | OUTPATIENT
Start: 2023-01-24 | End: 2023-01-24

## 2023-01-24 RX ADMIN — GABAPENTIN 300 MG: 300 CAPSULE ORAL at 02:03

## 2023-01-24 NOTE — DISCHARGE INSTRUCTIONS
Follow up with the [facility] and one of the attached resources for primary care and urology as discussed. I have refilled your Gabapentin for a few days. For further refills of this or reinitiating your  psychiatric medicine, contact your PCP or [facility].

## 2023-01-24 NOTE — ED TRIAGE NOTES
Pt reports falling out a his wheelchair during the daytime. Pt reports pain in his right shoulder and right knee cap.
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
## 2023-04-30 ENCOUNTER — HOSPITAL ENCOUNTER (EMERGENCY)
Facility: HOSPITAL | Age: 63
Discharge: HOME | End: 2023-05-01
Attending: EMERGENCY MEDICINE
Payer: MEDICAID

## 2023-04-30 VITALS
HEART RATE: 89 BPM | BODY MASS INDEX: 22.19 KG/M2 | OXYGEN SATURATION: 98 % | SYSTOLIC BLOOD PRESSURE: 111 MMHG | TEMPERATURE: 97.1 F | DIASTOLIC BLOOD PRESSURE: 80 MMHG | WEIGHT: 155 LBS | HEIGHT: 70 IN | RESPIRATION RATE: 18 BRPM

## 2023-04-30 DIAGNOSIS — S00.03XA HEMATOMA OF SCALP, INITIAL ENCOUNTER: ICD-10-CM

## 2023-04-30 DIAGNOSIS — Z59.00 HOMELESSNESS: ICD-10-CM

## 2023-04-30 DIAGNOSIS — M79.10 MYALGIA: Primary | ICD-10-CM

## 2023-04-30 LAB
ALBUMIN SERPL-MCNC: 3.2 G/DL (ref 3.4–5)
ALBUMIN/GLOB SERPL: 1 (ref 0.8–1.7)
ALP SERPL-CCNC: 103 U/L (ref 45–117)
ALT SERPL-CCNC: 25 U/L (ref 16–61)
AMMONIA PLAS-SCNC: 18 UMOL/L (ref 11–32)
ANION GAP SERPL CALC-SCNC: 5 MMOL/L (ref 3–18)
AST SERPL-CCNC: 24 U/L (ref 10–38)
BASOPHILS # BLD: 0.1 K/UL (ref 0–0.1)
BASOPHILS NFR BLD: 1 % (ref 0–2)
BILIRUB SERPL-MCNC: 0.5 MG/DL (ref 0.2–1)
BUN SERPL-MCNC: 20 MG/DL (ref 7–18)
BUN/CREAT SERPL: 33 (ref 12–20)
CALCIUM SERPL-MCNC: 8.8 MG/DL (ref 8.5–10.1)
CHLORIDE SERPL-SCNC: 110 MMOL/L (ref 100–111)
CK SERPL-CCNC: 206 U/L (ref 39–308)
CO2 SERPL-SCNC: 25 MMOL/L (ref 21–32)
CREAT SERPL-MCNC: 0.61 MG/DL (ref 0.6–1.3)
DIFFERENTIAL METHOD BLD: ABNORMAL
EOSINOPHIL # BLD: 1.1 K/UL (ref 0–0.4)
EOSINOPHIL NFR BLD: 16 % (ref 0–5)
ERYTHROCYTE [DISTWIDTH] IN BLOOD BY AUTOMATED COUNT: 13.1 % (ref 11.6–14.5)
ETHANOL SERPL-MCNC: 64 MG/DL (ref 0–3)
GLOBULIN SER CALC-MCNC: 3.2 G/DL (ref 2–4)
GLUCOSE SERPL-MCNC: 87 MG/DL (ref 74–99)
HCT VFR BLD AUTO: 41.1 % (ref 36–48)
HGB BLD-MCNC: 14 G/DL (ref 13–16)
IMM GRANULOCYTES # BLD AUTO: 0 K/UL (ref 0–0.04)
IMM GRANULOCYTES NFR BLD AUTO: 0 % (ref 0–0.5)
LYMPHOCYTES # BLD: 1.8 K/UL (ref 0.9–3.6)
LYMPHOCYTES NFR BLD: 26 % (ref 21–52)
MCH RBC QN AUTO: 32.7 PG (ref 24–34)
MCHC RBC AUTO-ENTMCNC: 34.1 G/DL (ref 31–37)
MCV RBC AUTO: 96 FL (ref 78–100)
MONOCYTES # BLD: 0.7 K/UL (ref 0.05–1.2)
MONOCYTES NFR BLD: 10 % (ref 3–10)
NEUTS SEG # BLD: 3.4 K/UL (ref 1.8–8)
NEUTS SEG NFR BLD: 47 % (ref 40–73)
NRBC # BLD: 0 K/UL (ref 0–0.01)
NRBC BLD-RTO: 0 PER 100 WBC
PLATELET # BLD AUTO: 348 K/UL (ref 135–420)
PMV BLD AUTO: 9.3 FL (ref 9.2–11.8)
POTASSIUM SERPL-SCNC: 4 MMOL/L (ref 3.5–5.5)
PROT SERPL-MCNC: 6.4 G/DL (ref 6.4–8.2)
RBC # BLD AUTO: 4.28 M/UL (ref 4.35–5.65)
RBC MORPH BLD: ABNORMAL
SODIUM SERPL-SCNC: 140 MMOL/L (ref 136–145)
WBC # BLD AUTO: 7.1 K/UL (ref 4.6–13.2)

## 2023-04-30 PROCEDURE — 82140 ASSAY OF AMMONIA: CPT

## 2023-04-30 PROCEDURE — 82077 ASSAY SPEC XCP UR&BREATH IA: CPT

## 2023-04-30 PROCEDURE — 99284 EMERGENCY DEPT VISIT MOD MDM: CPT

## 2023-04-30 PROCEDURE — 80053 COMPREHEN METABOLIC PANEL: CPT

## 2023-04-30 PROCEDURE — 85025 COMPLETE CBC W/AUTO DIFF WBC: CPT

## 2023-04-30 PROCEDURE — 82550 ASSAY OF CK (CPK): CPT

## 2023-04-30 SDOH — ECONOMIC STABILITY - HOUSING INSECURITY: HOMELESSNESS UNSPECIFIED: Z59.00

## 2023-04-30 ASSESSMENT — PAIN SCALES - GENERAL: PAINLEVEL_OUTOF10: 10

## 2023-05-01 ENCOUNTER — APPOINTMENT (OUTPATIENT)
Facility: HOSPITAL | Age: 63
End: 2023-05-01
Payer: MEDICAID

## 2023-05-01 LAB
AMPHET UR QL SCN: NEGATIVE
APPEARANCE UR: CLEAR
BARBITURATES UR QL SCN: NEGATIVE
BENZODIAZ UR QL: NEGATIVE
BILIRUB UR QL: NEGATIVE
CANNABINOIDS UR QL SCN: POSITIVE
COCAINE UR QL SCN: NEGATIVE
COLOR UR: YELLOW
GLUCOSE UR STRIP.AUTO-MCNC: NEGATIVE MG/DL
HGB UR QL STRIP: NEGATIVE
KETONES UR QL STRIP.AUTO: NEGATIVE MG/DL
LEUKOCYTE ESTERASE UR QL STRIP.AUTO: NEGATIVE
Lab: ABNORMAL
METHADONE UR QL: NEGATIVE
NITRITE UR QL STRIP.AUTO: NEGATIVE
OPIATES UR QL: NEGATIVE
PCP UR QL: NEGATIVE
PH UR STRIP: 5 (ref 5–8)
PROT UR STRIP-MCNC: NEGATIVE MG/DL
SP GR UR REFRACTOMETRY: 1.02 (ref 1–1.03)
UROBILINOGEN UR QL STRIP.AUTO: 1 EU/DL (ref 0.2–1)

## 2023-05-01 PROCEDURE — 80307 DRUG TEST PRSMV CHEM ANLYZR: CPT

## 2023-05-01 PROCEDURE — 70450 CT HEAD/BRAIN W/O DYE: CPT

## 2023-05-01 PROCEDURE — 81003 URINALYSIS AUTO W/O SCOPE: CPT

## 2023-05-01 NOTE — DISCHARGE INSTRUCTIONS
Thank you for allowing us to provide you with excellent care today. We hope we addressed all of your concerns and needs. We strive to provide excellent quality care in the Emergency Department. Anything less than excellent does not meet our expectations for you. Incidental findings are findings on labs or imaging studies that do not necessarily correlate with the reason for your visit. We make every effort to inform you of these incidental findings and the proper follow-up, however it is important that you call your primary care doctor or a primary care doctor in 1 to 2 days to set up a follow-up visit so they can go through your results in detail with you, and determine if additional testing is needed. The emergency room is not intended to be complete or comprehensive care, and it serves as a means to rule out life-threatening pathologies. It is very important that you follow-up with your primary care doctor to arrange additional testing and/or treatment if needed. The exam and treatment you received in the Emergency Department were for an urgent problem and are not intended as complete care. It is important that you follow-up with a doctor, nurse practitioner, or physician assistant to:  (1) confirm your diagnosis,  (2) re-evaluation of changes in your illness and treatment, and  (3) for ongoing care. If your symptoms become worse or you do not improve as expected, or you develop any new symptoms that concern you and/or you are unable to reach your usual health care provider, you should return to the Emergency Department. We are available 24 hours a day. If your blood pressure is greater than 120/80, your blood pressure is considered elevated above normal and you need to follow up with your primary care physician or the physician provided on your discharge instructions for a blood pressure recheck.      If you were prescribed narcotic medications such as hydrocodone, oxycodone, diazepam,
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
## 2023-05-01 NOTE — ED NOTES
Pt educated on staying in bed; bed rails up; call light in reach.       Mackey Aase, RN  04/30/23 5488

## 2023-05-02 NOTE — PROGRESS NOTES
Emergency Department handoff note    5/2/2023  8:51 AM         Patient endorsed to me at shift change by off going provider, Reji Navarro PA-C    Presents with a multitude of symptoms, EMS initially called because they stated the patient had fallen however patient states he is not sure if he fell, he tells me he possibly was bit by a tick but does not know where. He says he does not have any marks on his skin from a tick. He is a bit more drowsy than his baseline from prior encounters however he does endorse drinking alcohol. Given the possibility of fall and his drowsiness we will perform head CT. ED Course as of 05/02/23 0851   Mon May 01, 2023   0101 1:01 AM  Patient resting comfortably, he is more awake and alert, requests urinal. Room smells of marijuana. Following commands. [EC]   0108 1:08 AM  Care transferred to Dr. Josselyn Fallon pending UA and head CT. Likely discharge home. [EC]   0355 CT negative. Will discharge home [DANO]      ED Course User Index  [EC] Lucita Abraham PA-C  [DANO] Christel Adamson DO         No results found for this or any previous visit (from the past 12 hour(s)). CT HEAD WO CONTRAST   Final Result   No acute intracranial findings. Small right occipital scalp swelling   and hematoma. Diagnosis:   1. Myalgia    2. Hematoma of scalp, initial encounter    3. Homelessness          PATIENT REFERRED TO:  THE [facility] EMERGENCY DEPT  [address] [place] [id number] [phone]    As needed, If symptoms worsen    Your Primary Care Physician  Call your primary care/family medicine provider to set up a follow-up visit to discuss the care in the emergency room today.   Call         DISCHARGE MEDICATIONS:  Discharge Medication List as of 5/1/2023  4:01 AM          Christel Adamson DO

## 2023-06-06 ENCOUNTER — HOSPITAL ENCOUNTER (EMERGENCY)
Facility: HOSPITAL | Age: 63
Discharge: HOME | End: 2023-06-07
Attending: EMERGENCY MEDICINE
Payer: MEDICAID

## 2023-06-06 VITALS
DIASTOLIC BLOOD PRESSURE: 66 MMHG | HEART RATE: 107 BPM | TEMPERATURE: 97.7 F | OXYGEN SATURATION: 95 % | RESPIRATION RATE: 18 BRPM | SYSTOLIC BLOOD PRESSURE: 97 MMHG

## 2023-06-06 DIAGNOSIS — R29.6 FREQUENT FALLS: Primary | ICD-10-CM

## 2023-06-06 PROCEDURE — 99283 EMERGENCY DEPT VISIT LOW MDM: CPT

## 2023-06-07 PROCEDURE — 6370000000 HC RX 637 (ALT 250 FOR IP): Performed by: EMERGENCY MEDICINE

## 2023-06-07 RX ORDER — ACETAMINOPHEN 500 MG
1000 TABLET ORAL
Status: COMPLETED | OUTPATIENT
Start: 2023-06-07 | End: 2023-06-07

## 2023-06-07 RX ORDER — LIDOCAINE 50 MG/G
1 PATCH TOPICAL DAILY
Qty: 10 PATCH | Refills: 0 | Status: SHIPPED | OUTPATIENT
Start: 2023-06-07 | End: 2023-06-17

## 2023-06-07 RX ADMIN — ACETAMINOPHEN 1000 MG: 500 TABLET ORAL at 00:28

## 2023-06-07 NOTE — ED PROVIDER NOTES
[facility] EMERGENCY DEPT  EMERGENCY DEPARTMENT ENCOUNTER      Pt Name: Kevin Mejia  MRN: [id number]  [place] 1960  Date of evaluation: 6/6/2023  Provider: Betito Pedroza [address] [place] [address] Me[place]       Chief Complaint   Patient presents with    Fall         HISTORY OF PRESENT ILLNESS   (Location/Symptom, Timing/Onset, Context/Setting, Quality, Duration, Modifying Factors, Severity)  Note limiting factors. Kevin Mejia is a 60 y.o. male who presents to the emergency department complaint of falling. The patient has frequent falls. His bilateral stumps and multiple fingers amputations of the distal phalanges. Patient is complaining of persistent pain in his left arm. He was seen at [facility] for this same thing 2 days ago. Has a prescription available for him for lidocaine and gabapentin at [address] [place] [address]. He does not have a primary care provider. He does not have an identification card anymore because he lost his wallet. He says his primary home health provider used to, for 36 hours a week at home but she is moved away to [place]. HPI    Nursing Notes were reviewed. REVIEW OF SYSTEMS    (2-9 systems for level 4, 10 or more for level 5)     Review of Systems   Musculoskeletal:  Positive for arthralgias and gait problem. Neurological:  Negative for weakness and numbness. Except as noted above the remainder of the review of systems was reviewed and negative. PAST MEDICAL HISTORY     Past Medical History:   Diagnosis Date    Anxiety     Depression     Enlarged prostate     PTSD (post-traumatic stress disorder)          SURGICAL HISTORY       Past Surgical History:   Procedure Laterality Date    ORTHOPEDIC SURGERY      double amputee         CURRENT MEDICATIONS       Previous Medications    ACETAMINOPHEN (TYLENOL) 325 MG TABLET    Take 650 mg by mouth every 4 hours as needed    GABAPENTIN (NEURONTIN) 300 MG CAPSULE    Take 300 mg by mouth 3 times daily.     SERTRALINE (ZOLOFT) 50

## 2023-06-07 NOTE — ED TRIAGE NOTES
Pt states that he has nasal congestion that has been ongoing for several days. Pt seen at [facility] and was given RX that he did not fill. Pt states he has fallen and has bilateral stump pain, left arm pain and left 5th digit pain.  Pt wants rx for Flonase, referral for PCP and urologist

## 2023-08-13 ENCOUNTER — HOSPITAL ENCOUNTER (EMERGENCY)
Facility: HOSPITAL | Age: 63
Discharge: HOME | End: 2023-08-13
Attending: EMERGENCY MEDICINE
Payer: MEDICAID

## 2023-08-13 ENCOUNTER — APPOINTMENT (OUTPATIENT)
Facility: HOSPITAL | Age: 63
End: 2023-08-13
Payer: MEDICAID

## 2023-08-13 VITALS
WEIGHT: 152.9 LBS | TEMPERATURE: 98 F | DIASTOLIC BLOOD PRESSURE: 80 MMHG | RESPIRATION RATE: 18 BRPM | SYSTOLIC BLOOD PRESSURE: 122 MMHG | HEIGHT: 70 IN | BODY MASS INDEX: 21.89 KG/M2 | OXYGEN SATURATION: 96 % | HEART RATE: 89 BPM
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
## 2023-08-13 PROCEDURE — 72125 CT NECK SPINE W/O DYE: CPT

## 2023-08-13 PROCEDURE — 70450 CT HEAD/BRAIN W/O DYE: CPT

## 2023-08-13 PROCEDURE — 73030 X-RAY EXAM OF SHOULDER: CPT

## 2023-08-13 PROCEDURE — 99284 EMERGENCY DEPT VISIT MOD MDM: CPT
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
## 2023-08-13 NOTE — ED PROVIDER NOTES
[facility] EMERGENCY DEPT  EMERGENCY DEPARTMENT ENCOUNTER    Patient Name: Raven Levy  MRN: [id number]  YOB: 1960  Provider: Celso Fonseca MD  PCP: None None   Time/Date of evaluation: 2:23 AM EDT on 8/13/23    History of Presenting Illness     History Provided by: Patient  History is limited by: Nothing     HISTORY:   Raven Levy is a 58 y.o. male presenting with bilateral shoulder pain and head pain. He says he hit his head on the pavement the other day. He also landed on his right shoulder. He is also complains of chronic left shoulder pain. He is a very vague historian and is unable or unwilling to clarify symptoms. It sounds as though he has had multiple traumas to his head and shoulders. Review of the chart shows that he has had multiple emergency room visits for very similar complaints. Nursing Notes were all reviewed and agreed with or any disagreements were addressed in the HPI. Past History     PAST MEDICAL HISTORY:  Past Medical History:   Diagnosis Date    Anxiety     Depression     Enlarged prostate     PTSD (post-traumatic stress disorder)        PAST SURGICAL HISTORY:  Past Surgical History:   Procedure Laterality Date    ORTHOPEDIC SURGERY      double amputee       FAMILY HISTORY:  History reviewed. No pertinent family history. SOCIAL HISTORY:  Social History     Tobacco Use    Smoking status: Every Day   Substance Use Topics    Alcohol use: Yes    Drug use: Yes     Types: Cocaine, Marijuana Dorcus Sjogren)       MEDICATIONS:  No current facility-administered medications for this encounter. Current Outpatient Medications   Medication Sig Dispense Refill    acetaminophen (TYLENOL) 325 MG tablet Take 650 mg by mouth every 4 hours as needed      gabapentin (NEURONTIN) 300 MG capsule Take 300 mg by mouth 3 times daily.       sertraline (ZOLOFT) 50 MG tablet Take 50 mg by mouth daily         ALLERGIES:  Allergies   Allergen Reactions    Molds & Smuts Other (See

## 2024-03-28 ENCOUNTER — HOSPITAL ENCOUNTER (EMERGENCY)
Facility: HOSPITAL | Age: 64
Discharge: HOME | End: 2024-03-29
Attending: EMERGENCY MEDICINE
Payer: MEDICAID

## 2024-03-28 DIAGNOSIS — S40.012A CONTUSION OF LEFT SHOULDER, INITIAL ENCOUNTER: Primary | ICD-10-CM

## 2024-03-28 DIAGNOSIS — S20.212A CONTUSION OF LEFT CHEST WALL, INITIAL ENCOUNTER: ICD-10-CM

## 2024-03-28 PROCEDURE — 99283 EMERGENCY DEPT VISIT LOW MDM: CPT

## 2024-03-29 ENCOUNTER — APPOINTMENT (OUTPATIENT)
Facility: HOSPITAL | Age: 64
End: 2024-03-29
Payer: MEDICAID

## 2024-03-29 VITALS
SYSTOLIC BLOOD PRESSURE: 130 MMHG | DIASTOLIC BLOOD PRESSURE: 80 MMHG | RESPIRATION RATE: 18 BRPM | OXYGEN SATURATION: 97 % | TEMPERATURE: 97.7 F | HEART RATE: 103 BPM

## 2024-03-29 PROCEDURE — 73030 X-RAY EXAM OF SHOULDER: CPT

## 2024-03-29 PROCEDURE — 6370000000 HC RX 637 (ALT 250 FOR IP): Performed by: EMERGENCY MEDICINE

## 2024-03-29 PROCEDURE — 71101 X-RAY EXAM UNILAT RIBS/CHEST: CPT

## 2024-03-29 RX ORDER — LIDOCAINE 4 G/G
1 PATCH TOPICAL
Status: DISCONTINUED | OUTPATIENT
Start: 2024-03-29 | End: 2024-03-29 | Stop reason: HOSPADM

## 2024-03-29 RX ORDER — LIDOCAINE 4 G/G
1 PATCH TOPICAL DAILY
Status: DISCONTINUED | OUTPATIENT
Start: 2024-03-29 | End: 2024-03-29

## 2024-03-29 RX ORDER — OXYCODONE HYDROCHLORIDE AND ACETAMINOPHEN 5; 325 MG/1; MG/1
1 TABLET ORAL
Status: COMPLETED | OUTPATIENT
Start: 2024-03-29 | End: 2024-03-29

## 2024-03-29 RX ORDER — METHOCARBAMOL 750 MG/1
750 TABLET, FILM COATED ORAL 3 TIMES DAILY PRN
Qty: 30 TABLET | Refills: 0 | Status: SHIPPED | OUTPATIENT
Start: 2024-03-29 | End: 2024-03-29

## 2024-03-29 RX ORDER — METHOCARBAMOL 750 MG/1
750 TABLET, FILM COATED ORAL 3 TIMES DAILY PRN
Qty: 30 TABLET | Refills: 0 | Status: SHIPPED | OUTPATIENT
Start: 2024-03-29 | End: 2024-04-08

## 2024-03-29 RX ORDER — LIDOCAINE 50 MG/G
1 PATCH TOPICAL DAILY
Qty: 30 PATCH | Refills: 0 | Status: SHIPPED | OUTPATIENT
Start: 2024-03-29

## 2024-03-29 RX ADMIN — OXYCODONE HYDROCHLORIDE AND ACETAMINOPHEN 1 TABLET: 5; 325 TABLET ORAL at 01:15

## 2024-03-29 ASSESSMENT — PAIN SCALES - GENERAL: PAINLEVEL_OUTOF10: 10

## 2024-03-29 NOTE — ED NOTES
Pt currently sleeping quietly in no distress, respirations even and unlabored. Warm blanket provided. No additional needs at this time.
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
## 2024-04-03 ENCOUNTER — HOSPITAL ENCOUNTER (EMERGENCY)
Facility: HOSPITAL | Age: 64
Discharge: HOME | End: 2024-04-04
Attending: EMERGENCY MEDICINE
Payer: MEDICAID

## 2024-04-03 VITALS
HEART RATE: 86 BPM | WEIGHT: 160 LBS | TEMPERATURE: 97.6 F | OXYGEN SATURATION: 96 % | BODY MASS INDEX: 22.96 KG/M2 | SYSTOLIC BLOOD PRESSURE: 113 MMHG | RESPIRATION RATE: 19 BRPM | DIASTOLIC BLOOD PRESSURE: 75 MMHG

## 2024-04-03 DIAGNOSIS — Z89.519 BELOW-KNEE AMPUTEE (HCC): ICD-10-CM

## 2024-04-03 DIAGNOSIS — G89.4 CHRONIC PAIN SYNDROME: ICD-10-CM

## 2024-04-03 DIAGNOSIS — R29.6 FREQUENT FALLS: Primary | ICD-10-CM

## 2024-04-03 PROCEDURE — 99283 EMERGENCY DEPT VISIT LOW MDM: CPT

## 2024-04-04 LAB
APPEARANCE UR: CLEAR
BACTERIA URNS QL MICRO: ABNORMAL /HPF
BILIRUB UR QL: NEGATIVE
COLOR UR: YELLOW
EPITH CASTS URNS QL MICRO: NEGATIVE /LPF (ref 0–5)
GLUCOSE UR STRIP.AUTO-MCNC: NEGATIVE MG/DL
HGB UR QL STRIP: NEGATIVE
KETONES UR QL STRIP.AUTO: ABNORMAL MG/DL
LEUKOCYTE ESTERASE UR QL STRIP.AUTO: ABNORMAL
MUCOUS THREADS URNS QL MICRO: ABNORMAL /LPF
NITRITE UR QL STRIP.AUTO: NEGATIVE
PH UR STRIP: 5.5 (ref 5–8)
PROT UR STRIP-MCNC: NEGATIVE MG/DL
RBC #/AREA URNS HPF: ABNORMAL /HPF (ref 0–5)
SP GR UR REFRACTOMETRY: 1.02 (ref 1–1.03)
UROBILINOGEN UR QL STRIP.AUTO: 1 EU/DL (ref 0.2–1)
WBC URNS QL MICRO: ABNORMAL /HPF (ref 0–5)

## 2024-04-04 PROCEDURE — 81001 URINALYSIS AUTO W/SCOPE: CPT

## 2024-04-04 PROCEDURE — 6370000000 HC RX 637 (ALT 250 FOR IP): Performed by: EMERGENCY MEDICINE

## 2024-04-04 RX ORDER — METHOCARBAMOL 500 MG/1
1500 TABLET, FILM COATED ORAL
Status: COMPLETED | OUTPATIENT
Start: 2024-04-04 | End: 2024-04-04

## 2024-04-04 RX ORDER — ACETAMINOPHEN 325 MG/1
650 TABLET ORAL
Status: COMPLETED | OUTPATIENT
Start: 2024-04-04 | End: 2024-04-04

## 2024-04-04 RX ORDER — GINSENG 100 MG
CAPSULE ORAL
Status: COMPLETED | OUTPATIENT
Start: 2024-04-04 | End: 2024-04-04

## 2024-04-04 RX ADMIN — BACITRACIN: 500 OINTMENT TOPICAL at 01:42

## 2024-04-04 RX ADMIN — ACETAMINOPHEN 650 MG: 325 TABLET ORAL at 01:39

## 2024-04-04 RX ADMIN — METHOCARBAMOL 1500 MG: 500 TABLET ORAL at 01:44

## 2024-04-04 ASSESSMENT — PAIN SCALES - GENERAL: PAINLEVEL_OUTOF10: 5

## 2024-04-04 NOTE — ED NOTES
This RN announced presence to pt and entered room, whereupon pt found to have covered self with sheet in bed. Pt states, \"I'm naked.\" Pt does not provide concise response as to what led to the removal of his clothes. This RN requested pt provide urine sample; pt states, \"I already did;\" pt does not respond when asked where the sample is/where or when he urinated. Unused urinal noted on bedside tray. Pt A&Ox4, speech clear.    Antibiotic ointment and bandage applied to abrasion on right leg below knee. Pt tolerated well; pt denies complaints at this time.
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
## 2024-04-07 ENCOUNTER — APPOINTMENT (OUTPATIENT)
Facility: HOSPITAL | Age: 64
End: 2024-04-07
Payer: MEDICAID

## 2024-04-07 ENCOUNTER — HOSPITAL ENCOUNTER (EMERGENCY)
Facility: HOSPITAL | Age: 64
Discharge: HOME | End: 2024-04-07
Attending: STUDENT IN AN ORGANIZED HEALTH CARE EDUCATION/TRAINING PROGRAM
Payer: MEDICAID

## 2024-04-07 VITALS
HEIGHT: 71 IN | OXYGEN SATURATION: 97 % | BODY MASS INDEX: 23.52 KG/M2 | SYSTOLIC BLOOD PRESSURE: 138 MMHG | HEART RATE: 96 BPM | RESPIRATION RATE: 18 BRPM | DIASTOLIC BLOOD PRESSURE: 87 MMHG | TEMPERATURE: 97.3 F | WEIGHT: 168 LBS

## 2024-04-07 DIAGNOSIS — S09.8XXA BLUNT HEAD TRAUMA, INITIAL ENCOUNTER: Primary | ICD-10-CM

## 2024-04-07 PROCEDURE — 6370000000 HC RX 637 (ALT 250 FOR IP): Performed by: STUDENT IN AN ORGANIZED HEALTH CARE EDUCATION/TRAINING PROGRAM

## 2024-04-07 PROCEDURE — 70450 CT HEAD/BRAIN W/O DYE: CPT

## 2024-04-07 PROCEDURE — 72125 CT NECK SPINE W/O DYE: CPT

## 2024-04-07 PROCEDURE — 99284 EMERGENCY DEPT VISIT MOD MDM: CPT

## 2024-04-07 RX ORDER — ACETAMINOPHEN 325 MG/1
650 TABLET ORAL
Status: COMPLETED | OUTPATIENT
Start: 2024-04-07 | End: 2024-04-07

## 2024-04-07 RX ADMIN — ACETAMINOPHEN 650 MG: 325 TABLET ORAL at 04:13

## 2024-04-07 ASSESSMENT — PAIN - FUNCTIONAL ASSESSMENT: PAIN_FUNCTIONAL_ASSESSMENT: 0-10

## 2024-04-07 ASSESSMENT — PAIN SCALES - GENERAL: PAINLEVEL_OUTOF10: 10

## 2024-04-07 NOTE — ED TRIAGE NOTES
Pt arrives via ems c/o fall after trying to do a wheelie in his wheelchair. Sts \"I made it 5ft.\" Denies hitting head but sts he did yesterday. Ems sts etoh on board.Pt has bilateral BKA and all fingers except L thumb. Denies thinners.

## 2024-04-14 PROCEDURE — 99285 EMERGENCY DEPT VISIT HI MDM: CPT

## 2024-04-14 ASSESSMENT — PAIN - FUNCTIONAL ASSESSMENT: PAIN_FUNCTIONAL_ASSESSMENT: 0-10

## 2024-04-14 ASSESSMENT — PAIN SCALES - GENERAL: PAINLEVEL_OUTOF10: 10

## 2024-04-15 ENCOUNTER — APPOINTMENT (OUTPATIENT)
Facility: HOSPITAL | Age: 64
End: 2024-04-15
Payer: MEDICAID

## 2024-04-15 ENCOUNTER — APPOINTMENT (OUTPATIENT)
Facility: HOSPITAL | Age: 64
End: 2024-04-15
Attending: EMERGENCY MEDICINE
Payer: MEDICAID

## 2024-04-15 ENCOUNTER — HOSPITAL ENCOUNTER (EMERGENCY)
Facility: HOSPITAL | Age: 64
Discharge: HOME | End: 2024-04-15
Attending: EMERGENCY MEDICINE
Payer: MEDICAID

## 2024-04-15 VITALS
BODY MASS INDEX: 22.9 KG/M2 | WEIGHT: 160 LBS | HEART RATE: 72 BPM | RESPIRATION RATE: 16 BRPM | OXYGEN SATURATION: 100 % | SYSTOLIC BLOOD PRESSURE: 101 MMHG | HEIGHT: 70 IN | DIASTOLIC BLOOD PRESSURE: 62 MMHG | TEMPERATURE: 97.4 F

## 2024-04-15 DIAGNOSIS — M25.512 CHRONIC LEFT SHOULDER PAIN: ICD-10-CM

## 2024-04-15 DIAGNOSIS — I82.432 DEEP VEIN THROMBOSIS (DVT) OF POPLITEAL VEIN OF LEFT LOWER EXTREMITY, UNSPECIFIED CHRONICITY (HCC): Primary | ICD-10-CM

## 2024-04-15 DIAGNOSIS — D64.9 ANEMIA, UNSPECIFIED TYPE: ICD-10-CM

## 2024-04-15 DIAGNOSIS — G89.29 CHRONIC LEFT SHOULDER PAIN: ICD-10-CM

## 2024-04-15 LAB
ALBUMIN SERPL-MCNC: 3 G/DL (ref 3.4–5)
ALBUMIN/GLOB SERPL: 1 (ref 0.8–1.7)
ALP SERPL-CCNC: 146 U/L (ref 45–117)
ALT SERPL-CCNC: 21 U/L (ref 16–61)
ANION GAP SERPL CALC-SCNC: 6 MMOL/L (ref 3–18)
AST SERPL-CCNC: 17 U/L (ref 10–38)
BASOPHILS # BLD: 0.1 K/UL (ref 0–0.1)
BASOPHILS NFR BLD: 1 % (ref 0–2)
BILIRUB SERPL-MCNC: 0.5 MG/DL (ref 0.2–1)
BUN SERPL-MCNC: 16 MG/DL (ref 7–18)
BUN/CREAT SERPL: 20 (ref 12–20)
CALCIUM SERPL-MCNC: 8.7 MG/DL (ref 8.5–10.1)
CHLORIDE SERPL-SCNC: 109 MMOL/L (ref 100–111)
CO2 SERPL-SCNC: 24 MMOL/L (ref 21–32)
CREAT SERPL-MCNC: 0.81 MG/DL (ref 0.6–1.3)
DIFFERENTIAL METHOD BLD: ABNORMAL
EOSINOPHIL # BLD: 0.3 K/UL (ref 0–0.4)
EOSINOPHIL NFR BLD: 4 % (ref 0–5)
ERYTHROCYTE [DISTWIDTH] IN BLOOD BY AUTOMATED COUNT: 19.4 % (ref 11.6–14.5)
GLOBULIN SER CALC-MCNC: 3.1 G/DL (ref 2–4)
GLUCOSE SERPL-MCNC: 88 MG/DL (ref 74–99)
HCT VFR BLD AUTO: 28.9 % (ref 36–48)
HGB BLD-MCNC: 8.6 G/DL (ref 13–16)
IMM GRANULOCYTES # BLD AUTO: 0 K/UL (ref 0–0.04)
IMM GRANULOCYTES NFR BLD AUTO: 0 % (ref 0–0.5)
LYMPHOCYTES # BLD: 1.7 K/UL (ref 0.9–3.6)
LYMPHOCYTES NFR BLD: 25 % (ref 21–52)
MCH RBC QN AUTO: 23.5 PG (ref 24–34)
MCHC RBC AUTO-ENTMCNC: 29.8 G/DL (ref 31–37)
MCV RBC AUTO: 79 FL (ref 78–100)
MONOCYTES # BLD: 0.9 K/UL (ref 0.05–1.2)
MONOCYTES NFR BLD: 13 % (ref 3–10)
NEUTS SEG # BLD: 3.9 K/UL (ref 1.8–8)
NEUTS SEG NFR BLD: 58 % (ref 40–73)
NRBC # BLD: 0 K/UL (ref 0–0.01)
NRBC BLD-RTO: 0 PER 100 WBC
NT PRO BNP: 44 PG/ML (ref 0–900)
PLATELET # BLD AUTO: 570 K/UL (ref 135–420)
PMV BLD AUTO: 9.3 FL (ref 9.2–11.8)
POTASSIUM SERPL-SCNC: 3.6 MMOL/L (ref 3.5–5.5)
PROT SERPL-MCNC: 6.1 G/DL (ref 6.4–8.2)
RBC # BLD AUTO: 3.66 M/UL (ref 4.35–5.65)
SODIUM SERPL-SCNC: 139 MMOL/L (ref 136–145)
WBC # BLD AUTO: 6.7 K/UL (ref 4.6–13.2)

## 2024-04-15 PROCEDURE — 85025 COMPLETE CBC W/AUTO DIFF WBC: CPT

## 2024-04-15 PROCEDURE — 6360000004 HC RX CONTRAST MEDICATION: Performed by: EMERGENCY MEDICINE

## 2024-04-15 PROCEDURE — 74176 CT ABD & PELVIS W/O CONTRAST: CPT

## 2024-04-15 PROCEDURE — 80053 COMPREHEN METABOLIC PANEL: CPT

## 2024-04-15 PROCEDURE — 93970 EXTREMITY STUDY: CPT

## 2024-04-15 PROCEDURE — 71275 CT ANGIOGRAPHY CHEST: CPT

## 2024-04-15 PROCEDURE — 83880 ASSAY OF NATRIURETIC PEPTIDE: CPT

## 2024-04-15 RX ADMIN — IOPAMIDOL 75 ML: 755 INJECTION, SOLUTION INTRAVENOUS at 05:46

## 2024-04-15 ASSESSMENT — PAIN - FUNCTIONAL ASSESSMENT: PAIN_FUNCTIONAL_ASSESSMENT: NONE - DENIES PAIN

## 2024-04-15 NOTE — ED PROVIDER NOTES
Exclusion criteria - the patient is NOT to be included for SEP-1 Core Measure due to: Infection is not suspected    MEDICATIONS ADMINISTERED IN THE ED:  Medications   iopamidol (ISOVUE-370) 76 % injection 75 mL (75 mLs IntraVENous Given 4/15/24 0546)            None    Medical Decision Making   My differential diagnosis on first evaluation of the patient included but was not limited to DVT, CHF, dependent edema.  His left shoulder pain is likely due to arthritis although rotator cuff tear is possible.    He tells me that he had a colonoscopy a few years ago and was told to come back in 5 years.  He is unsure of the date or where.  Says he has vomited about every 2 days.  No rectal pain.  I had a long discussion with the patient and engaged him in shared decision-making.  I strongly recommend admission to the hospital.  He is very against admission to the hospital.   He is also reluctant to start a blood thinner.  I am also reluctant to start blood thinner as he is anemic and do not have a clear cause at this time.  I did explain how dangerous anemia and blood clots can be.  I did explain that the risk include serious to debility and death.  He expressed understanding.  He says that he has plans to see a new PCP later today.  He does not want to mess this appointment up as he has not had a PCP in some time.      I discussed return precautions extensively. Positive and negative test results were discussed. My clinical assessment and recommendations were discussed. They agree with the plan of discharge.  All questions were answered and they are in agreement with plan.  I told him that if he has any trouble getting to his PCP appointment today or is unable to see a PCP within the next 1 to 2 days he should return to the emergency department.    Critical Care: None  Diagnosis and Disposition   DISPOSITION      DISCHARGE NOTE:  Lamont Naranjo's  results have been reviewed with him.  He has been counseled regarding his

## 2024-04-15 NOTE — ED TRIAGE NOTES
Keeps bilat amputation sites at dependent and noticed increased pain. Pt reports possible splinter to left thumb. Pain to left shoulder, Possible overuse from wheeling chair.

## 2024-04-20 ENCOUNTER — HOSPITAL ENCOUNTER (EMERGENCY)
Facility: HOSPITAL | Age: 64
Discharge: HOME | End: 2024-04-20
Attending: EMERGENCY MEDICINE
Payer: MEDICAID

## 2024-04-20 ENCOUNTER — APPOINTMENT (OUTPATIENT)
Facility: HOSPITAL | Age: 64
End: 2024-04-20
Payer: MEDICAID

## 2024-04-20 VITALS
TEMPERATURE: 97.4 F | DIASTOLIC BLOOD PRESSURE: 86 MMHG | OXYGEN SATURATION: 96 % | HEIGHT: 70 IN | RESPIRATION RATE: 16 BRPM | HEART RATE: 85 BPM | WEIGHT: 160 LBS | SYSTOLIC BLOOD PRESSURE: 120 MMHG | BODY MASS INDEX: 22.9 KG/M2
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
## 2024-04-20 PROCEDURE — 99283 EMERGENCY DEPT VISIT LOW MDM: CPT

## 2024-04-20 ASSESSMENT — PAIN DESCRIPTION - ORIENTATION: ORIENTATION: LEFT

## 2024-04-20 ASSESSMENT — PAIN SCALES - GENERAL: PAINLEVEL_OUTOF10: 10

## 2024-04-20 ASSESSMENT — PAIN - FUNCTIONAL ASSESSMENT: PAIN_FUNCTIONAL_ASSESSMENT: 0-10

## 2024-04-20 ASSESSMENT — PAIN DESCRIPTION - LOCATION: LOCATION: FINGER (COMMENT WHICH ONE);SHOULDER

## 2024-04-20 NOTE — ED PROVIDER NOTES
[facility] EMERGENCY DEPT  EMERGENCY DEPARTMENT HISTORY AND PHYSICAL EXAM      Date: 4/20/2024  Patient Name: Lamont Naranjo      History of Presenting Illness       Chief Complaint   Patient presents with    Fall       History was provided by: Patient    Location/Duration/Severity/Modifying factors     Lamont Naranjo is a 63 y.o. male who arrived to the emergency department by by EMS where they received No additional treatment with complaints of Fall        63-year-old male with history of anxiety, depression, PTSD who presents to the emergency department with a chief complaint of possible fall out of his wheelchair.  Per EMS patient was in his wheelchair and reportedly fell out of the wheelchair onto concrete.  When I assessed the patient is very drowsy, does not provide much history, shaking his head when asked if he fell however when asked if he had pain nodding his head yes.  Patient known to our ED for prior visits similar to this.  He denies any specific areas of pain, arouses when I saw his name          There are no other complaints, changes, or physical findings at this time.    PCP: Unknown, Provider, APRN - NP    No current facility-administered medications for this encounter.     Current Outpatient Medications   Medication Sig Dispense Refill    lidocaine (LIDODERM) 5 % Place 1 patch onto the skin daily 12 hours on, 12 hours off. 30 patch 0    acetaminophen (TYLENOL) 325 MG tablet Take 650 mg by mouth every 4 hours as needed      gabapentin (NEURONTIN) 300 MG capsule Take 300 mg by mouth 3 times daily.      sertraline (ZOLOFT) 50 MG tablet Take 50 mg by mouth daily         Past History     Past Medical History:  Past Medical History:   Diagnosis Date    Anxiety     Depression     Enlarged prostate     PTSD (post-traumatic stress disorder)        Past Surgical History:  Past Surgical History:   Procedure Laterality Date    ORTHOPEDIC SURGERY      double amputee       Family History:  History reviewed.  DO      Disposition       Disposition: Discharged Home    DISCHARGE: At this time, patient is stable and appropriate for discharge home.  Patient demonstrates understanding of current diagnoses and is in agreement with the treatment plan.  They are advised that while the likelihood of serious underlying condition is low at this point given the evaluation performed today, we cannot fully rule it out.  They are advised to immediately return with any new symptoms or worsening of current condition. Any Incidental findings were noted on the patient's discharge paperwork as well as verbally directly to the patient, and the appropriate follow-up was given to the patient as far as instructions on testing needed as well as the timeframe.  All questions have been answered.  Patient is given educational material regarding their diagnoses, including danger symptoms and when to return to the ED.       Diagnosis     Clinical Impression:   1. Fall from wheelchair, initial encounter        PATIENT REFERRED TO:  Your Primary Care Physician  Call your primary care/family medicine provider to set up a follow-up visit to discuss the care in the emergency room today.  Call       [facility] EMERGENCY DEPT  [address] [place] [id number] [phone]    As needed, If symptoms worsen      DISCHARGE MEDICATIONS:  Discharge Medication List as of 4/20/2024  6:03 AM          Attestations:    Jose Miguel Lee DO    Please note that this dictation was completed with Boomr, the computer voice recognition software.  Quite often unanticipated grammatical, syntax, homophones, and other interpretive errors are inadvertently transcribed by the computer software.  Please disregard these errors.  Please excuse any errors that have escaped final proofreading.  Thank you.         Jose Miguel Lee DO  04/20/24 2003

## 2024-04-20 NOTE — ED NOTES
Patient given discharge papers. Patient understanding of discharge. Patient ambulatory out of ED

## 2024-04-20 NOTE — DISCHARGE INSTRUCTIONS
Follow-up with your primary doctor, return to emergency room if there are any changes in the interim         Thank you!  Thank you for allowing me to care for you in the emergency department. It is my goal to provide you with excellent care.  Please fill out the survey that will come to you by mail or email since we listen to your feedback!     Below you will find a list of your tests from today's visit.  Should you have any questions, please do not hesitate to call the emergency department.    Labs  No results found for this or any previous visit (from the past 12 hour(s)).    Radiologic Studies  CT Head W/O Contrast    (Results Pending)     ------------------------------------------------------------------------------------------------------------  The exam and treatment you received in the Emergency Department were for an urgent problem and are not intended as complete care. It is important that you follow-up with a doctor, nurse practitioner, or physician assistant to:  (1) confirm your diagnosis,  (2) re-evaluation of changes in your illness and treatment, and (3) for ongoing care. Please take your discharge instructions with you when you go to your follow-up appointment.     If you have any problem arranging a follow-up appointment, contact the Emergency Department.  If your symptoms become worse or you do not improve as expected and you are unable to reach your health care provider, please return to the Emergency Department. We are available 24 hours a day.     If a prescription has been provided, please have it filled as soon as possible to prevent a delay in treatment. If you have any questions or reservations about taking the medication due to side effects or interactions with other medications, please call your primary care provider or contact the ER.

## 2024-04-20 NOTE — ED TRIAGE NOTES
Pt arrived to ED via EMS c/o fall from wheelchair. Pt states he \"hurts all over\". Pt states L shoulder is most painful along with nubs on fingers. Pt states when he fell, he fell onto concrete but denies LOC or head trauma.

## 2024-04-21 ENCOUNTER — HOSPITAL ENCOUNTER (EMERGENCY)
Facility: HOSPITAL | Age: 64
Discharge: HOME | End: 2024-04-21
Attending: EMERGENCY MEDICINE
Payer: MEDICAID

## 2024-04-21 ENCOUNTER — APPOINTMENT (OUTPATIENT)
Facility: HOSPITAL | Age: 64
End: 2024-04-21
Payer: MEDICAID

## 2024-04-21 VITALS
RESPIRATION RATE: 18 BRPM | SYSTOLIC BLOOD PRESSURE: 108 MMHG | OXYGEN SATURATION: 100 % | TEMPERATURE: 98 F | HEART RATE: 71 BPM | DIASTOLIC BLOOD PRESSURE: 70 MMHG

## 2024-04-21 DIAGNOSIS — T14.8XXA ABRASION: ICD-10-CM
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
## 2024-04-21 LAB
APPEARANCE UR: CLEAR
BACTERIA URNS QL MICRO: NEGATIVE /HPF
BILIRUB UR QL: NEGATIVE
COLOR UR: YELLOW
EPITH CASTS URNS QL MICRO: NEGATIVE /LPF (ref 0–5)
GLUCOSE UR STRIP.AUTO-MCNC: NEGATIVE MG/DL
HGB UR QL STRIP: NEGATIVE
KETONES UR QL STRIP.AUTO: NEGATIVE MG/DL
LEUKOCYTE ESTERASE UR QL STRIP.AUTO: ABNORMAL
NITRITE UR QL STRIP.AUTO: NEGATIVE
PH UR STRIP: 5 (ref 5–8)
PROT UR STRIP-MCNC: NEGATIVE MG/DL
RBC #/AREA URNS HPF: NEGATIVE /HPF (ref 0–5)
SP GR UR REFRACTOMETRY: 1.01 (ref 1–1.03)
UROBILINOGEN UR QL STRIP.AUTO: 0.2 EU/DL (ref 0.2–1)
WBC URNS QL MICRO: NORMAL /HPF (ref 0–5)

## 2024-04-21 PROCEDURE — 90471 IMMUNIZATION ADMIN: CPT | Performed by: EMERGENCY MEDICINE

## 2024-04-21 PROCEDURE — 6360000002 HC RX W HCPCS: Performed by: EMERGENCY MEDICINE

## 2024-04-21 PROCEDURE — 73080 X-RAY EXAM OF ELBOW: CPT

## 2024-04-21 PROCEDURE — 81001 URINALYSIS AUTO W/SCOPE: CPT

## 2024-04-21 PROCEDURE — 90715 TDAP VACCINE 7 YRS/> IM: CPT | Performed by: EMERGENCY MEDICINE

## 2024-04-21 RX ADMIN — TETANUS TOXOID, REDUCED DIPHTHERIA TOXOID AND ACELLULAR PERTUSSIS VACCINE, ADSORBED 0.5 ML: 5; 2.5; 8; 8; 2.5 SUSPENSION INTRAMUSCULAR at 00:25

## 2024-04-21 NOTE — ED TRIAGE NOTES
Patient arrrived to the ED stating he \"popped 2 wheelies\" and fell out of his wheelchair an hour ago. Patient states he landed on his elbows, but did not hit his head. Alert and oriented.

## 2024-04-21 NOTE — ED PROVIDER NOTES
times daily.Historical Med      sertraline (ZOLOFT) 50 MG tablet Take 50 mg by mouth dailyHistorical Med             DISCONTINUED MEDICATIONS:  Discharge Medication List as of 4/21/2024  1:39 AM          PATIENT REFERRED TO:  Follow Up with:  PCP    Schedule an appointment as soon as possible for a visit       [facility] EMERGENCY DEPT  [address] [facility] [address]  [phone]  Go to   If symptoms worsen      Dragon Disclaimer     Please note that this dictation was completed with TNC, the computer voice recognition software. Quite often unanticipated grammatical, syntax, homophones, and other interpretive errors are inadvertently transcribed by the computer software. Please disregard these errors. Please excuse any errors that have escaped final proofreading.      I Kike Begum MD am the primary clinician of record.  Kike Begum MD  (Electronically signed)            Kike Begum MD  04/21/24 0316

## 2024-04-25 ENCOUNTER — HOSPITAL ENCOUNTER (EMERGENCY)
Facility: HOSPITAL | Age: 64
Discharge: HOME | End: 2024-04-25
Attending: EMERGENCY MEDICINE
Payer: MEDICAID

## 2024-04-25 VITALS
HEART RATE: 103 BPM | SYSTOLIC BLOOD PRESSURE: 117 MMHG | TEMPERATURE: 97.8 F | DIASTOLIC BLOOD PRESSURE: 63 MMHG | RESPIRATION RATE: 18 BRPM | OXYGEN SATURATION: 97 %

## 2024-04-25 DIAGNOSIS — S88.912A: ICD-10-CM
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
## 2024-04-25 DIAGNOSIS — S88.911A: ICD-10-CM

## 2024-04-25 DIAGNOSIS — S46.811A TRAPEZIUS MUSCLE STRAIN, RIGHT, INITIAL ENCOUNTER: ICD-10-CM

## 2024-04-25 PROCEDURE — 6370000000 HC RX 637 (ALT 250 FOR IP): Performed by: EMERGENCY MEDICINE

## 2024-04-25 PROCEDURE — 99283 EMERGENCY DEPT VISIT LOW MDM: CPT

## 2024-04-25 RX ORDER — METHOCARBAMOL 500 MG/1
1000 TABLET, FILM COATED ORAL
Status: COMPLETED | OUTPATIENT
Start: 2024-04-25 | End: 2024-04-25

## 2024-04-25 RX ORDER — LIDOCAINE 4 G/G
1 PATCH TOPICAL
Status: DISCONTINUED | OUTPATIENT
Start: 2024-04-25 | End: 2024-04-25 | Stop reason: HOSPADM

## 2024-04-25 RX ADMIN — METHOCARBAMOL 1000 MG: 500 TABLET ORAL at 05:22

## 2024-04-25 RX ADMIN — PREDNISONE 30 MG: 20 TABLET ORAL at 05:21

## 2024-04-25 ASSESSMENT — PAIN SCALES - GENERAL: PAINLEVEL_OUTOF10: 10

## 2024-04-25 NOTE — ED NOTES
EMERGENCY DEPARTMENT HISTORY AND PHYSICAL EXAM    Date: 4/25/2024  Patient Name: Lamont Naranjo    History of Presenting Illness     Chief Complaint   Patient presents with    Fall         History Provided By: Patient    Additional History (Context):   5:05 AM EDT  Lamont Naranjo is a 63 y.o. male with PMHX of bilateral lower extremity DVT with chronic phantom pain, missing finger digits, anxiety, PTSD, malingering, substance abuse who presents to the emergency department C/O fall from his wheelchair.  Patient was transported to the emergency department by paramedics after reported falling from his wheelchair while doing release.  He is awake alert and oriented and lucid talking to me about the length of his wheelchair will easily tempting to go from 30 feet to 50 feet.  He has no visible abrasion or lesion.  He moves all extremities without difficulties or focal complaints.  He complains of generalized soreness and achiness to his trapezius.    Social History  Often drinks alcohol as well as tobacco use..  Along with known history of polysubstance abuse including marijuana cocaine.     Family History  No significant family history    PCP: Unknown, Provider, APRN - NP    Current Facility-Administered Medications   Medication Dose Route Frequency Provider Last Rate Last Admin    lidocaine 4 % external patch 1 patch  1 patch TransDERmal NOW Wolf Glaser MD   1 patch at 04/25/24 2163     Current Outpatient Medications   Medication Sig Dispense Refill    lidocaine (LIDODERM) 5 % Place 1 patch onto the skin daily 12 hours on, 12 hours off. 30 patch 0    acetaminophen (TYLENOL) 325 MG tablet Take 650 mg by mouth every 4 hours as needed      gabapentin (NEURONTIN) 300 MG capsule Take 300 mg by mouth 3 times daily.      sertraline (ZOLOFT) 50 MG tablet Take 50 mg by mouth daily         Past History     Past Medical History:  Past Medical History:   Diagnosis Date    Anxiety     Depression     Enlarged

## 2024-04-25 NOTE — ED TRIAGE NOTES
Patient arrived to the ED via medic with complaints of a fall after doing wheelies in his wheelchair. States he did not fall on his head. Left shoulder pain. Patient began doing wheelies on his way out of triage.

## 2024-04-26 ENCOUNTER — HOSPITAL ENCOUNTER (EMERGENCY)
Facility: HOSPITAL | Age: 64
Discharge: HOME | End: 2024-04-26
Attending: EMERGENCY MEDICINE
Payer: MEDICAID

## 2024-04-26 ENCOUNTER — APPOINTMENT (OUTPATIENT)
Facility: HOSPITAL | Age: 64
End: 2024-04-26
Payer: MEDICAID

## 2024-04-26 ENCOUNTER — HOSPITAL ENCOUNTER (EMERGENCY)
Facility: HOSPITAL | Age: 64
Discharge: HOME | End: 2024-04-27
Attending: EMERGENCY MEDICINE
Payer: MEDICAID

## 2024-04-26 VITALS
RESPIRATION RATE: 18 BRPM | TEMPERATURE: 97.4 F | DIASTOLIC BLOOD PRESSURE: 89 MMHG | HEART RATE: 79 BPM | OXYGEN SATURATION: 99 % | SYSTOLIC BLOOD PRESSURE: 155 MMHG

## 2024-04-26 VITALS
BODY MASS INDEX: 22.9 KG/M2 | OXYGEN SATURATION: 99 % | RESPIRATION RATE: 18 BRPM | TEMPERATURE: 97.7 F | HEIGHT: 70 IN | DIASTOLIC BLOOD PRESSURE: 78 MMHG | WEIGHT: 160 LBS | SYSTOLIC BLOOD PRESSURE: 118 MMHG | HEART RATE: 95 BPM

## 2024-04-26 DIAGNOSIS — M70.22 OLECRANON BURSITIS OF LEFT ELBOW: ICD-10-CM

## 2024-04-26 DIAGNOSIS — Z76.5 MALINGERING: ICD-10-CM

## 2024-04-26 DIAGNOSIS — T14.8XXA ABRASION: ICD-10-CM
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
## 2024-04-26 DIAGNOSIS — L98.491 SKIN ULCER, LIMITED TO BREAKDOWN OF SKIN (HCC): Primary | ICD-10-CM

## 2024-04-26 PROCEDURE — 99283 EMERGENCY DEPT VISIT LOW MDM: CPT

## 2024-04-26 PROCEDURE — 73560 X-RAY EXAM OF KNEE 1 OR 2: CPT

## 2024-04-26 PROCEDURE — 73080 X-RAY EXAM OF ELBOW: CPT

## 2024-04-26 PROCEDURE — 6370000000 HC RX 637 (ALT 250 FOR IP): Performed by: EMERGENCY MEDICINE

## 2024-04-26 RX ORDER — GINSENG 100 MG
CAPSULE ORAL
Status: COMPLETED | OUTPATIENT
Start: 2024-04-26 | End: 2024-04-26

## 2024-04-26 RX ORDER — ACETAMINOPHEN 500 MG
1000 TABLET ORAL
Status: COMPLETED | OUTPATIENT
Start: 2024-04-26 | End: 2024-04-26

## 2024-04-26 RX ORDER — GINSENG 100 MG
CAPSULE ORAL
Status: DISCONTINUED | OUTPATIENT
Start: 2024-04-26 | End: 2024-04-27 | Stop reason: HOSPADM

## 2024-04-26 RX ORDER — LIDOCAINE 4 G/G
1 PATCH TOPICAL
Status: DISCONTINUED | OUTPATIENT
Start: 2024-04-26 | End: 2024-04-26 | Stop reason: HOSPADM

## 2024-04-26 RX ADMIN — ACETAMINOPHEN 1000 MG: 500 TABLET ORAL at 01:06

## 2024-04-26 RX ADMIN — BACITRACIN: 500 OINTMENT TOPICAL at 01:07

## 2024-04-26 NOTE — ED NOTES
Patient discharged at this time. Discharge instructions explained to patient and patient verbalized understanding. Patient is alert and oriented at discharge.

## 2024-04-26 NOTE — DISCHARGE INSTRUCTIONS
Can take Tylenol 1 g 3 times a day for pain relief and apply topical Voltaren gel, lidocaine patch to area of swelling, pain to the left elbow or soreness to other areas.  Would also apply ice pack 2-3 times per day to the swollen area.  Would avoid NSAID class medication such as Aleve, Ibuprofen, Aspirin as may increase your bleeding risk while on Xarelto and Eliquis

## 2024-04-26 NOTE — ED TRIAGE NOTES
Patient in ED via EMS for doing wheelies in his wheelchair and falling out of it. Patient was seen at [facility] earlier yesterday. Patient states left elbow pain.

## 2024-04-26 NOTE — ED PROVIDER NOTES
Allergies:  Allergies   Allergen Reactions    Grass Pollen(K-O-R-T-Swt Ajg) Itching    Molds & Smuts Other (See Comments)     Runny nose, sneezing, itchy nose         Physical Exam     General: Patient is awake and alert, resting comfortably in no acute distress.  Respiratory: Normal respiratory effort.   Skin: Ovoid ~2 cm ulcer through dermal layer to anterior right lower leg above BKA stump, no surrounding erythema, induration.  MSK: Moderate degree of edema noted to the left olecranon, no abrasions, lacerations noted  Neuro: The patient is alert and oriented, possibly intoxicated.        Lab and Diagnostic Study Results     Labs -  No results found for this or any previous visit (from the past 24 hour(s)).    Radiologic Studies -   XR ELBOW LEFT (MIN 3 VIEWS)    (Results Pending)         Procedures and Critical Care     Performed by: VINCE NG, DO    Procedures     VINCE NG, DO    Medical Decision Making and ED Course   - I am the first and primary provider for this patient AND AM THE PRIMARY PROVIDER OF RECORD.    - I reviewed the vital signs, available nursing notes, past medical history, past surgical history, family history and social history.    - Initial assessment performed. The patients presenting problems have been discussed, and the staff are in agreement with the care plan formulated and outlined with them.  I have encouraged them to ask questions as they arise throughout their visit.    Vital Signs-Reviewed the patient's vital signs.    Patient Vitals for the past 12 hrs:   Temp Pulse Resp BP SpO2   04/26/24 0040 97.7 °F (36.5 °C) 95 18 118/78 99 %         Provider Notes (Medical Decision Making):          Clinical presentation most consistent with ground-level fall off of wheelchair, superficial right lower leg skin ulcer, possible left elbow contusion.  Administered Tylenol 1 g, lidocaine patch.    Elbow x-ray showed no evidence of fracture, dislocation.  On exam, there is seems to  Topical Given 4/26/24 0107)   acetaminophen (TYLENOL) tablet 1,000 mg (1,000 mg Oral Given 4/26/24 0106)       Final Diagnosis:  1. Skin ulcer, limited to breakdown of skin (HCC)    2. Olecranon bursitis of left elbow        Disposition:  Destination: Discharge    Discharge Rx:   New Prescriptions    No medications on file         Dictation disclaimer: Please note that this dictation was completed with Xooker, the computer voice recognition software. Quite often unanticipated grammatical, syntax, homophones, and other interpretive errors are inadvertently transcribed by the computer software. Please disregard these errors. Please excuse any errors that have escaped final proofreading.     Huang Machado D.O.  Emergency Physician   [facility]             Huang Machado DO  04/26/24 0110

## 2024-04-27 NOTE — ED PROVIDER NOTES
[facility] EMERGENCY DEPT  EMERGENCY DEPARTMENT HISTORY AND PHYSICAL EXAM      Date: 4/26/2024  Patient Name: Lamont Naranjo      History of Presenting Illness       Chief Complaint   Patient presents with    Fall       History was provided by: {[email or web address]:[id number]}    Location/Duration/Severity/Modifying factors     Lamont Naranjo is a 63 y.o. male who arrived to the emergency department by {ARRIVAL:[id number]} with complaints of Fall        Patient is a 63-year-old male who presents to the emergency room with a chief complaint of fall from wheelchair.  Patient states he was \"popping           There are no other complaints, changes, or physical findings at this time.    PCP: Unknown, Provider, APRN - NP    Current Facility-Administered Medications   Medication Dose Route Frequency Provider Last Rate Last Admin    bacitracin ointment   Topical NOW Jose Miguel Lee, DO         Current Outpatient Medications   Medication Sig Dispense Refill    lidocaine (LIDODERM) 5 % Place 1 patch onto the skin daily 12 hours on, 12 hours off. 30 patch 0    acetaminophen (TYLENOL) 325 MG tablet Take 650 mg by mouth every 4 hours as needed      gabapentin (NEURONTIN) 300 MG capsule Take 300 mg by mouth 3 times daily.      sertraline (ZOLOFT) 50 MG tablet Take 50 mg by mouth daily         Past History     Past Medical History:  Past Medical History:   Diagnosis Date    Anxiety     Depression     Enlarged prostate     PTSD (post-traumatic stress disorder)        Past Surgical History:  Past Surgical History:   Procedure Laterality Date    ORTHOPEDIC SURGERY      double amputee       Family History:  History reviewed. No pertinent family history.    Social History:  Social History     Tobacco Use    Smoking status: Every Day   Substance Use Topics    Alcohol use: Yes    Drug use: Yes     Types: Cocaine, Marijuana (Weed)       Allergies:  Allergies   Allergen Reactions    Grass Pollen(K-O-R-T-Swt Jag) Itching    Molds & Smuts Other (See

## 2024-04-27 NOTE — ED TRIAGE NOTES
Pt presents to ED via EMS with c/o abrasion to right leg stump area s/p doing wheelie's and playing around in w/c [employer]

## 2024-05-19 ENCOUNTER — HOSPITAL ENCOUNTER (EMERGENCY)
Facility: HOSPITAL | Age: 64
Discharge: HOME | End: 2024-05-19
Attending: STUDENT IN AN ORGANIZED HEALTH CARE EDUCATION/TRAINING PROGRAM
Payer: MEDICAID

## 2024-05-19 ENCOUNTER — APPOINTMENT (OUTPATIENT)
Facility: HOSPITAL | Age: 64
End: 2024-05-19
Payer: MEDICAID

## 2024-05-19 VITALS
HEART RATE: 83 BPM | TEMPERATURE: 97.9 F | SYSTOLIC BLOOD PRESSURE: 136 MMHG | HEIGHT: 70 IN | WEIGHT: 160 LBS | DIASTOLIC BLOOD PRESSURE: 80 MMHG | OXYGEN SATURATION: 100 % | RESPIRATION RATE: 15 BRPM | BODY MASS INDEX: 22.9 KG/M2

## 2024-05-19 DIAGNOSIS — R07.1 CHEST PAIN ON BREATHING: Primary | ICD-10-CM

## 2024-05-19 LAB
ALBUMIN SERPL-MCNC: 3.8 G/DL (ref 3.4–5)
ALBUMIN/GLOB SERPL: 1 (ref 0.8–1.7)
ALP SERPL-CCNC: 144 U/L (ref 45–117)
ALT SERPL-CCNC: 28 U/L (ref 16–61)
ANION GAP SERPL CALC-SCNC: 6 MMOL/L (ref 3–18)
AST SERPL-CCNC: 28 U/L (ref 10–38)
BASOPHILS # BLD: 0 K/UL (ref 0–0.1)
BASOPHILS NFR BLD: 0 % (ref 0–2)
BILIRUB SERPL-MCNC: 1.1 MG/DL (ref 0.2–1)
BUN SERPL-MCNC: 7 MG/DL (ref 7–18)
BUN/CREAT SERPL: 9 (ref 12–20)
CALCIUM SERPL-MCNC: 10 MG/DL (ref 8.5–10.1)
CHLORIDE SERPL-SCNC: 102 MMOL/L (ref 100–111)
CO2 SERPL-SCNC: 27 MMOL/L (ref 21–32)
CREAT SERPL-MCNC: 0.78 MG/DL (ref 0.6–1.3)
D DIMER PPP FEU-MCNC: 0.43 UG/ML(FEU)
DIFFERENTIAL METHOD BLD: ABNORMAL
EOSINOPHIL # BLD: 0 K/UL (ref 0–0.4)
EOSINOPHIL NFR BLD: 0 % (ref 0–5)
ERYTHROCYTE [DISTWIDTH] IN BLOOD BY AUTOMATED COUNT: 18.3 % (ref 11.6–14.5)
GLOBULIN SER CALC-MCNC: 3.9 G/DL (ref 2–4)
GLUCOSE SERPL-MCNC: 109 MG/DL (ref 74–99)
HCT VFR BLD AUTO: 34.5 % (ref 36–48)
HGB BLD-MCNC: 10.1 G/DL (ref 13–16)
IMM GRANULOCYTES # BLD AUTO: 0 K/UL (ref 0–0.04)
IMM GRANULOCYTES NFR BLD AUTO: 0 % (ref 0–0.5)
LYMPHOCYTES # BLD: 0.7 K/UL (ref 0.9–3.6)
LYMPHOCYTES NFR BLD: 9 % (ref 21–52)
MCH RBC QN AUTO: 21.9 PG (ref 24–34)
MCHC RBC AUTO-ENTMCNC: 29.3 G/DL (ref 31–37)
MCV RBC AUTO: 74.8 FL (ref 78–100)
MONOCYTES # BLD: 0.9 K/UL (ref 0.05–1.2)
MONOCYTES NFR BLD: 12 % (ref 3–10)
NEUTS SEG # BLD: 5.7 K/UL (ref 1.8–8)
NEUTS SEG NFR BLD: 78 % (ref 40–73)
NRBC # BLD: 0 K/UL (ref 0–0.01)
NRBC BLD-RTO: 0 PER 100 WBC
PLATELET # BLD AUTO: 590 K/UL (ref 135–420)
PMV BLD AUTO: 9.5 FL (ref 9.2–11.8)
POTASSIUM SERPL-SCNC: 4.3 MMOL/L (ref 3.5–5.5)
PROT SERPL-MCNC: 7.7 G/DL (ref 6.4–8.2)
RBC # BLD AUTO: 4.61 M/UL (ref 4.35–5.65)
SODIUM SERPL-SCNC: 135 MMOL/L (ref 136–145)
TROPONIN I SERPL HS-MCNC: 4 NG/L (ref 0–78)
TROPONIN I SERPL HS-MCNC: 4 NG/L (ref 0–78)
WBC # BLD AUTO: 7.3 K/UL (ref 4.6–13.2)

## 2024-05-19 PROCEDURE — 85379 FIBRIN DEGRADATION QUANT: CPT

## 2024-05-19 PROCEDURE — 6360000004 HC RX CONTRAST MEDICATION: Performed by: STUDENT IN AN ORGANIZED HEALTH CARE EDUCATION/TRAINING PROGRAM

## 2024-05-19 PROCEDURE — 84484 ASSAY OF TROPONIN QUANT: CPT

## 2024-05-19 PROCEDURE — 85025 COMPLETE CBC W/AUTO DIFF WBC: CPT

## 2024-05-19 PROCEDURE — 6360000002 HC RX W HCPCS: Performed by: STUDENT IN AN ORGANIZED HEALTH CARE EDUCATION/TRAINING PROGRAM

## 2024-05-19 PROCEDURE — 96374 THER/PROPH/DIAG INJ IV PUSH: CPT

## 2024-05-19 PROCEDURE — 99285 EMERGENCY DEPT VISIT HI MDM: CPT

## 2024-05-19 PROCEDURE — 71275 CT ANGIOGRAPHY CHEST: CPT

## 2024-05-19 PROCEDURE — 96375 TX/PRO/DX INJ NEW DRUG ADDON: CPT

## 2024-05-19 PROCEDURE — 80053 COMPREHEN METABOLIC PANEL: CPT

## 2024-05-19 RX ORDER — KETOROLAC TROMETHAMINE 15 MG/ML
15 INJECTION, SOLUTION INTRAMUSCULAR; INTRAVENOUS ONCE
Status: COMPLETED | OUTPATIENT
Start: 2024-05-19 | End: 2024-05-19

## 2024-05-19 RX ORDER — MORPHINE SULFATE 2 MG/ML
2 INJECTION, SOLUTION INTRAMUSCULAR; INTRAVENOUS
Status: COMPLETED | OUTPATIENT
Start: 2024-05-19 | End: 2024-05-19

## 2024-05-19 RX ORDER — ONDANSETRON 2 MG/ML
4 INJECTION INTRAMUSCULAR; INTRAVENOUS ONCE
Status: COMPLETED | OUTPATIENT
Start: 2024-05-19 | End: 2024-05-19

## 2024-05-19 RX ADMIN — IOPAMIDOL 71 ML: 755 INJECTION, SOLUTION INTRAVENOUS at 11:48

## 2024-05-19 RX ADMIN — KETOROLAC TROMETHAMINE 15 MG: 15 INJECTION, SOLUTION INTRAMUSCULAR; INTRAVENOUS at 14:30

## 2024-05-19 RX ADMIN — MORPHINE SULFATE 2 MG: 2 INJECTION, SOLUTION INTRAMUSCULAR; INTRAVENOUS at 12:11

## 2024-05-19 RX ADMIN — ONDANSETRON 4 MG: 2 INJECTION INTRAMUSCULAR; INTRAVENOUS at 12:10

## 2024-05-19 ASSESSMENT — PAIN SCALES - GENERAL: PAINLEVEL_OUTOF10: 9

## 2024-05-19 ASSESSMENT — PAIN DESCRIPTION - ORIENTATION: ORIENTATION: RIGHT

## 2024-05-19 ASSESSMENT — HEART SCORE: ECG: NORMAL

## 2024-05-19 ASSESSMENT — PAIN DESCRIPTION - LOCATION: LOCATION: ABDOMEN

## 2024-05-19 NOTE — ED NOTES
Patient given discharge papers. Patient understanding of discharge. Patient advised to follow up with primary provider for medication refills. Patient used personal wheelchair out of ED.

## 2024-05-19 NOTE — ED NOTES
ACCEPTING INFORMATION:    April in [facility] [address]  DR. JEFE JAUREGUI   [phone]

## 2024-05-19 NOTE — ED PROVIDER NOTES
given the following medications:  Medications   ketorolac (TORADOL) injection 15 mg (has no administration in time range)   morphine (PF) injection 2 mg (2 mg IntraVENous Given 5/19/24 1211)   ondansetron (ZOFRAN) injection 4 mg (4 mg IntraVENous Given 5/19/24 1210)   iopamidol (ISOVUE-370) 76 % injection 75 mL (71 mLs IntraVENous Given 5/19/24 1148)         Chronic Conditions: DVT recently diagnosed and on Eliquis.    Social Determinants affecting Dx or Tx: None    Records Reviewed (source and summary of external notes): Nursing Notes    CC/HPI Summary, DDx, ED Course, and Reassessment: Patient here with concerns of 1 day of pleuritic chest pain which started suddenly while at rest.  Patient has recent diagnosis of DVT started on Eliquis.  No specific cardiac history.  However, will evaluate with troponin.  Initial EKG was nonischemic.  CT pulmonary angiogram ordered for evaluation for pulmonary embolism.    ED Course as of 05/19/24 1429   Sun May 19, 2024   1056 EKG interpreted by me shows sinus rhythm with a rate of 88, normal intervals, normal axis, no acute ST segment or T wave changes. [DE]   1057 D-Dimer, Quantitative:    D-Dimer, Quant 0.43  D-dimer negative however.  Obtained additional history from patient and he is sufficiently high risk that D-dimer would not be sufficient to rule out pulmonary embolism and him.  CT pulmonary angiogram ordered. [DE]   1356 CBC notable for hemoglobin 10.1 which is significant improved from patient's baseline.  Sodium 135.  Renal function unremarkable.  Troponin initially 4.  Repeat troponin pending.  CT pulmonary angiogram negative for clot or any other acute pathology.  If repeat troponin is unremarkable patient will be likely okay for outpatient management. [DE]   1417 Repeat troponin is negative. [DE]   1419 Patient has heart score of 3.  Patient okay for outpatient management this time as clinically this does not sound like ACS to me. [DE]   1428 Patient feels much

## 2024-05-19 NOTE — ED TRIAGE NOTES
Patient c/o left sided rib pain, hurts upon inspiration onset last night. States that he is having SOB.     Able to speak in full sentences. Denies recent falls. Patient is bilateral BKA.

## 2024-05-19 NOTE — DISCHARGE INSTRUCTIONS
We performed a CT scan of your lungs to make sure that you did not have a blood clot.  At this time the CT scan shows that this is not happened.  The clot is likely still only in your left leg.  Please take your medications as prescribed.  If your symptoms get worse or do not improve please come back to the ER right away for a repeat evaluation.

## 2024-05-19 NOTE — ED NOTES
ACCEPTING INFORMATION:    April in [facility] [address]  DR. JEFE JAUREGUI   [phone]

## 2024-05-19 NOTE — ED NOTES
Patient resting on the stretcher at this time.     Chest rise and fall noted. VSS.     No further complaints at this time.     Patient instructed to utilize the \"call bell\" if any assistance is needed.

## 2024-05-23 LAB
EKG ATRIAL RATE: 88 BPM
EKG DIAGNOSIS: NORMAL
EKG P AXIS: 80 DEGREES
EKG P-R INTERVAL: 180 MS
EKG Q-T INTERVAL: 348 MS
EKG QRS DURATION: 78 MS
EKG QTC CALCULATION (BAZETT): 421 MS
EKG R AXIS: 67 DEGREES
EKG T AXIS: 72 DEGREES
EKG VENTRICULAR RATE: 88 BPM

## 2024-06-02 ENCOUNTER — HOSPITAL ENCOUNTER (EMERGENCY)
Facility: HOSPITAL | Age: 64
Discharge: HOME | End: 2024-06-02
Attending: EMERGENCY MEDICINE
Payer: MEDICAID

## 2024-06-02 VITALS
TEMPERATURE: 98 F | RESPIRATION RATE: 15 BRPM | WEIGHT: 165 LBS | BODY MASS INDEX: 23.68 KG/M2 | HEART RATE: 77 BPM | OXYGEN SATURATION: 98 % | DIASTOLIC BLOOD PRESSURE: 60 MMHG | SYSTOLIC BLOOD PRESSURE: 114 MMHG

## 2024-06-02 DIAGNOSIS — Z89.512 S/P BILATERAL BELOW KNEE AMPUTATION (HCC): ICD-10-CM

## 2024-06-02 DIAGNOSIS — Z89.511 S/P BILATERAL BELOW KNEE AMPUTATION (HCC): ICD-10-CM

## 2024-06-02 DIAGNOSIS — Z13.9 ENCOUNTER FOR MEDICAL SCREENING EXAMINATION: Primary | ICD-10-CM

## 2024-06-02 PROCEDURE — 99283 EMERGENCY DEPT VISIT LOW MDM: CPT

## 2024-06-02 NOTE — ED TRIAGE NOTES
Patient in ED via EMS for a fall while doing wheelies in his wheelchair. Patient denies hitting head or LOC.
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
## 2024-06-12 ENCOUNTER — HOSPITAL ENCOUNTER (EMERGENCY)
Facility: HOSPITAL | Age: 64
Discharge: HOME | End: 2024-06-12
Attending: EMERGENCY MEDICINE
Payer: MEDICAID

## 2024-06-12 ENCOUNTER — APPOINTMENT (OUTPATIENT)
Facility: HOSPITAL | Age: 64
End: 2024-06-12
Payer: MEDICAID

## 2024-06-12 VITALS
DIASTOLIC BLOOD PRESSURE: 64 MMHG | RESPIRATION RATE: 19 BRPM | SYSTOLIC BLOOD PRESSURE: 118 MMHG | TEMPERATURE: 97.7 F | OXYGEN SATURATION: 100 % | HEART RATE: 67 BPM

## 2024-06-12 DIAGNOSIS — S50.01XD CONTUSION OF RIGHT ELBOW, SUBSEQUENT ENCOUNTER: Primary | ICD-10-CM

## 2024-06-12 DIAGNOSIS — S50.02XD CONTUSION OF LEFT ELBOW, SUBSEQUENT ENCOUNTER: ICD-10-CM

## 2024-06-12 DIAGNOSIS — R29.6 FREQUENT FALLS: ICD-10-CM

## 2024-06-12 PROCEDURE — 73080 X-RAY EXAM OF ELBOW: CPT

## 2024-06-12 PROCEDURE — 99283 EMERGENCY DEPT VISIT LOW MDM: CPT

## 2024-06-12 ASSESSMENT — PAIN - FUNCTIONAL ASSESSMENT: PAIN_FUNCTIONAL_ASSESSMENT: 0-10

## 2024-06-12 ASSESSMENT — PAIN SCALES - GENERAL: PAINLEVEL_OUTOF10: 10

## 2024-06-12 NOTE — ED PROVIDER NOTES
current facility-administered medications for this encounter.     Current Outpatient Medications   Medication Sig Dispense Refill   • lidocaine (LIDODERM) 5 % Place 1 patch onto the skin daily 12 hours on, 12 hours off. 30 patch 0   • acetaminophen (TYLENOL) 325 MG tablet Take 650 mg by mouth every 4 hours as needed     • gabapentin (NEURONTIN) 300 MG capsule Take 300 mg by mouth 3 times daily.     • sertraline (ZOLOFT) 50 MG tablet Take 50 mg by mouth daily         Past History     Past Medical History:  Past Medical History:   Diagnosis Date   • Anxiety    • Depression    • Enlarged prostate    • PTSD (post-traumatic stress disorder)        Past Surgical History:  Past Surgical History:   Procedure Laterality Date   • ORTHOPEDIC SURGERY      double amputee       Family History:  No family history on file.    Social History:  Social History     Tobacco Use   • Smoking status: Every Day   Substance Use Topics   • Alcohol use: Yes   • Drug use: Yes     Types: Cocaine, Marijuana (Weed)       Allergies:  Allergies   Allergen Reactions   • Grass Pollen(K-O-R-T-Swt Jag) Itching   • Molds & Smuts Other (See Comments)     Runny nose, sneezing, itchy nose         Physical Exam     Vitals:    06/12/24 0204 06/12/24 0410 06/12/24 0658   BP: 128/82 122/74 118/64   Pulse: 87  67   Resp: 19  19   Temp: 97.7 °F (36.5 °C)     TempSrc: Oral     SpO2: 96%  100%     Physical Exam  Vitals and nursing note reviewed.   Constitutional:       Appearance: Normal appearance.   HENT:      Head: Normocephalic and atraumatic.      Ears:      Comments: No blood or fluid from ears or nose     Nose:      Comments: No blood or fluid from ears or nose     Mouth/Throat:      Mouth: Mucous membranes are moist.   Eyes:      Extraocular Movements: Extraocular movements intact.   Cardiovascular:      Rate and Rhythm: Normal rate.   Pulmonary:      Effort: Pulmonary effort is normal.   Abdominal:      Palpations: Abdomen is soft.      Tenderness: There  condition change. He has been provided with education for proper emergency department utilization.     CLINICAL IMPRESSION:    1. Contusion of right elbow, subsequent encounter    2. Contusion of left elbow, subsequent encounter    3. Frequent falls        PLAN:  1. D/C Home  2.      Medication List        ASK your doctor about these medications      acetaminophen 325 MG tablet  Commonly known as: TYLENOL     gabapentin 300 MG capsule  Commonly known as: NEURONTIN     lidocaine 5 %  Commonly known as: Lidoderm  Place 1 patch onto the skin daily 12 hours on, 12 hours off.     sertraline 50 MG tablet  Commonly known as: ZOLOFT            3. @[facility]@  _______________________________    This note was partially transcribed via voice recognition software. Although efforts have been made to catch any discrepancies, it may contain sound alike words, grammatical errors, or nonsensical words.             Wolf Glaser MD  06/16/24 9029

## 2024-06-22 ENCOUNTER — APPOINTMENT (OUTPATIENT)
Facility: HOSPITAL | Age: 64
End: 2024-06-22
Payer: MEDICAID

## 2024-06-22 ENCOUNTER — HOSPITAL ENCOUNTER (EMERGENCY)
Facility: HOSPITAL | Age: 64
Discharge: HOME | End: 2024-06-22
Attending: EMERGENCY MEDICINE
Payer: MEDICAID

## 2024-06-22 VITALS
HEIGHT: 70 IN | DIASTOLIC BLOOD PRESSURE: 70 MMHG | HEART RATE: 105 BPM | TEMPERATURE: 97.7 F | OXYGEN SATURATION: 97 % | SYSTOLIC BLOOD PRESSURE: 120 MMHG | RESPIRATION RATE: 16 BRPM | BODY MASS INDEX: 23.68 KG/M2

## 2024-06-22 DIAGNOSIS — R29.6 FREQUENT FALLS: ICD-10-CM

## 2024-06-22 DIAGNOSIS — M25.521 RIGHT ELBOW PAIN: Primary | ICD-10-CM

## 2024-06-22 DIAGNOSIS — R52: ICD-10-CM

## 2024-06-22 PROCEDURE — 96372 THER/PROPH/DIAG INJ SC/IM: CPT

## 2024-06-22 PROCEDURE — 73080 X-RAY EXAM OF ELBOW: CPT

## 2024-06-22 PROCEDURE — 99284 EMERGENCY DEPT VISIT MOD MDM: CPT

## 2024-06-22 PROCEDURE — 6360000002 HC RX W HCPCS: Performed by: EMERGENCY MEDICINE

## 2024-06-22 RX ORDER — KETOROLAC TROMETHAMINE 15 MG/ML
15 INJECTION, SOLUTION INTRAMUSCULAR; INTRAVENOUS
Status: COMPLETED | OUTPATIENT
Start: 2024-06-22 | End: 2024-06-22

## 2024-06-22 RX ADMIN — KETOROLAC TROMETHAMINE 15 MG: 15 INJECTION, SOLUTION INTRAMUSCULAR; INTRAVENOUS at 07:11

## 2024-06-22 NOTE — ED PROVIDER NOTES
EMERGENCY DEPARTMENT HISTORY AND PHYSICAL EXAM    Date: 6/22/2024  Patient Name: Lamont Naranjo    History of Presenting Illness     Chief Complaint   Patient presents with    Joint Swelling     Pt BIBA c/o L Elbow pain s/p falling while \"popping wheelies\" from his WC. Pt has +ROM to L Elbow with no obvious deformities present. Pt denies head injury and denies LOC.         History Provided By: Patient and EMS    Additional History (Context):   5:32 AM EDT  Lamont Naranjo is a 63 y.o. male with PMHX of bilateral lower extremity amputations with chronic phantom pain, DVT of the left popliteal vein, missing finger digits, anxiety, PTSD, olecranon, polysubstance abuse who presents to the emergency department C/O injuries from falling wheelies in his wheelchair.  Again.  63-year-old gentleman well-known to our department frequently comes in for generalized body aches and soreness.  Recently he has been coming in complaining of injuries to his knees or elbows after falling while he is in his wheelchair.  Tonight the story is the same.    Social History  Often drinks alcohol as well as tobacco use.  There is past history of marijuana and cocaine abuse last documented May 2022.  Paramedics were called to assist gentleman outside a convenience store in his wheelchair.     Family History  Problem Relation Age of Onset  Glaucoma Mother  Stroke Mother  Diabetes Father  Alcohol abuse Father  Heart attack Father  Social History  Often drinks alcohol as well as      PCP: Unknown, Provider, APRN - NP    No current facility-administered medications for this encounter.     Current Outpatient Medications   Medication Sig Dispense Refill    lidocaine (LIDODERM) 5 % Place 1 patch onto the skin daily 12 hours on, 12 hours off. 30 patch 0    acetaminophen (TYLENOL) 325 MG tablet Take 650 mg by mouth every 4 hours as needed      gabapentin (NEURONTIN) 300 MG capsule Take 300 mg by mouth 3 times daily.      sertraline (ZOLOFT) 50 MG  diagnosis and treatment, and list of reasons why they would want to return to the ED prior to their follow-up appointment, should his condition change. He has been provided with education for proper emergency department utilization.     CLINICAL IMPRESSION:    1. Right elbow pain    2. Daily pain    3. Frequent falls        PLAN:  1. D/C Home  2.      Medication List        ASK your doctor about these medications      acetaminophen 325 MG tablet  Commonly known as: TYLENOL     gabapentin 300 MG capsule  Commonly known as: NEURONTIN     lidocaine 5 %  Commonly known as: Lidoderm  Place 1 patch onto the skin daily 12 hours on, 12 hours off.     sertraline 50 MG tablet  Commonly known as: ZOLOFT            3. @[facility]  _______________________________    This note was partially transcribed via voice recognition software. Although efforts have been made to catch any discrepancies, it may contain sound alike words, grammatical errors, or nonsensical words.

## 2024-06-22 NOTE — ED TRIAGE NOTES
Pt BIBA c/o L Elbow pain s/p falling while \"popping wheelies\" from his WC. Pt has +ROM to L Elbow with no obvious deformities present. Pt denies head injury and denies LOC.

## 2024-06-22 NOTE — ED NOTES
Medication given prior to discharge.     Paperwork read with patient making note of where to  prescriptions (if applicable).     IV taken out (if applicable).     Armband removed and disposed of in shredder.     Patient has no further questions at this time.     Will discharge from system.

## 2024-06-24 ENCOUNTER — APPOINTMENT (OUTPATIENT)
Facility: HOSPITAL | Age: 64
End: 2024-06-24

## 2024-06-24 ENCOUNTER — HOSPITAL ENCOUNTER (EMERGENCY)
Facility: HOSPITAL | Age: 64
Discharge: HOME | End: 2024-06-24
Attending: EMERGENCY MEDICINE

## 2024-06-24 VITALS
DIASTOLIC BLOOD PRESSURE: 67 MMHG | RESPIRATION RATE: 16 BRPM | HEART RATE: 88 BPM | OXYGEN SATURATION: 95 % | TEMPERATURE: 98.4 F | SYSTOLIC BLOOD PRESSURE: 102 MMHG

## 2024-06-24 DIAGNOSIS — R07.89 CHEST WALL PAIN: Primary | ICD-10-CM

## 2024-06-24 DIAGNOSIS — T87.89: ICD-10-CM

## 2024-06-24 DIAGNOSIS — R52 GENERALIZED BODY ACHES: ICD-10-CM

## 2024-06-24 DIAGNOSIS — M79.605: ICD-10-CM

## 2024-06-24 DIAGNOSIS — M79.604: ICD-10-CM

## 2024-06-24 PROCEDURE — 96372 THER/PROPH/DIAG INJ SC/IM: CPT

## 2024-06-24 PROCEDURE — 6370000000 HC RX 637 (ALT 250 FOR IP): Performed by: EMERGENCY MEDICINE

## 2024-06-24 PROCEDURE — 6360000002 HC RX W HCPCS: Performed by: EMERGENCY MEDICINE

## 2024-06-24 PROCEDURE — 70360 X-RAY EXAM OF NECK: CPT

## 2024-06-24 PROCEDURE — 99284 EMERGENCY DEPT VISIT MOD MDM: CPT

## 2024-06-24 RX ORDER — GABAPENTIN 300 MG/1
300 CAPSULE ORAL 3 TIMES DAILY
Qty: 42 CAPSULE | Refills: 0 | Status: SHIPPED | OUTPATIENT
Start: 2024-06-24 | End: 2024-07-08

## 2024-06-24 RX ORDER — KETOROLAC TROMETHAMINE 10 MG/1
10 TABLET, FILM COATED ORAL EVERY 6 HOURS PRN
Qty: 20 TABLET | Refills: 0 | Status: SHIPPED | OUTPATIENT
Start: 2024-06-24

## 2024-06-24 RX ORDER — GABAPENTIN 300 MG/1
300 CAPSULE ORAL
Status: COMPLETED | OUTPATIENT
Start: 2024-06-24 | End: 2024-06-24

## 2024-06-24 RX ORDER — LIDOCAINE 4 G/G
1 PATCH TOPICAL
Status: DISCONTINUED | OUTPATIENT
Start: 2024-06-24 | End: 2024-06-24 | Stop reason: HOSPADM

## 2024-06-24 RX ORDER — KETOROLAC TROMETHAMINE 15 MG/ML
15 INJECTION, SOLUTION INTRAMUSCULAR; INTRAVENOUS
Status: COMPLETED | OUTPATIENT
Start: 2024-06-24 | End: 2024-06-24

## 2024-06-24 RX ADMIN — KETOROLAC TROMETHAMINE 15 MG: 15 INJECTION, SOLUTION INTRAMUSCULAR; INTRAVENOUS at 01:23

## 2024-06-24 RX ADMIN — GABAPENTIN 300 MG: 300 CAPSULE ORAL at 01:23

## 2024-06-24 ASSESSMENT — PAIN DESCRIPTION - ORIENTATION: ORIENTATION: LEFT

## 2024-06-24 ASSESSMENT — PAIN SCALES - GENERAL: PAINLEVEL_OUTOF10: 10

## 2024-06-24 ASSESSMENT — PAIN DESCRIPTION - LOCATION: LOCATION: NECK

## 2024-06-24 ASSESSMENT — PAIN - FUNCTIONAL ASSESSMENT: PAIN_FUNCTIONAL_ASSESSMENT: 0-10

## 2024-06-24 NOTE — DISCHARGE INSTRUCTIONS
Alternate between Tylenol 1 g and oral Toradol prescribed versus ibuprofen 800 mg 3 times per day.  Can also apply lidocaine patch  and/or Voltaren gel to sore muscles, limbs try warm compress to sore muscles.
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
## 2024-06-24 NOTE — ED PROVIDER NOTES
patients presenting problems have been discussed, and the staff are in agreement with the care plan formulated and outlined with them.  I have encouraged them to ask questions as they arise throughout their visit.    Vital Signs-Reviewed the patient's vital signs.    Patient Vitals for the past 12 hrs:   Temp Pulse Resp BP SpO2   06/24/24 0046 -- -- -- 102/67 95 %   06/24/24 0043 98.4 °F (36.9 °C) 88 16 117/72 96 %         Provider Notes (Medical Decision Making):        Clinical presentation most consistent with simple chronic pain, fibromyalgia, peripheral neuropathy of lower extremities, also consider malingering, chest wall pain.  Patient appear to be to be here due toinability to  his gabapentin and Lidoderm patch prescriptions today.  Will give dose of gabapentin 3 mg based on chart review as well as Salonpas patch, intramuscular Toradol shot 15 mg.  See no indication to repeat imaging of the upper or lower extremities.  No signs of head or facial trauma, complaint of loss of consciousness, headache to warrant CT imaging of the brain.    Documentation/Prior Results Review:  Nursing notes    Social Determinants of Health: Concern for housing instability      The patient will be discharged. The patient was reassured that these symptoms do not appear to represent a serious or life threatening condition at this time. Warning signs of worsening condition were discusses and understood by the patient. Based on patient's age, coexisting illness, exam and the results of this ED evaluation, the decision to treat as an outpatient was made. Based on the information available at time of discharge, acute pathology requiring immediate intervention was deemed relative unlikely. While it is impossible to completely exclude the possibility of underlying serious disease or worsening condition, I feel the relative likelihood is extremely low. I discussed this uncertainty with the patient and/or family member/caregiver, who

## 2024-06-28 ENCOUNTER — HOSPITAL ENCOUNTER (EMERGENCY)
Facility: HOSPITAL | Age: 64
Discharge: HOME | End: 2024-06-28
Attending: EMERGENCY MEDICINE
Payer: MEDICAID

## 2024-06-28 VITALS
SYSTOLIC BLOOD PRESSURE: 138 MMHG | RESPIRATION RATE: 16 BRPM | HEART RATE: 87 BPM | DIASTOLIC BLOOD PRESSURE: 71 MMHG | TEMPERATURE: 98.1 F | OXYGEN SATURATION: 100 %

## 2024-06-28 DIAGNOSIS — L84 CALLUS OF HAND: ICD-10-CM

## 2024-06-28 DIAGNOSIS — G89.4 CHRONIC PAIN SYNDROME: Primary | ICD-10-CM

## 2024-06-28 PROCEDURE — 99284 EMERGENCY DEPT VISIT MOD MDM: CPT

## 2024-06-28 PROCEDURE — 96372 THER/PROPH/DIAG INJ SC/IM: CPT

## 2024-06-28 PROCEDURE — 6360000002 HC RX W HCPCS: Performed by: EMERGENCY MEDICINE

## 2024-06-28 RX ORDER — KETOROLAC TROMETHAMINE 15 MG/ML
15 INJECTION, SOLUTION INTRAMUSCULAR; INTRAVENOUS ONCE
Status: COMPLETED | OUTPATIENT
Start: 2024-06-28 | End: 2024-06-28

## 2024-06-28 RX ADMIN — KETOROLAC TROMETHAMINE 15 MG: 15 INJECTION, SOLUTION INTRAMUSCULAR; INTRAVENOUS at 03:39

## 2024-06-28 ASSESSMENT — PAIN SCALES - GENERAL: PAINLEVEL_OUTOF10: 10

## 2024-06-29 NOTE — ED PROVIDER NOTES
10 MG tablet Take 1 tablet by mouth every 6 hours as needed for Pain 20 tablet 0    diclofenac sodium (VOLTAREN) 1 % GEL Apply 2 g topically 4 times daily as needed for Pain 100 g 1    lidocaine (LIDODERM) 5 % Place 1 patch onto the skin daily 12 hours on, 12 hours off. 30 patch 0    acetaminophen (TYLENOL) 325 MG tablet Take 650 mg by mouth every 4 hours as needed      gabapentin (NEURONTIN) 300 MG capsule Take 300 mg by mouth 3 times daily.      sertraline (ZOLOFT) 50 MG tablet Take 50 mg by mouth daily         Past History     Past Medical History:  Past Medical History:   Diagnosis Date    Anxiety     Depression     Enlarged prostate     PTSD (post-traumatic stress disorder)        Past Surgical History:  Past Surgical History:   Procedure Laterality Date    ORTHOPEDIC SURGERY      double amputee       Family History:  No family history on file.    Social History:  Social History     Tobacco Use    Smoking status: Every Day   Substance Use Topics    Alcohol use: Yes    Drug use: Yes     Types: Cocaine, Marijuana (Weed)       Allergies:  Allergies   Allergen Reactions    Grass Pollen(K-O-R-T-Swt Jag) Itching    Molds & Smuts Other (See Comments)     Runny nose, sneezing, itchy nose         Physical Exam     Vitals:    06/28/24 0253   BP: 138/71   Pulse: 87   Resp: 16   Temp: 98.1 °F (36.7 °C)   TempSrc: Oral   SpO2: 100%     Physical Exam  Vitals and nursing note reviewed.   Constitutional:       General: He is awake.      Appearance: Normal appearance.      Comments: He arrived cursing at the paramedics   HENT:      Head: Normocephalic and atraumatic.      Ears:      Comments: No blood or fluid from ears or nose     Nose:      Comments: No blood or fluid from ears or nose     Mouth/Throat:      Mouth: Mucous membranes are moist.   Eyes:      Extraocular Movements: Extraocular movements intact.   Cardiovascular:      Rate and Rhythm: Normal rate.   Pulmonary:      Effort: Pulmonary effort is normal.   Abdominal:

## 2024-07-06 ENCOUNTER — HOSPITAL ENCOUNTER (EMERGENCY)
Facility: HOSPITAL | Age: 64
Discharge: HOME | End: 2024-07-06
Attending: EMERGENCY MEDICINE
Payer: MEDICAID

## 2024-07-06 VITALS
SYSTOLIC BLOOD PRESSURE: 108 MMHG | DIASTOLIC BLOOD PRESSURE: 75 MMHG | RESPIRATION RATE: 16 BRPM | OXYGEN SATURATION: 99 % | HEART RATE: 77 BPM | TEMPERATURE: 98.6 F

## 2024-07-06 DIAGNOSIS — M70.32 BURSITIS OF BOTH ELBOWS, UNSPECIFIED BURSA: Primary | ICD-10-CM

## 2024-07-06 DIAGNOSIS — M70.31 BURSITIS OF BOTH ELBOWS, UNSPECIFIED BURSA: Primary | ICD-10-CM

## 2024-07-06 PROCEDURE — 99284 EMERGENCY DEPT VISIT MOD MDM: CPT

## 2024-07-06 PROCEDURE — 6360000002 HC RX W HCPCS: Performed by: EMERGENCY MEDICINE

## 2024-07-06 PROCEDURE — 96372 THER/PROPH/DIAG INJ SC/IM: CPT

## 2024-07-06 RX ORDER — KETOROLAC TROMETHAMINE 10 MG/1
10 TABLET, FILM COATED ORAL EVERY 6 HOURS PRN
Qty: 20 TABLET | Refills: 0 | Status: SHIPPED | OUTPATIENT
Start: 2024-07-06

## 2024-07-06 RX ORDER — KETOROLAC TROMETHAMINE 15 MG/ML
15 INJECTION, SOLUTION INTRAMUSCULAR; INTRAVENOUS ONCE
Status: COMPLETED | OUTPATIENT
Start: 2024-07-06 | End: 2024-07-06

## 2024-07-06 RX ORDER — LIDOCAINE 4 G/G
1 PATCH TOPICAL
Status: DISCONTINUED | OUTPATIENT
Start: 2024-07-06 | End: 2024-07-06 | Stop reason: HOSPADM

## 2024-07-06 RX ORDER — IBUPROFEN 400 MG/1
800 TABLET ORAL
Status: DISCONTINUED | OUTPATIENT
Start: 2024-07-06 | End: 2024-07-06 | Stop reason: HOSPADM

## 2024-07-06 RX ADMIN — KETOROLAC TROMETHAMINE 15 MG: 15 INJECTION, SOLUTION INTRAMUSCULAR; INTRAVENOUS at 02:25

## 2024-07-06 ASSESSMENT — PAIN SCALES - GENERAL: PAINLEVEL_OUTOF10: 4

## 2024-07-06 NOTE — ED TRIAGE NOTES
Pt BIBA c/c elbow pain after falling out of wc while doing a wheelie. Scabs noted to posterior elbows; no bleeding noted.     Pt A&Ox4, RR E/U, NAD noted at this time.

## 2024-07-06 NOTE — ED PROVIDER NOTES
EMERGENCY DEPARTMENT HISTORY AND PHYSICAL EXAM      Date: 7/6/2024  Patient Name: Lamont Naranjo      History of Presenting Illness     Chief Complaint   Patient presents with    Elbow Pain       Location/Duration/Severity/Modifying factors   Chief Complaint   Patient presents with    Elbow Pain       HPI:  Lamont Naranjo is a 63 y.o. male with PMH significant for Anxiety, Depression, BPH presents with bilateral elbow pain.  States he fell off of his wheelchair while performing wheelies tonight. Pt  denies pain to other areas including his head, neck. Treatments attempted at home include none.  Patient comes in by ambulance.    PCP: Unknown, Provider, APRN - NP    Current Facility-Administered Medications   Medication Dose Route Frequency Provider Last Rate Last Admin    ibuprofen (ADVIL;MOTRIN) tablet 800 mg  800 mg Oral NOW Huang Machado,         lidocaine 4 % external patch 1 patch  1 patch TransDERmal NOW Huang Machado, DO         Current Outpatient Medications   Medication Sig Dispense Refill    ketorolac (TORADOL) 10 MG tablet Take 1 tablet by mouth every 6 hours as needed for Pain 20 tablet 0    gabapentin (NEURONTIN) 300 MG capsule Take 1 capsule by mouth 3 times daily for 14 days. Max Daily Amount: 900 mg 42 capsule 0    ketorolac (TORADOL) 10 MG tablet Take 1 tablet by mouth every 6 hours as needed for Pain 20 tablet 0    diclofenac sodium (VOLTAREN) 1 % GEL Apply 2 g topically 4 times daily as needed for Pain 100 g 1    lidocaine (LIDODERM) 5 % Place 1 patch onto the skin daily 12 hours on, 12 hours off. 30 patch 0    acetaminophen (TYLENOL) 325 MG tablet Take 650 mg by mouth every 4 hours as needed      gabapentin (NEURONTIN) 300 MG capsule Take 300 mg by mouth 3 times daily.      sertraline (ZOLOFT) 50 MG tablet Take 50 mg by mouth daily         Past History     Past Medical History:  Past Medical History:   Diagnosis Date    Anxiety     Depression     Enlarged prostate     PTSD  (post-traumatic stress disorder)        Past Surgical History:  Past Surgical History:   Procedure Laterality Date    ORTHOPEDIC SURGERY      double amputee       Family History:  History reviewed. No pertinent family history.    Social History:  Social History     Tobacco Use    Smoking status: Every Day   Substance Use Topics    Alcohol use: Yes    Drug use: Yes     Types: Cocaine, Marijuana (Weed)       Allergies:  Allergies   Allergen Reactions    Grass Pollen(K-O-R-T-Swt Jag) Itching    Molds & Smuts Other (See Comments)     Runny nose, sneezing, itchy nose         Physical Exam     General: Patient is asleep but easily arousable.  Skin: No visible rashes, ecchymosis to the bilateral elbows, forearms.  MSK: Bilateral olecranon tender soft fluid collections likely consistent with bursitis, left greater than right.  No overlying erythema or induration.  Neuro: The patient is alert and oriented.        Lab and Diagnostic Study Results     Labs -  No results found for this or any previous visit (from the past 24 hour(s)).    Radiologic Studies -   No orders to display         Procedures and Critical Care     Performed by: VINCE NG, DO    Procedures     VINCE NG, DO    Medical Decision Making and ED Course   - I am the first and primary provider for this patient AND AM THE PRIMARY PROVIDER OF RECORD.    - I reviewed the vital signs, available nursing notes, past medical history, past surgical history, family history and social history.    - Initial assessment performed. The patients presenting problems have been discussed, and the staff are in agreement with the care plan formulated and outlined with them.  I have encouraged them to ask questions as they arise throughout their visit.    Vital Signs-Reviewed the patient's vital signs.    Patient Vitals for the past 12 hrs:   Temp Pulse Resp BP SpO2   07/06/24 0207 98.6 °F (37 °C) 77 16 -- 99 %   07/06/24 0200 -- -- -- 108/75 --         Provider Notes

## 2024-07-21 ENCOUNTER — HOSPITAL ENCOUNTER (EMERGENCY)
Facility: HOSPITAL | Age: 64
Discharge: HOME | End: 2024-07-21
Attending: EMERGENCY MEDICINE
Payer: MEDICAID

## 2024-07-21 VITALS
SYSTOLIC BLOOD PRESSURE: 131 MMHG | TEMPERATURE: 97.7 F | RESPIRATION RATE: 18 BRPM | HEART RATE: 73 BPM | OXYGEN SATURATION: 100 % | DIASTOLIC BLOOD PRESSURE: 91 MMHG

## 2024-07-21 DIAGNOSIS — M70.21 OLECRANON BURSITIS OF BOTH ELBOWS: Primary | ICD-10-CM

## 2024-07-21 DIAGNOSIS — Z79.01 ENCOUNTER FOR CURRENT LONG-TERM USE OF ANTICOAGULANTS: ICD-10-CM

## 2024-07-21 DIAGNOSIS — M70.22 OLECRANON BURSITIS OF BOTH ELBOWS: Primary | ICD-10-CM

## 2024-07-21 DIAGNOSIS — Z86.718 HISTORY OF DEEP VENOUS THROMBOSIS (DVT) OF DISTAL VEIN OF LEFT LOWER EXTREMITY: ICD-10-CM

## 2024-07-21 PROCEDURE — 6360000002 HC RX W HCPCS: Performed by: EMERGENCY MEDICINE

## 2024-07-21 PROCEDURE — 99283 EMERGENCY DEPT VISIT LOW MDM: CPT

## 2024-07-21 PROCEDURE — 6370000000 HC RX 637 (ALT 250 FOR IP): Performed by: EMERGENCY MEDICINE

## 2024-07-21 RX ORDER — DEXAMETHASONE 4 MG/1
4 TABLET ORAL
Status: COMPLETED | OUTPATIENT
Start: 2024-07-21 | End: 2024-07-21

## 2024-07-21 RX ADMIN — DEXAMETHASONE 4 MG: 4 TABLET ORAL at 04:34

## 2024-07-21 RX ADMIN — APIXABAN 5 MG: 5 TABLET, FILM COATED ORAL at 04:35

## 2024-07-21 NOTE — ED TRIAGE NOTES
Pt BIBEMS with c/o bilateral elbow pain r/t a fall from w/c s/p \"poppin' wheelies.\" Per EMS, pt did not hit head; no LOC. Pt A&Ox4 in triage.

## 2024-07-21 NOTE — ED PROVIDER NOTES
EMERGENCY DEPARTMENT HISTORY AND PHYSICAL EXAM    Date: 7/21/2024  Patient Name: Lamont Naranjo    History of Presenting Illness     Chief Complaint   Patient presents with    Joint Swelling         History Provided By: Patient and EMS    Additional History (Context):   4:09 AM EDT  Lamont Naranjo is a 63 y.o. male with PMHX of multiple amputations, lower extremity DVT anxiety, depression, PTSD, malingering who presents to the emergency department C/O elbow pain.  Patient was brought in by EMS (again) after reportedly hurting his elbows when doing wheelies in his wheelchair.  This emergency department sees Mr. Lamont Naranjo multiple times for the same complaint frequently being brought here in the middle of the night from the indoor dining room for various fast food establishments.  Tonight paramedics report no evidence of new falls or injuries.  He was transported BLS and placed in bed 10 without event.  He also reports to me tonight he is has not had his Eliquis recently for his documented peroneal vein DVT.  He reports running out of his prescription.    Social History  History of polysubstance abuse including cocaine and marijuana.  Past history of alcohol use.  Past Surgical History:   Procedure Laterality Date   AMPUTATION Right 9/12/2017   Procedure: PARTIAL AMPUTATION EVERY FINGER ON THE RIGHT HAND; Surgeon: Leonardo Spaulding MD   AMPUTATION Left 9/19/2017   Procedure: PARTIAL AMPUTATION LEFT INDEX, LONG , RING, AND LITTLE FINGERS; Surgeon: Leonardo Spaulding MD   AMPUTATION INITIAL/ PRIMARY Bilateral 8/29/2017   Procedure: AMPUTATION LEG THROUGH TIBIA AND FIBULA; Surgeon: Norm Morillo MD   BRONCHOSCOPY N/A 7/27/2017   Procedure: BRONCHOSCOPY- BEDSIDE CASE; Surgeon: Francisco Chu MD   COLONOSCOPY N/A 3/19/2019   Procedure: COLONOSCOPY; Surgeon: Jose Meyers MD   DEBRIDEMENT CHEST WALL AND RIB CAGE FOR INJURY Right 2013   HX BRONCHOSCOPY N/A 6/26/2014   Procedure:

## 2024-08-01 ENCOUNTER — HOSPITAL ENCOUNTER (EMERGENCY)
Facility: HOSPITAL | Age: 64
Discharge: HOME | End: 2024-08-01
Attending: EMERGENCY MEDICINE
Payer: MEDICARE

## 2024-08-01 ENCOUNTER — APPOINTMENT (OUTPATIENT)
Facility: HOSPITAL | Age: 64
End: 2024-08-01
Payer: MEDICARE

## 2024-08-01 VITALS
BODY MASS INDEX: 21.47 KG/M2 | TEMPERATURE: 97.2 F | DIASTOLIC BLOOD PRESSURE: 71 MMHG | HEIGHT: 70 IN | SYSTOLIC BLOOD PRESSURE: 115 MMHG | OXYGEN SATURATION: 99 % | HEART RATE: 99 BPM | WEIGHT: 150 LBS | RESPIRATION RATE: 16 BRPM

## 2024-08-01 DIAGNOSIS — M25.561 ACUTE PAIN OF RIGHT KNEE: Primary | ICD-10-CM

## 2024-08-01 PROCEDURE — 73562 X-RAY EXAM OF KNEE 3: CPT

## 2024-08-01 PROCEDURE — 99283 EMERGENCY DEPT VISIT LOW MDM: CPT

## 2024-08-01 ASSESSMENT — PAIN SCALES - GENERAL: PAINLEVEL_OUTOF10: 10

## 2024-08-01 ASSESSMENT — PAIN - FUNCTIONAL ASSESSMENT: PAIN_FUNCTIONAL_ASSESSMENT: 0-10
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
## 2024-08-01 NOTE — ED PROVIDER NOTES
[facility] EMERGENCY DEPT  EMERGENCY DEPARTMENT ENCOUNTER    Patient Name: Lamont Naranjo  MRN: [id number]  YOB: 1960  Provider: Kike Begum MD  PCP: Unknown, Provider, APRN - NP   Time/Date of evaluation: 2:01 AM EDT on 8/1/24    History of Presenting Illness     History Provided by: Patient  History is limited by: Patient is a vague historian    HISTORY:   Lamont Naranjo is a 63 y.o. male complains of multiple joint pain and swelling after multiple falls out of his wheelchair while doing wheelies.  He has presented to the emergency room at all times for the same complaint.  He does have a history of DVTs but is not compliant with his Eliquis.  He was seen for the same complaint yesterday at [facility] and had x-rays of his bilateral elbows which were negative for trauma.  He denies any recent head injuries.    Nursing Notes were all reviewed and agreed with or any disagreements were addressed in the HPI.    Past History     PAST MEDICAL HISTORY:  Past Medical History:   Diagnosis Date    Anxiety     Depression     Enlarged prostate     PTSD (post-traumatic stress disorder)        PAST SURGICAL HISTORY:  Past Surgical History:   Procedure Laterality Date    ORTHOPEDIC SURGERY      double amputee       FAMILY HISTORY:  No family history on file.    SOCIAL HISTORY:  Social History     Tobacco Use    Smoking status: Every Day   Substance Use Topics    Alcohol use: Yes    Drug use: Yes     Types: Cocaine, Marijuana (Weed)       MEDICATIONS:  No current facility-administered medications for this encounter.     Current Outpatient Medications   Medication Sig Dispense Refill    apixaban (ELIQUIS) 5 MG TABS tablet Take 1 tablet by mouth 2 times daily 60 tablet 0    apixaban (ELIQUIS) 5 MG TABS tablet Take 1 tablet by mouth 2 times daily      ketorolac (TORADOL) 10 MG tablet Take 1 tablet by mouth every 6 hours as needed for Pain 20 tablet 0    gabapentin (NEURONTIN) 300 MG capsule Take 1

## 2024-08-01 NOTE — ED TRIAGE NOTES
Pt c/o right knee and bilateral elbow swelling and pain after fall. Pt sts he fell today popping wheelies in his wheelchair. Denies hitting head and LOC. Prescribed thinner but is not taking them.

## 2024-08-19 ENCOUNTER — HOSPITAL ENCOUNTER (EMERGENCY)
Facility: HOSPITAL | Age: 64
Discharge: HOME | End: 2024-08-20
Attending: STUDENT IN AN ORGANIZED HEALTH CARE EDUCATION/TRAINING PROGRAM
Payer: MEDICARE

## 2024-08-19 DIAGNOSIS — M25.522 PAIN OF BOTH ELBOWS: Primary | ICD-10-CM

## 2024-08-19 DIAGNOSIS — M25.521 PAIN OF BOTH ELBOWS: Primary | ICD-10-CM

## 2024-08-19 PROCEDURE — 99283 EMERGENCY DEPT VISIT LOW MDM: CPT

## 2024-08-19 ASSESSMENT — PAIN - FUNCTIONAL ASSESSMENT: PAIN_FUNCTIONAL_ASSESSMENT: 0-10

## 2024-08-19 ASSESSMENT — PAIN SCALES - GENERAL: PAINLEVEL_OUTOF10: 10

## 2024-08-20 ENCOUNTER — APPOINTMENT (OUTPATIENT)
Facility: HOSPITAL | Age: 64
End: 2024-08-20
Payer: MEDICARE

## 2024-08-20 VITALS
WEIGHT: 155 LBS | DIASTOLIC BLOOD PRESSURE: 90 MMHG | OXYGEN SATURATION: 98 % | TEMPERATURE: 97.8 F | SYSTOLIC BLOOD PRESSURE: 146 MMHG | RESPIRATION RATE: 20 BRPM | BODY MASS INDEX: 22.24 KG/M2 | HEART RATE: 77 BPM

## 2024-08-20 PROCEDURE — 73070 X-RAY EXAM OF ELBOW: CPT

## 2024-08-20 ASSESSMENT — ENCOUNTER SYMPTOMS
CHEST TIGHTNESS: 0
PHOTOPHOBIA: 0
ABDOMINAL PAIN: 0
SHORTNESS OF BREATH: 0

## 2024-08-20 NOTE — ED NOTES
Per patient request, patient was given new paper scrubs and warm wipes to get cleaned up and patient's food was warmed up.  Patient is resting in ED stretcher in [facility].

## 2024-08-20 NOTE — ED PROVIDER NOTES
EMERGENCY DEPARTMENT HISTORY AND PHYSICAL EXAM      Date: 8/19/2024  Patient Name: Lamont Naranjo    History of Presenting Illness     Chief Complaint   Patient presents with    Fall     Pt BIBA for B/L elbow pain s/p falling off his WC while \"popping wheelies\". No obvious deformities or bleeding noted. Pt states he drank \"a little bit\" of Alcohol tonight.       64-year-old male presenting to the emergency department for evaluation of bilateral elbow pain after fall from his wheelchair.  States that he was \"popping\".  No head trauma.  No loss of consciousness.            PCP: Unknown, Provider, APRN - NP    No current facility-administered medications for this encounter.     Current Outpatient Medications   Medication Sig Dispense Refill    apixaban (ELIQUIS) 5 MG TABS tablet Take 1 tablet by mouth 2 times daily 60 tablet 0    apixaban (ELIQUIS) 5 MG TABS tablet Take 1 tablet by mouth 2 times daily      ketorolac (TORADOL) 10 MG tablet Take 1 tablet by mouth every 6 hours as needed for Pain 20 tablet 0    gabapentin (NEURONTIN) 300 MG capsule Take 1 capsule by mouth 3 times daily for 14 days. Max Daily Amount: 900 mg 42 capsule 0    ketorolac (TORADOL) 10 MG tablet Take 1 tablet by mouth every 6 hours as needed for Pain 20 tablet 0    diclofenac sodium (VOLTAREN) 1 % GEL Apply 2 g topically 4 times daily as needed for Pain 100 g 1    lidocaine (LIDODERM) 5 % Place 1 patch onto the skin daily 12 hours on, 12 hours off. 30 patch 0    acetaminophen (TYLENOL) 325 MG tablet Take 650 mg by mouth every 4 hours as needed      gabapentin (NEURONTIN) 300 MG capsule Take 300 mg by mouth 3 times daily.      sertraline (ZOLOFT) 50 MG tablet Take 50 mg by mouth daily         Past History     Past Medical History:  Past Medical History:   Diagnosis Date    Anxiety     Depression     Enlarged prostate     PTSD (post-traumatic stress disorder)        Past Surgical History:  Past Surgical History:   Procedure Laterality Date

## 2024-08-20 NOTE — ED TRIAGE NOTES
Pt BIBA for B/L elbow pain s/p falling off his WC while \"popping wheelies\". No obvious deformities or bleeding noted. Pt states he drank \"a little bit\" of Alcohol tonight.

## 2024-08-24 VITALS
WEIGHT: 155 LBS | SYSTOLIC BLOOD PRESSURE: 120 MMHG | TEMPERATURE: 97 F | HEIGHT: 70 IN | RESPIRATION RATE: 18 BRPM | BODY MASS INDEX: 22.19 KG/M2 | HEART RATE: 97 BPM | OXYGEN SATURATION: 97 % | DIASTOLIC BLOOD PRESSURE: 74 MMHG

## 2024-08-24 PROCEDURE — 99283 EMERGENCY DEPT VISIT LOW MDM: CPT

## 2024-08-25 ENCOUNTER — HOSPITAL ENCOUNTER (EMERGENCY)
Facility: HOSPITAL | Age: 64
Discharge: HOME | End: 2024-08-25
Attending: EMERGENCY MEDICINE
Payer: MEDICARE

## 2024-08-25 ENCOUNTER — APPOINTMENT (OUTPATIENT)
Facility: HOSPITAL | Age: 64
End: 2024-08-25
Payer: MEDICARE

## 2024-08-25 DIAGNOSIS — S50.02XA CONTUSION OF LEFT ELBOW, INITIAL ENCOUNTER: Primary | ICD-10-CM

## 2024-08-25 DIAGNOSIS — S50.01XA CONTUSION OF RIGHT ELBOW, INITIAL ENCOUNTER: ICD-10-CM

## 2024-08-25 PROCEDURE — 73080 X-RAY EXAM OF ELBOW: CPT

## 2024-08-25 NOTE — DISCHARGE INSTRUCTIONS
Thank you for choosing [facility]'s Emergency Department for your care. It is our privilege to provide excellent care for you in your time of need. In the next several days, you may receive a survey via mail or email about your experience with our team. We would appreciate you taking a few minutes to complete the survey, as we use this information to learn what we have done well and what we could be doing better. Thank you for trusting us with your care.    -----------------------------------------------------------------------------  Below you will find a list of your tests from today's visit.   Labs  No results found for this or any previous visit (from the past 12 hour(s)).     Radiologic Studies  XR ELBOW LEFT (MIN 3 VIEWS)   Final Result   No acute fracture.         Electronically signed by Andres Renae      XR ELBOW RIGHT (MIN 3 VIEWS)   Final Result   No acute fracture.         Electronically signed by Andres Renae        -----------------------------------------------------------------------------  The evaluation and treatment you received in the Emergency Department were for an urgent problem. It is important that you follow-up with a doctor, nurse practitioner, or physician assistant to: 1. confirm your diagnosis, 2. re-evaluate changes in your illness and treatment, and 3. for ongoing care. In some cases, specialist follow up is recommended. You will need to call to arrange an appointment. Recommended providers are listed in this packet. Please take your discharge instructions with you when you go to your follow-up appointment.      If your symptoms become worse or you do not improve as expected, please return to us. We are available 24 hours a day.      If a prescription has been provided, please fill it as soon as possible to prevent a delay in treatment. If you have any questions or reservations about taking the medication due to side effects or interactions with other medications,

## 2024-08-25 NOTE — ED PROVIDER NOTES
[facility] EMERGENCY DEPT  EMERGENCY DEPARTMENT ENCOUNTER    Patient Name: Lamont Naranjo  MRN: [id number]  YOB: 1960  Provider: Ervin Gavin MD  PCP: Unknown, Provider   Time/Date of evaluation: 3:19 AM EDT on 8/25/24    History of Presenting Illness     Chief Complaint   Patient presents with    Arm Injury       History Provided by: Patient   History is limited by: Nothing    HISTORY (Narative):   Lamont Naranjo is a 64 y.o. male with a PMHX of PTSD  who presents to the emergency department (room RW) by EMS C/O bilateral elbow pain onset today.  Patient states that he fell after he was doing wheelies in his wheelchair associated sxs include bilateral elbow pain. Patient denies head injury, LOC or any other sxs or complaints.  Patient is supposed be on blood thinners but does not take them.    Nursing Notes were all reviewed and agreed with or any disagreements were addressed in the HPI.    Past History     PAST MEDICAL HISTORY:  Past Medical History:   Diagnosis Date    Anxiety     Depression     Enlarged prostate     PTSD (post-traumatic stress disorder)        PAST SURGICAL HISTORY:  Past Surgical History:   Procedure Laterality Date    ORTHOPEDIC SURGERY      double amputee       FAMILY HISTORY:  No family history on file.    SOCIAL HISTORY:  Social History     Tobacco Use    Smoking status: Every Day   Substance Use Topics    Alcohol use: Yes    Drug use: Yes     Types: Cocaine, Marijuana (Weed)       MEDICATIONS:  No current facility-administered medications for this encounter.     Current Outpatient Medications   Medication Sig Dispense Refill    apixaban (ELIQUIS) 5 MG TABS tablet Take 1 tablet by mouth 2 times daily 60 tablet 0    apixaban (ELIQUIS) 5 MG TABS tablet Take 1 tablet by mouth 2 times daily      ketorolac (TORADOL) 10 MG tablet Take 1 tablet by mouth every 6 hours as needed for Pain 20 tablet 0    gabapentin (NEURONTIN) 300 MG capsule Take 1 capsule by mouth 3 times daily for  recognition software.  Quite often unanticipated grammatical, syntax, homophones, and other interpretive errors are inadvertently transcribed by the computer software.  Please disregard these errors.  Please excuse any errors that have escaped final proofreading.    Ervin Gavin MD  (Electronically signed)            Ervin Gavin MD  08/25/24 7801

## 2024-10-11 PROCEDURE — 99283 EMERGENCY DEPT VISIT LOW MDM: CPT

## 2024-10-12 ENCOUNTER — HOSPITAL ENCOUNTER (EMERGENCY)
Facility: HOSPITAL | Age: 64
Discharge: HOME | End: 2024-10-12
Attending: EMERGENCY MEDICINE
Payer: MEDICARE

## 2024-10-12 VITALS
WEIGHT: 150 LBS | OXYGEN SATURATION: 100 % | TEMPERATURE: 98 F | HEIGHT: 70 IN | RESPIRATION RATE: 20 BRPM | BODY MASS INDEX: 21.47 KG/M2 | SYSTOLIC BLOOD PRESSURE: 133 MMHG | HEART RATE: 98 BPM | DIASTOLIC BLOOD PRESSURE: 90 MMHG

## 2024-10-12 DIAGNOSIS — M79.18 MUSCULOSKELETAL PAIN: Primary | ICD-10-CM

## 2024-10-12 PROCEDURE — 6370000000 HC RX 637 (ALT 250 FOR IP): Performed by: EMERGENCY MEDICINE

## 2024-10-12 RX ORDER — IBUPROFEN 600 MG/1
600 TABLET, FILM COATED ORAL 4 TIMES DAILY PRN
Qty: 28 TABLET | Refills: 0 | Status: SHIPPED | OUTPATIENT
Start: 2024-10-12 | End: 2024-10-19

## 2024-10-12 RX ORDER — POLYMYXIN B SULFATE AND TRIMETHOPRIM 1; 10000 MG/ML; [USP'U]/ML
1 SOLUTION OPHTHALMIC ONCE
Status: DISCONTINUED | OUTPATIENT
Start: 2024-10-12 | End: 2024-10-12

## 2024-10-12 RX ORDER — IBUPROFEN 600 MG/1
600 TABLET, FILM COATED ORAL
Status: COMPLETED | OUTPATIENT
Start: 2024-10-12 | End: 2024-10-12

## 2024-10-12 RX ADMIN — IBUPROFEN 600 MG: 600 TABLET, FILM COATED ORAL at 03:35

## 2024-10-12 ASSESSMENT — PAIN DESCRIPTION - DESCRIPTORS
DESCRIPTORS: ACHING
DESCRIPTORS: ACHING;THROBBING

## 2024-10-12 ASSESSMENT — ENCOUNTER SYMPTOMS
ABDOMINAL PAIN: 0
COUGH: 0
SHORTNESS OF BREATH: 0
VOMITING: 0
NAUSEA: 0

## 2024-10-12 ASSESSMENT — PAIN DESCRIPTION - LOCATION
LOCATION: ARM
LOCATION: ARM

## 2024-10-12 ASSESSMENT — PAIN SCALES - GENERAL
PAINLEVEL_OUTOF10: 10
PAINLEVEL_OUTOF10: 2

## 2024-10-12 ASSESSMENT — PAIN - FUNCTIONAL ASSESSMENT: PAIN_FUNCTIONAL_ASSESSMENT: 0-10

## 2024-10-12 ASSESSMENT — PAIN DESCRIPTION - ORIENTATION
ORIENTATION: LEFT
ORIENTATION: LEFT

## 2024-10-12 NOTE — ED PROVIDER NOTES
EMERGENCY DEPARTMENT HISTORY AND PHYSICAL EXAM    5:23 AM      Date: 10/12/2024  Patient Name: Lamont Naranjo    History of Presenting Illness     Chief Complaint   Patient presents with    Arm Pain     Pt to Ed reports left arm pain states unable to use left arm . Pt able to wheel self to triage w/o difficulty        History From: Patient    Lamont Naranjo is a 64 y.o. 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  States the pain is constant in nature and worse when he moves it. Patient states \"my arm her much I could barely move\" but patient is seen rolling around the ED in his wheelchair, even at 1 point patient did a wheelie with his wheelchair.  Patient has full range of motion on exam.      Denies weakness, numbness, tingling  Denies any other acute complaints at this time           Nursing Notes were all reviewed and agreed with or any disagreements were addressed in the HPI.    PCP: No, Pcp    No current facility-administered medications for this encounter.     Current Outpatient Medications   Medication Sig Dispense Refill    ibuprofen (ADVIL;MOTRIN) 600 MG tablet Take 1 tablet by mouth 4 times daily as needed for Pain 28 tablet 0    apixaban (ELIQUIS) 5 MG TABS tablet Take 1 tablet by mouth 2 times daily 60 tablet 0    apixaban (ELIQUIS) 5 MG TABS tablet Take 1 tablet by mouth 2 times daily      ketorolac (TORADOL) 10 MG tablet Take 1 tablet by mouth every 6 hours as needed for Pain 20 tablet 0    gabapentin (NEURONTIN) 300 MG capsule Take 1 capsule by mouth 3 times daily for 14 days. Max Daily Amount: 900 mg 42 capsule 0    ketorolac (TORADOL) 10 MG tablet Take 1 tablet by mouth every 6 hours as needed for Pain 20 tablet 0    diclofenac sodium (VOLTAREN) 1 % GEL Apply 2 g  Determinants affecting Dx or Tx: Patient lacks support at home or lives alone.  Patient cannot afford medications.     Records Reviewed (source and summary of external notes): Nursing Notes and Old Medical Records    Procedures    Critical Care Time: 0 min    SCREENING TOOLS:  None    CLINICAL MANAGEMENT TOOLS:  Not Applicable    Is this patient to be included in the SEP-1 core measure? No Exclusion criteria - the patient is NOT to be included for SEP-1 Core Measure due to: Infection is not suspected         Diagnosis     Clinical Impression:   1. Musculoskeletal pain        Disposition: [facility] EMERGENCY DEPT  [address] [facility] [address] [phone]    As needed, If symptoms worsen    [facility] MEDICINE  [address] [facility] [address] [phone]  Schedule an appointment as soon as possible for a visit          Disclaimer: Sections of this note are dictated using utilizing voice recognition software.  Minor typographical errors may be present. If questions arise, please do not hesitate to contact me or call our department.        Leti Bonilla MD  10/12/24 0502

## 2024-10-12 NOTE — ED TRIAGE NOTES
Pt to Ed reports left arm pain states unable to use left arm . Pt able to wheel self to triage w/o difficulty

## 2024-10-26 ENCOUNTER — HOSPITAL ENCOUNTER (EMERGENCY)
Facility: HOSPITAL | Age: 64
Discharge: HOME | End: 2024-10-26
Attending: EMERGENCY MEDICINE
Payer: MEDICARE

## 2024-10-26 VITALS
HEIGHT: 70 IN | SYSTOLIC BLOOD PRESSURE: 126 MMHG | DIASTOLIC BLOOD PRESSURE: 87 MMHG | TEMPERATURE: 97.5 F | OXYGEN SATURATION: 98 % | RESPIRATION RATE: 20 BRPM | BODY MASS INDEX: 21.52 KG/M2 | HEART RATE: 94 BPM

## 2024-10-26 DIAGNOSIS — T14.8XXA ABRASION: Primary | ICD-10-CM

## 2024-10-26 PROCEDURE — 99283 EMERGENCY DEPT VISIT LOW MDM: CPT

## 2024-10-26 ASSESSMENT — PAIN - FUNCTIONAL ASSESSMENT: PAIN_FUNCTIONAL_ASSESSMENT: NONE - DENIES PAIN

## 2024-10-27 NOTE — ED PROVIDER NOTES
[facility] EMERGENCY DEPT  EMERGENCY DEPARTMENT ENCOUNTER    Patient Name: Lamotn Naranjo  MRN: [id number]  YOB: 1960  Provider: Kike Begum MD  PCP: Tanisha Pcp   Time/Date of evaluation: 10:57 PM EDT on 10/26/24    History of Presenting Illness     History Provided by: Patient  History is limited by: Nothing     HISTORY:   Lamont Naranjo is a 64 y.o. male presenting due to complaint of an abrasion to his right lower leg.  He has a history of bilateral below-knee amputations.  He said his wheelchair was not fully locked when he was getting into it and he slid out scraping up with his left of his right lower leg.  Denies any head trauma or loss of consciousness.  Denies any other injuries or complaints.    Nursing Notes were all reviewed and agreed with or any disagreements were addressed in the HPI.    Past History     PAST MEDICAL HISTORY:  Past Medical History:   Diagnosis Date    Anxiety     Depression     Enlarged prostate     PTSD (post-traumatic stress disorder)        PAST SURGICAL HISTORY:  Past Surgical History:   Procedure Laterality Date    ORTHOPEDIC SURGERY      double amputee       FAMILY HISTORY:  History reviewed. No pertinent family history.    SOCIAL HISTORY:  Social History     Tobacco Use    Smoking status: Every Day   Substance Use Topics    Alcohol use: Yes    Drug use: Yes     Types: Cocaine, Marijuana (Weed)       MEDICATIONS:  No current facility-administered medications for this encounter.     Current Outpatient Medications   Medication Sig Dispense Refill    ibuprofen (ADVIL;MOTRIN) 600 MG tablet Take 1 tablet by mouth 4 times daily as needed for Pain 28 tablet 0    apixaban (ELIQUIS) 5 MG TABS tablet Take 1 tablet by mouth 2 times daily 60 tablet 0    apixaban (ELIQUIS) 5 MG TABS tablet Take 1 tablet by mouth 2 times daily      ketorolac (TORADOL) 10 MG tablet Take 1 tablet by mouth every 6 hours as needed for Pain 20 tablet 0    gabapentin (NEURONTIN) 300 MG capsule

## 2024-11-02 ENCOUNTER — HOSPITAL ENCOUNTER (EMERGENCY)
Facility: HOSPITAL | Age: 64
Discharge: HOME | End: 2024-11-03
Attending: EMERGENCY MEDICINE
Payer: MEDICARE

## 2024-11-02 VITALS
BODY MASS INDEX: 23.91 KG/M2 | RESPIRATION RATE: 18 BRPM | HEIGHT: 70 IN | HEART RATE: 85 BPM | WEIGHT: 167 LBS | OXYGEN SATURATION: 100 %

## 2024-11-02 DIAGNOSIS — M25.512 LEFT SHOULDER PAIN, UNSPECIFIED CHRONICITY: ICD-10-CM

## 2024-11-02 DIAGNOSIS — F10.920 ACUTE ALCOHOLIC INTOXICATION WITHOUT COMPLICATION (HCC): Primary | ICD-10-CM

## 2024-11-02 PROCEDURE — 99283 EMERGENCY DEPT VISIT LOW MDM: CPT

## 2024-11-03 NOTE — ED PROVIDER NOTES
[facility] EMERGENCY DEPT  EMERGENCY DEPARTMENT ENCOUNTER       Pt Name: Lamont Naranjo  MRN: [id number]  Birthdate 1960  Date of evaluation: 11/2/2024  PCP: Tanisha, Pcp  Note Started: 1:02 AM 11/2/24     CHIEF COMPLAINT       Chief Complaint   Patient presents with    Shoulder Pain     Pt BIBA for L shoulder pain s/p \"popping wheelies\" on his W/C. Pt has +ROM to B/L upper extremities without obvious deformities noted. Pt is able to follow simple commands, but appears intoxicated and needs constant redirection to continue to triage assessments with RN and PA. Pt stated he just wanted to take an UBER home, but is now agreeing to stay in ED for further evaluation.         HISTORY OF PRESENT ILLNESS: 1 or more elements      History From: Patient and EMS  HPI Limitations: None  Chronic Conditions: Bilateral below the knee amputations secondary to tularemia infection and septic emboli, polysubstance abuse, alcoholism, hypertension, CAD,  Social Determinants affecting Dx or Tx: none      Lamont Naranjo is a 64 y.o. male who presents to ED c/o multiple complaints.  Patient appears clinically intoxicated and smells strongly of alcohol and has various complaints that have not been consistent throughout his history.  He does state that he has left shoulder pain and calluses to his hands.  He states that he was try to do a wheelie on his wheelchair but did not fall and is concerned that he does not have his prosthetic devices..  He otherwise has no specific complaints patient was also seen at [facility] early this morning for complaint of generalized body pain, appears was told to fill his gabapentin.      Nursing Notes were all reviewed and agreed with or any disagreements were addressed in the HPI.    PAST HISTORY     Past Medical History:  Past Medical History:   Diagnosis Date    Anxiety     Depression     Enlarged prostate     PTSD (post-traumatic stress disorder)        Past Surgical History:  Past Surgical History:

## 2024-11-03 NOTE — ED TRIAGE NOTES
Pt BIBA for L shoulder pain s/p \"popping wheelies\" on his W/C. Pt has +ROM to B/L upper extremities without obvious deformities noted. Pt is able to follow simple commands, but appears intoxicated and needs constant redirection to continue to triage assessments with RN and PA. Pt stated he just wanted to take an UBER home, but is now agreeing to stay in ED for further evaluation. CN aware and pt is to be moved from WR to Main ED.

## 2024-11-04 ENCOUNTER — HOSPITAL ENCOUNTER (EMERGENCY)
Facility: HOSPITAL | Age: 64
Discharge: HOME | End: 2024-11-04
Attending: EMERGENCY MEDICINE
Payer: MEDICARE

## 2024-11-04 VITALS
OXYGEN SATURATION: 100 % | TEMPERATURE: 97.2 F | HEART RATE: 88 BPM | WEIGHT: 167 LBS | RESPIRATION RATE: 12 BRPM | DIASTOLIC BLOOD PRESSURE: 87 MMHG | HEIGHT: 70 IN | SYSTOLIC BLOOD PRESSURE: 110 MMHG | BODY MASS INDEX: 23.91 KG/M2

## 2024-11-04 DIAGNOSIS — M79.10 MYALGIA: Primary | ICD-10-CM

## 2024-11-04 PROCEDURE — 96372 THER/PROPH/DIAG INJ SC/IM: CPT

## 2024-11-04 PROCEDURE — 6370000000 HC RX 637 (ALT 250 FOR IP): Performed by: EMERGENCY MEDICINE

## 2024-11-04 PROCEDURE — 6360000002 HC RX W HCPCS: Performed by: EMERGENCY MEDICINE

## 2024-11-04 PROCEDURE — 99284 EMERGENCY DEPT VISIT MOD MDM: CPT

## 2024-11-04 RX ORDER — KETOROLAC TROMETHAMINE 15 MG/ML
15 INJECTION, SOLUTION INTRAMUSCULAR; INTRAVENOUS ONCE
Status: COMPLETED | OUTPATIENT
Start: 2024-11-04 | End: 2024-11-04

## 2024-11-04 RX ORDER — FLUTICASONE PROPIONATE 50 MCG
1 SPRAY, SUSPENSION (ML) NASAL DAILY PRN
Qty: 32 G | Refills: 1 | Status: SHIPPED | OUTPATIENT
Start: 2024-11-04

## 2024-11-04 RX ORDER — GABAPENTIN 300 MG/1
300 CAPSULE ORAL
Status: COMPLETED | OUTPATIENT
Start: 2024-11-04 | End: 2024-11-04

## 2024-11-04 RX ADMIN — GABAPENTIN 300 MG: 300 CAPSULE ORAL at 01:17

## 2024-11-04 RX ADMIN — KETOROLAC TROMETHAMINE 15 MG: 15 INJECTION, SOLUTION INTRAMUSCULAR; INTRAVENOUS at 01:16

## 2024-11-04 ASSESSMENT — PAIN SCALES - GENERAL: PAINLEVEL_OUTOF10: 10

## 2024-11-04 NOTE — ED PROVIDER NOTES
EMERGENCY DEPARTMENT HISTORY AND PHYSICAL EXAM      Date: 11/4/2024  Patient Name: Lamont Naranjo      History of Presenting Illness     Chief Complaint   Patient presents with    Alcohol Intoxication       Location/Duration/Severity/Modifying factors   Chief Complaint   Patient presents with    Alcohol Intoxication       HPI:  Lamont Naranjo is a 64 y.o. male with PMH significant for bilateral BKA's, anxiety, depression, PTSD, frequent ER visits presents with complaint of generalized bodyaches.  Cannot specify exactly how long this pain has been present for.  Was seen recently at [facility] for the same.  Has not been able to  his gabapentin prescribed him for chronic pain.  Denies any other complaints this time.  Comes in by EMS.  Appears to be intoxicated with alcohol.      PCP: No, Pcp    No current facility-administered medications for this encounter.     Current Outpatient Medications   Medication Sig Dispense Refill    tiZANidine (ZANAFLEX) 4 MG tablet Take 1 tablet by mouth nightly as needed (Muscle spasms) 10 tablet 0    ibuprofen (ADVIL;MOTRIN) 600 MG tablet Take 1 tablet by mouth 4 times daily as needed for Pain 28 tablet 0    apixaban (ELIQUIS) 5 MG TABS tablet Take 1 tablet by mouth 2 times daily 60 tablet 0    apixaban (ELIQUIS) 5 MG TABS tablet Take 1 tablet by mouth 2 times daily      ketorolac (TORADOL) 10 MG tablet Take 1 tablet by mouth every 6 hours as needed for Pain 20 tablet 0    gabapentin (NEURONTIN) 300 MG capsule Take 1 capsule by mouth 3 times daily for 14 days. Max Daily Amount: 900 mg 42 capsule 0    ketorolac (TORADOL) 10 MG tablet Take 1 tablet by mouth every 6 hours as needed for Pain 20 tablet 0    diclofenac sodium (VOLTAREN) 1 % GEL Apply 2 g topically 4 times daily as needed for Pain 100 g 1    lidocaine (LIDODERM) 5 % Place 1 patch onto the skin daily 12 hours on, 12 hours off. 30 patch 0    acetaminophen (TYLENOL) 325 MG tablet Take 650 mg by mouth every 4

## 2024-11-04 NOTE — ED TRIAGE NOTES
PT c/o pain all over and ETOH intoxication. Sts he drank 2 5th's of alc tonight. Ems sts he was found at [facility] thru attempting to order via wheelchair. Stating that he is hungry ans has pain all over.

## 2024-11-05 ENCOUNTER — APPOINTMENT (OUTPATIENT)
Facility: HOSPITAL | Age: 64
End: 2024-11-05
Payer: MEDICARE

## 2024-11-05 ENCOUNTER — HOSPITAL ENCOUNTER (EMERGENCY)
Facility: HOSPITAL | Age: 64
Discharge: HOME | End: 2024-11-05
Attending: EMERGENCY MEDICINE
Payer: MEDICARE

## 2024-11-05 VITALS
DIASTOLIC BLOOD PRESSURE: 83 MMHG | RESPIRATION RATE: 16 BRPM | SYSTOLIC BLOOD PRESSURE: 115 MMHG | OXYGEN SATURATION: 100 % | HEART RATE: 80 BPM | TEMPERATURE: 97.4 F

## 2024-11-05 DIAGNOSIS — M25.512 ACUTE PAIN OF LEFT SHOULDER: Primary | ICD-10-CM
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
## 2024-11-05 PROCEDURE — 73000 X-RAY EXAM OF COLLAR BONE: CPT

## 2024-11-05 PROCEDURE — 99283 EMERGENCY DEPT VISIT LOW MDM: CPT

## 2024-11-05 PROCEDURE — 6370000000 HC RX 637 (ALT 250 FOR IP): Performed by: EMERGENCY MEDICINE

## 2024-11-05 RX ORDER — GABAPENTIN 300 MG/1
300 CAPSULE ORAL
Status: COMPLETED | OUTPATIENT
Start: 2024-11-05 | End: 2024-11-05

## 2024-11-05 RX ORDER — LIDOCAINE 4 G/G
1 PATCH TOPICAL
Status: DISCONTINUED | OUTPATIENT
Start: 2024-11-05 | End: 2024-11-05 | Stop reason: HOSPADM

## 2024-11-05 RX ORDER — OLOPATADINE HYDROCHLORIDE 1 MG/ML
1 SOLUTION/ DROPS OPHTHALMIC 2 TIMES DAILY PRN
Qty: 5 ML | Refills: 0 | Status: SHIPPED | OUTPATIENT
Start: 2024-11-05 | End: 2024-12-25

## 2024-11-05 RX ADMIN — GABAPENTIN 300 MG: 300 CAPSULE ORAL at 03:40

## 2024-11-05 NOTE — DISCHARGE INSTRUCTIONS
I have ordered Patanol eyedrops for possible allergic conjunctivitis causing eye itching, irritation.  Try the gabapentin order for you as well as Tylenol 1 g, oral ibuprofen 600 mg, Lidoderm patches for pain relief.  Try stretching exercises of your left shoulder to prevent stiffness.

## 2024-11-05 NOTE — ED PROVIDER NOTES
transcribed by the computer software. Please disregard these errors. Please excuse any errors that have escaped final proofreading.     Huang Machado D.O.  Emergency Physician   Acute Care [facility]             Huang Machado DO  11/05/24 0405

## 2025-01-21 ENCOUNTER — HOSPITAL ENCOUNTER (EMERGENCY)
Facility: HOSPITAL | Age: 65
Discharge: HOME | End: 2025-01-21
Payer: MEDICARE

## 2025-01-21 ENCOUNTER — APPOINTMENT (OUTPATIENT)
Facility: HOSPITAL | Age: 65
End: 2025-01-21
Payer: MEDICARE

## 2025-01-21 VITALS
HEART RATE: 84 BPM | DIASTOLIC BLOOD PRESSURE: 96 MMHG | SYSTOLIC BLOOD PRESSURE: 150 MMHG | OXYGEN SATURATION: 100 % | BODY MASS INDEX: 21.52 KG/M2 | WEIGHT: 150 LBS | RESPIRATION RATE: 18 BRPM

## 2025-01-21 DIAGNOSIS — M79.604 RIGHT LEG PAIN: ICD-10-CM

## 2025-01-21 DIAGNOSIS — M25.512 ACUTE PAIN OF LEFT SHOULDER: ICD-10-CM
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
## 2025-01-21 PROCEDURE — 73562 X-RAY EXAM OF KNEE 3: CPT

## 2025-01-21 PROCEDURE — 73030 X-RAY EXAM OF SHOULDER: CPT

## 2025-01-21 PROCEDURE — 99283 EMERGENCY DEPT VISIT LOW MDM: CPT

## 2025-01-21 PROCEDURE — 6370000000 HC RX 637 (ALT 250 FOR IP)

## 2025-01-21 RX ORDER — CYCLOBENZAPRINE HCL 10 MG
10 TABLET ORAL 3 TIMES DAILY PRN
Qty: 21 TABLET | Refills: 0 | Status: SHIPPED | OUTPATIENT
Start: 2025-01-21 | End: 2025-01-22

## 2025-01-21 RX ORDER — LIDOCAINE 4 G/G
1 PATCH TOPICAL
Status: DISCONTINUED | OUTPATIENT
Start: 2025-01-21 | End: 2025-01-21 | Stop reason: HOSPADM

## 2025-01-21 RX ORDER — CYCLOBENZAPRINE HCL 10 MG
10 TABLET ORAL
Status: COMPLETED | OUTPATIENT
Start: 2025-01-21 | End: 2025-01-21

## 2025-01-21 RX ORDER — NAPROXEN 500 MG/1
500 TABLET ORAL 2 TIMES DAILY WITH MEALS
Qty: 14 TABLET | Refills: 0 | Status: SHIPPED | OUTPATIENT
Start: 2025-01-21 | End: 2025-01-22

## 2025-01-21 RX ORDER — NAPROXEN 250 MG/1
250 TABLET ORAL
Status: COMPLETED | OUTPATIENT
Start: 2025-01-21 | End: 2025-01-21

## 2025-01-21 RX ORDER — GABAPENTIN 100 MG/1
100 CAPSULE ORAL
Status: COMPLETED | OUTPATIENT
Start: 2025-01-21 | End: 2025-01-21

## 2025-01-21 RX ORDER — IBUPROFEN 600 MG/1
600 TABLET, FILM COATED ORAL
Status: DISCONTINUED | OUTPATIENT
Start: 2025-01-21 | End: 2025-01-21

## 2025-01-21 RX ORDER — GUAIFENESIN 600 MG/1
600 TABLET, EXTENDED RELEASE ORAL
Status: COMPLETED | OUTPATIENT
Start: 2025-01-21 | End: 2025-01-21

## 2025-01-21 RX ORDER — LIDOCAINE 50 MG/G
1 PATCH TOPICAL DAILY
Qty: 10 PATCH | Refills: 0 | Status: SHIPPED | OUTPATIENT
Start: 2025-01-21 | End: 2025-01-22

## 2025-01-21 RX ORDER — FLUTICASONE PROPIONATE 50 MCG
2 SPRAY, SUSPENSION (ML) NASAL DAILY
Qty: 16 G | Refills: 0 | Status: SHIPPED | OUTPATIENT
Start: 2025-01-21 | End: 2025-01-22

## 2025-01-21 RX ADMIN — GUAIFENESIN 600 MG: 600 TABLET, EXTENDED RELEASE ORAL at 11:45

## 2025-01-21 RX ADMIN — NAPROXEN 250 MG: 250 TABLET ORAL at 11:47

## 2025-01-21 RX ADMIN — CYCLOBENZAPRINE HYDROCHLORIDE 10 MG: 10 TABLET, FILM COATED ORAL at 11:46

## 2025-01-21 RX ADMIN — GABAPENTIN 100 MG: 100 CAPSULE ORAL at 11:46

## 2025-01-21 NOTE — ED PROVIDER NOTES
(source and summary of external notes): Prior medical records and Nursing notes    Vitals:    Vitals:    01/21/25 0901   BP: (!) 150/96   Pulse: 84   Resp: 18   SpO2: 100%   Weight: 68 kg (150 lb)        ED COURSE       SEPSIS Reassessment: Patient does NOT meet Sepsis criteria after ED workup    Clinical Management Tools:  Not Applicable    Patient was given the following medications:  Medications   lidocaine 4 % external patch 1 patch (1 patch TransDERmal Patch Applied 1/21/25 1145)   cyclobenzaprine (FLEXERIL) tablet 10 mg (10 mg Oral Given 1/21/25 1146)   naproxen (NAPROSYN) tablet 250 mg (250 mg Oral Given 1/21/25 1147)   gabapentin (NEURONTIN) capsule 100 mg (100 mg Oral Given 1/21/25 1146)   guaiFENesin (MUCINEX) extended release tablet 600 mg (600 mg Oral Given 1/21/25 1145)       CONSULTS: See ED Course/MDM for further details.  None     Social Determinants affecting Diagnosis/Treatment: Patient lacks a PCP. Given PCP/Free clinic resources.    Smoking Cessation: Not Applicable    PROCEDURES   Unless otherwise noted above, none.  Procedures      CRITICAL CARE TIME   Patient does not meet Critical Care Time, 0 minutes    ED IMPRESSION     1. Fall from wheelchair, initial encounter    2. Right leg pain    3. Acute pain of left shoulder          DISPOSITION/PLAN   DISPOSITION Decision To Discharge 01/21/2025 11:50:51 AM            Discharge Note: The patient is stable for discharge home. The signs, symptoms, diagnosis, and discharge instructions have been discussed, understanding conveyed, and agreed upon. The patient is to follow up as recommended or return to ER should their symptoms worsen.      PATIENT REFERRED TO:  [facility]  [address]  [facility] [address] [phone]    As needed    [place] [address]  [phone]    As needed    [facility]  [address], [place] [address] ([phone])

## 2025-01-21 NOTE — ED TRIAGE NOTES
Pt wheeled to triage. C/C right leg pain at his BKA and left shoulder pain. Pt reports he fell a couple of times.

## 2025-01-21 NOTE — DISCHARGE INSTRUCTIONS
Thank you for choosing our Emergency Department for your care.  It is our privilege to care for you in your time of need.  In the next several days, you may receive a survey via email or mailed to your home about your experience with our team.  We would greatly appreciate you taking a few minutes to complete the survey, as we use this information to learn what we have done well and what we could be doing better. Thank you for trusting us with your care!    Below you will find a list of your tests from today's visit.   Labs and Radiology Studies  No results found for this or any previous visit (from the past 12 hour(s)).  XR SHOULDER LEFT (MIN 2 VIEWS)    Result Date: 1/21/2025  EXAM: XR SHOULDER LEFT (MIN 2 VIEWS) INDICATION: Fall, L shoulder pain. COMPARISON: 11/5/2024. FINDINGS: 4 views of the left shoulder demonstrate no fracture, dislocation or other acute abnormality. There are degenerative findings. No significant change.     No acute abnormality. Electronically signed by LUPILLO DUNNE    XR KNEE RIGHT (3 VIEWS)    Result Date: 1/21/2025  EXAM: XR KNEE RIGHT (3 VIEWS) INDICATION: Pain around BKA. COMPARISON: 8/1/2024. FINDINGS: Three views of the right knee demonstrate no fracture or other acute osseous or articular abnormality. There is no effusion. BKA shows no erosion. There is no soft tissue gas or foreign body.     No acute abnormality. Electronically signed by LUPILLO DUNNE    ------------------------------------------------------------------------------------------------------------  The evaluation and treatment you received in the Emergency Department were for an urgent problem. It is important that you follow-up with a doctor, nurse practitioner, or physician assistant to:  (1) confirm your diagnosis,  (2) re-evaluation of changes in your illness and treatment, and (3) for ongoing care. Please take your discharge instructions with you when you go to your follow-up appointment.     If you have any

## 2025-01-22 RX ORDER — LIDOCAINE 50 MG/G
1 PATCH TOPICAL DAILY
Qty: 10 PATCH | Refills: 0 | Status: SHIPPED | OUTPATIENT
Start: 2025-01-22 | End: 2025-01-22

## 2025-01-22 RX ORDER — LIDOCAINE 50 MG/G
1 PATCH TOPICAL DAILY
Qty: 10 PATCH | Refills: 0 | Status: SHIPPED | OUTPATIENT
Start: 2025-01-22 | End: 2025-02-01

## 2025-01-22 RX ORDER — FLUTICASONE PROPIONATE 50 MCG
2 SPRAY, SUSPENSION (ML) NASAL DAILY
Qty: 16 G | Refills: 0 | Status: SHIPPED | OUTPATIENT
Start: 2025-01-22

## 2025-01-22 RX ORDER — NAPROXEN 500 MG/1
500 TABLET ORAL 2 TIMES DAILY WITH MEALS
Qty: 14 TABLET | Refills: 0 | Status: SHIPPED | OUTPATIENT
Start: 2025-01-22 | End: 2025-01-22

## 2025-01-22 RX ORDER — CYCLOBENZAPRINE HCL 10 MG
10 TABLET ORAL 3 TIMES DAILY PRN
Qty: 21 TABLET | Refills: 0 | Status: SHIPPED | OUTPATIENT
Start: 2025-01-22 | End: 2025-01-22

## 2025-01-22 RX ORDER — NAPROXEN 500 MG/1
500 TABLET ORAL 2 TIMES DAILY WITH MEALS
Qty: 14 TABLET | Refills: 0 | Status: SHIPPED | OUTPATIENT
Start: 2025-01-22

## 2025-01-22 RX ORDER — FLUTICASONE PROPIONATE 50 MCG
2 SPRAY, SUSPENSION (ML) NASAL DAILY
Qty: 16 G | Refills: 0 | Status: SHIPPED | OUTPATIENT
Start: 2025-01-22 | End: 2025-01-22

## 2025-01-22 RX ORDER — CYCLOBENZAPRINE HCL 10 MG
10 TABLET ORAL 3 TIMES DAILY PRN
Qty: 21 TABLET | Refills: 0 | Status: SHIPPED | OUTPATIENT
Start: 2025-01-22 | End: 2025-02-01

## 2025-01-23 ENCOUNTER — HOSPITAL ENCOUNTER (EMERGENCY)
Facility: HOSPITAL | Age: 65
Discharge: HOME | End: 2025-01-24
Attending: EMERGENCY MEDICINE
Payer: MEDICAID

## 2025-01-23 DIAGNOSIS — M25.512 CHRONIC LEFT SHOULDER PAIN: ICD-10-CM

## 2025-01-23 DIAGNOSIS — L97.911: Primary | ICD-10-CM

## 2025-01-23 DIAGNOSIS — G89.29 CHRONIC LEFT SHOULDER PAIN: ICD-10-CM

## 2025-01-23 DIAGNOSIS — T87.89: Primary | ICD-10-CM

## 2025-01-23 PROCEDURE — 99283 EMERGENCY DEPT VISIT LOW MDM: CPT

## 2025-01-24 VITALS
HEART RATE: 55 BPM | BODY MASS INDEX: 29.45 KG/M2 | TEMPERATURE: 97.5 F | OXYGEN SATURATION: 99 % | SYSTOLIC BLOOD PRESSURE: 95 MMHG | RESPIRATION RATE: 12 BRPM | DIASTOLIC BLOOD PRESSURE: 64 MMHG | HEIGHT: 60 IN | WEIGHT: 150 LBS

## 2025-01-24 PROCEDURE — 6370000000 HC RX 637 (ALT 250 FOR IP): Performed by: EMERGENCY MEDICINE

## 2025-01-24 RX ORDER — LIDOCAINE 4 G/G
1 PATCH TOPICAL
Status: DISCONTINUED | OUTPATIENT
Start: 2025-01-24 | End: 2025-01-24 | Stop reason: HOSPADM

## 2025-01-24 RX ORDER — GINSENG 100 MG
CAPSULE ORAL
Status: COMPLETED | OUTPATIENT
Start: 2025-01-24 | End: 2025-01-24

## 2025-01-24 RX ORDER — NAPROXEN 250 MG/1
500 TABLET ORAL
Status: COMPLETED | OUTPATIENT
Start: 2025-01-24 | End: 2025-01-24

## 2025-01-24 RX ORDER — CYCLOBENZAPRINE HCL 10 MG
10 TABLET ORAL
Status: COMPLETED | OUTPATIENT
Start: 2025-01-24 | End: 2025-01-24

## 2025-01-24 RX ADMIN — BACITRACIN: 500 OINTMENT TOPICAL at 01:53

## 2025-01-24 RX ADMIN — APIXABAN 5 MG: 5 TABLET, FILM COATED ORAL at 01:53

## 2025-01-24 RX ADMIN — NAPROXEN 500 MG: 250 TABLET ORAL at 01:53

## 2025-01-24 RX ADMIN — CYCLOBENZAPRINE HYDROCHLORIDE 10 MG: 10 TABLET, FILM COATED ORAL at 01:53

## 2025-01-24 NOTE — ED TRIAGE NOTES
Pt BIBA (NN11) c/o Etoh intoxication. Denies any medical complaints. Pt sts \"I'm just trying to get home.\" Pt in NAD.

## 2025-01-24 NOTE — ED PROVIDER NOTES
EMERGENCY DEPARTMENT HISTORY AND PHYSICAL EXAM      Date: 1/23/2025  Patient Name: Lamont Naranjo      History of Presenting Illness     Chief Complaint   Patient presents with    Alcohol Intoxication       Location/Duration/Severity/Modifying factors   Chief Complaint   Patient presents with    Alcohol Intoxication       HPI:  Lamont Naranjo is a 64 y.o. male with PMH significant for anxiety, depression, PTSD, prior DVT on Eliquis, bilateral BKA amputations, multiple finger amputations presents with continued left-sided shoulder pain after fall from his wheelchair recently as well as ulcer present after ripping off his scab from right BKA stump with. Pt  denies fevers, fall since being discharged yesterday from ED.  Was unable to  his prescriptions that were sent to his pharmacy.  Currently staying at the [facility].    PCP: No, Pcp    Current Facility-Administered Medications   Medication Dose Route Frequency Provider Last Rate Last Admin    naproxen (NAPROSYN) tablet 500 mg  500 mg Oral NOW Huang Machado, DO        cyclobenzaprine (FLEXERIL) tablet 10 mg  10 mg Oral NOW Huang Machado, DO        lidocaine 4 % external patch 1 patch  1 patch TransDERmal NOW Huang Machado, DO        bacitracin ointment   Topical NOW Huang Machado, DO        apixaban (ELIQUIS) tablet 5 mg  5 mg Oral NOW Huang Machado, DO         Current Outpatient Medications   Medication Sig Dispense Refill    cyclobenzaprine (FLEXERIL) 10 MG tablet Take 1 tablet by mouth 3 times daily as needed for Muscle spasms 21 tablet 0    fluticasone (FLONASE) 50 MCG/ACT nasal spray 2 sprays by Each Nostril route daily 16 g 0    lidocaine (LIDODERM) 5 % Place 1 patch onto the skin daily for 10 days 12 hours on, 12 hours off. 10 patch 0    naproxen (NAPROSYN) 500 MG tablet Take 1 tablet by mouth 2 times daily (with meals) 14 tablet 0    apixaban (ELIQUIS) 5 MG TABS tablet Take 1 tablet by mouth 2 times daily 60 tablet 0
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
## 2025-01-26 ENCOUNTER — HOSPITAL ENCOUNTER (EMERGENCY)
Facility: HOSPITAL | Age: 65
Discharge: LEFT BEFORE DISCHARGE | End: 2025-01-26

## 2025-01-26 ENCOUNTER — HOSPITAL ENCOUNTER (EMERGENCY)
Facility: HOSPITAL | Age: 65
Discharge: HOME | End: 2025-01-27
Attending: EMERGENCY MEDICINE
Payer: MEDICARE

## 2025-01-26 DIAGNOSIS — L97.911 ULCER OF RIGHT LOWER EXTREMITY, LIMITED TO BREAKDOWN OF SKIN (HCC): Primary | ICD-10-CM

## 2025-01-26 PROCEDURE — 99283 EMERGENCY DEPT VISIT LOW MDM: CPT
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
## 2025-01-27 ENCOUNTER — HOSPITAL ENCOUNTER (EMERGENCY)
Facility: HOSPITAL | Age: 65
Discharge: HOME | End: 2025-01-28
Attending: EMERGENCY MEDICINE
Payer: MEDICARE

## 2025-01-27 VITALS
DIASTOLIC BLOOD PRESSURE: 88 MMHG | HEART RATE: 60 BPM | TEMPERATURE: 98.8 F | RESPIRATION RATE: 20 BRPM | BODY MASS INDEX: 29.45 KG/M2 | SYSTOLIC BLOOD PRESSURE: 115 MMHG | HEIGHT: 60 IN | WEIGHT: 150 LBS | OXYGEN SATURATION: 100 %

## 2025-01-27 VITALS
DIASTOLIC BLOOD PRESSURE: 83 MMHG | HEART RATE: 53 BPM | RESPIRATION RATE: 15 BRPM | SYSTOLIC BLOOD PRESSURE: 108 MMHG | TEMPERATURE: 98.2 F | OXYGEN SATURATION: 97 %

## 2025-01-27 DIAGNOSIS — L97.919 NON-PRESSURE ULCER OF STUMP OF BELOW KNEE AMPUTATION OF RIGHT LOWER EXTREMITY (HCC): Primary | ICD-10-CM

## 2025-01-27 DIAGNOSIS — G89.29 CHRONIC LEFT SHOULDER PAIN: ICD-10-CM

## 2025-01-27 DIAGNOSIS — T87.89 NON-PRESSURE ULCER OF STUMP OF BELOW KNEE AMPUTATION OF RIGHT LOWER EXTREMITY (HCC): Primary | ICD-10-CM

## 2025-01-27 DIAGNOSIS — M25.512 CHRONIC LEFT SHOULDER PAIN: ICD-10-CM

## 2025-01-27 PROCEDURE — 99283 EMERGENCY DEPT VISIT LOW MDM: CPT

## 2025-01-27 NOTE — ED TRIAGE NOTES
Patient arrives via wheelchair for leg pain. States that his leg is infected on the right. There is no visible sign of infection on his leg via examination.     Advised to not do wheelies in the waiting room to prevent injury.     Extensive medical history.     AxOx4.    Dementia

## 2025-01-27 NOTE — ED PROVIDER NOTES
[facility]  EMERGENCY DEPARTMENT ENCOUNTER    Patient Name: Lamont Naranjo  MRN: [id number]  YOB: 1960  Provider: ADELA Begum MD am the primary clinician of record.  PCP: Tanisha, Pcp   Time/Date of evaluation: 1:39 AM EST on 1/27/25    History of Presenting Illness     History provided by: Patient  History is limited by: Nothing     Chief Complaint: Wound    HISTORY:  Lamont Naranjo is a 64 y.o. male presenting with a cut on his right leg stump.  Unclear how long ago this happened.  He is vague about the details.    Nursing Notes were all reviewed and agreed with or any disagreements were addressed in the HPI.    Past History     PAST MEDICAL HISTORY:  Past Medical History:   Diagnosis Date    Anxiety     Depression     Enlarged prostate     PTSD (post-traumatic stress disorder)        PAST SURGICAL HISTORY:  Past Surgical History:   Procedure Laterality Date    ORTHOPEDIC SURGERY      double amputee       FAMILY HISTORY:  No family history on file.    SOCIAL HISTORY:  Social History     Tobacco Use    Smoking status: Every Day   Substance Use Topics    Alcohol use: Yes    Drug use: Yes     Types: Cocaine, Marijuana (Weed)       MEDICATIONS:  No current facility-administered medications for this encounter.     Current Outpatient Medications   Medication Sig Dispense Refill    cyclobenzaprine (FLEXERIL) 10 MG tablet Take 1 tablet by mouth 3 times daily as needed for Muscle spasms 21 tablet 0    fluticasone (FLONASE) 50 MCG/ACT nasal spray 2 sprays by Each Nostril route daily 16 g 0    lidocaine (LIDODERM) 5 % Place 1 patch onto the skin daily for 10 days 12 hours on, 12 hours off. 10 patch 0    naproxen (NAPROSYN) 500 MG tablet Take 1 tablet by mouth 2 times daily (with meals) 14 tablet 0    apixaban (ELIQUIS) 5 MG TABS tablet Take 1 tablet by mouth 2 times daily 60 tablet 0    apixaban (ELIQUIS) 5 MG TABS tablet Take 1 tablet by mouth 2 times daily

## 2025-01-28 PROCEDURE — 6370000000 HC RX 637 (ALT 250 FOR IP): Performed by: EMERGENCY MEDICINE

## 2025-01-28 RX ORDER — LIDOCAINE 4 G/G
1 PATCH TOPICAL
Status: DISCONTINUED | OUTPATIENT
Start: 2025-01-28 | End: 2025-01-28 | Stop reason: HOSPADM

## 2025-01-28 RX ORDER — GABAPENTIN 300 MG/1
300 CAPSULE ORAL
Status: COMPLETED | OUTPATIENT
Start: 2025-01-28 | End: 2025-01-28

## 2025-01-28 RX ORDER — IBUPROFEN 600 MG/1
600 TABLET, FILM COATED ORAL
Status: COMPLETED | OUTPATIENT
Start: 2025-01-28 | End: 2025-01-28

## 2025-01-28 RX ORDER — GINSENG 100 MG
CAPSULE ORAL
Status: COMPLETED | OUTPATIENT
Start: 2025-01-28 | End: 2025-01-28

## 2025-01-28 RX ADMIN — IBUPROFEN 600 MG: 600 TABLET, FILM COATED ORAL at 01:04

## 2025-01-28 RX ADMIN — GABAPENTIN 300 MG: 300 CAPSULE ORAL at 01:03

## 2025-01-28 RX ADMIN — BACITRACIN: 500 OINTMENT TOPICAL at 01:03

## 2025-01-28 NOTE — DISCHARGE INSTRUCTIONS
Clean the wound daily with soap and water. Apply topical antibiotic ointment twice daily to the wound.  Keep covered with a Band-Aid, soft or compression stocking to help prevent pressure sores.  Try the gabapentin, muscle relaxant, oral ibuprofen for chronic left shoulder pain, shoulder stretches, warm compress to sore muscles.  Can also try gentle massage to sore muscles.
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
## 2025-01-29 NOTE — ED TRIAGE NOTES
Pt c/o bilateral elbow pain after falling while popping wheelies in his wheelchair. Denies hitting head and LOC. Sts he is prescribe thinners but doesn't take them, sts he prefers beer.   Ms Hubbard

## 2025-01-30 ENCOUNTER — HOSPITAL ENCOUNTER (EMERGENCY)
Facility: HOSPITAL | Age: 65
Discharge: HOME | End: 2025-01-30
Attending: STUDENT IN AN ORGANIZED HEALTH CARE EDUCATION/TRAINING PROGRAM
Payer: MEDICARE

## 2025-01-30 VITALS
RESPIRATION RATE: 18 BRPM | TEMPERATURE: 98 F | HEART RATE: 62 BPM | DIASTOLIC BLOOD PRESSURE: 65 MMHG | BODY MASS INDEX: 40.55 KG/M2 | WEIGHT: 150 LBS | SYSTOLIC BLOOD PRESSURE: 117 MMHG | OXYGEN SATURATION: 98 %

## 2025-01-30 DIAGNOSIS — L60.3 NAIL BREAKING: Primary | ICD-10-CM

## 2025-01-30 PROCEDURE — 99283 EMERGENCY DEPT VISIT LOW MDM: CPT

## 2025-01-30 PROCEDURE — 6370000000 HC RX 637 (ALT 250 FOR IP): Performed by: STUDENT IN AN ORGANIZED HEALTH CARE EDUCATION/TRAINING PROGRAM

## 2025-01-30 RX ORDER — LIDOCAINE 4 G/G
1 PATCH TOPICAL
Status: DISCONTINUED | OUTPATIENT
Start: 2025-01-30 | End: 2025-01-30 | Stop reason: HOSPADM

## 2025-01-30 ASSESSMENT — ENCOUNTER SYMPTOMS
CHEST TIGHTNESS: 0
SHORTNESS OF BREATH: 0
DIARRHEA: 0
VOMITING: 0
ABDOMINAL PAIN: 0
NAUSEA: 0

## 2025-01-30 NOTE — ED NOTES
Patient threw his arms up blocking the EMS stretcher from bringing him into the main ED. Patient began calling EMS staff inappropriate names. Patient transferred to his wheelchair and triage but staff. Patient supplied with several clothing items as he states he stooled himself.
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
## 2025-01-30 NOTE — ED PROVIDER NOTES
EMERGENCY DEPARTMENT HISTORY AND PHYSICAL EXAM      Date: (Not on file)  Patient Name: Lamont Naranjo    History of Presenting Illness     Chief Complaint   Patient presents with    Finger Pain       Patient is a 64-year-old male with past medical history significant for anxiety, depression, PTSD, bilateral BKA, presenting to the emergency department for complaint of a broken nail to his left thumb.  Unsure how this happened.  He has no other complaints.          PCP: No, Pcp    Current Facility-Administered Medications   Medication Dose Route Frequency Provider Last Rate Last Admin    lidocaine 4 % external patch 1 patch  1 patch TransDERmal NOW Edgardo Santa, DO         Current Outpatient Medications   Medication Sig Dispense Refill    cephALEXin (KEFLEX) 250 MG capsule Take 1 capsule by mouth 4 times daily for 7 days But only start taking if your right stump becomes red, hot to touch, increasingly painful 28 capsule 0    cyclobenzaprine (FLEXERIL) 10 MG tablet Take 1 tablet by mouth 3 times daily as needed for Muscle spasms 21 tablet 0    fluticasone (FLONASE) 50 MCG/ACT nasal spray 2 sprays by Each Nostril route daily 16 g 0    lidocaine (LIDODERM) 5 % Place 1 patch onto the skin daily for 10 days 12 hours on, 12 hours off. 10 patch 0    naproxen (NAPROSYN) 500 MG tablet Take 1 tablet by mouth 2 times daily (with meals) 14 tablet 0    apixaban (ELIQUIS) 5 MG TABS tablet Take 1 tablet by mouth 2 times daily 60 tablet 0    apixaban (ELIQUIS) 5 MG TABS tablet Take 1 tablet by mouth 2 times daily      gabapentin (NEURONTIN) 300 MG capsule Take 1 capsule by mouth 3 times daily for 14 days. Max Daily Amount: 900 mg 42 capsule 0    diclofenac sodium (VOLTAREN) 1 % GEL Apply 2 g topically 4 times daily as needed for Pain 100 g 1    acetaminophen (TYLENOL) 325 MG tablet Take 650 mg by mouth every 4 hours as needed      gabapentin (NEURONTIN) 300 MG capsule Take 300 mg by mouth 3 times daily.      sertraline

## 2025-02-06 ENCOUNTER — HOSPITAL ENCOUNTER (EMERGENCY)
Facility: HOSPITAL | Age: 65
Discharge: HOME | End: 2025-02-07
Attending: STUDENT IN AN ORGANIZED HEALTH CARE EDUCATION/TRAINING PROGRAM
Payer: MEDICARE

## 2025-02-06 VITALS
WEIGHT: 150 LBS | TEMPERATURE: 97.6 F | OXYGEN SATURATION: 98 % | SYSTOLIC BLOOD PRESSURE: 113 MMHG | RESPIRATION RATE: 19 BRPM | DIASTOLIC BLOOD PRESSURE: 96 MMHG | HEIGHT: 60 IN | HEART RATE: 108 BPM | BODY MASS INDEX: 29.45 KG/M2

## 2025-02-06 DIAGNOSIS — G89.29 CHRONIC LEFT SHOULDER PAIN: ICD-10-CM
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
## 2025-02-06 DIAGNOSIS — M25.512 CHRONIC LEFT SHOULDER PAIN: ICD-10-CM

## 2025-02-06 PROCEDURE — 99284 EMERGENCY DEPT VISIT MOD MDM: CPT

## 2025-02-06 RX ORDER — KETOROLAC TROMETHAMINE 15 MG/ML
15 INJECTION, SOLUTION INTRAMUSCULAR; INTRAVENOUS
Status: COMPLETED | OUTPATIENT
Start: 2025-02-07 | End: 2025-02-07

## 2025-02-06 RX ORDER — HYDROCODONE BITARTRATE AND ACETAMINOPHEN 5; 325 MG/1; MG/1
1 TABLET ORAL
Status: COMPLETED | OUTPATIENT
Start: 2025-02-07 | End: 2025-02-07

## 2025-02-06 ASSESSMENT — PAIN SCALES - GENERAL: PAINLEVEL_OUTOF10: 10

## 2025-02-06 ASSESSMENT — PAIN - FUNCTIONAL ASSESSMENT: PAIN_FUNCTIONAL_ASSESSMENT: 0-10

## 2025-02-07 ENCOUNTER — APPOINTMENT (OUTPATIENT)
Facility: HOSPITAL | Age: 65
End: 2025-02-07
Payer: MEDICARE

## 2025-02-07 PROCEDURE — 96372 THER/PROPH/DIAG INJ SC/IM: CPT

## 2025-02-07 PROCEDURE — 73020 X-RAY EXAM OF SHOULDER: CPT

## 2025-02-07 PROCEDURE — 6370000000 HC RX 637 (ALT 250 FOR IP): Performed by: STUDENT IN AN ORGANIZED HEALTH CARE EDUCATION/TRAINING PROGRAM

## 2025-02-07 PROCEDURE — 6360000002 HC RX W HCPCS: Performed by: STUDENT IN AN ORGANIZED HEALTH CARE EDUCATION/TRAINING PROGRAM

## 2025-02-07 RX ORDER — IBUPROFEN 800 MG/1
400 TABLET, FILM COATED ORAL EVERY 6 HOURS PRN
Qty: 120 TABLET | Refills: 3 | Status: SHIPPED | OUTPATIENT
Start: 2025-02-07

## 2025-02-07 RX ADMIN — KETOROLAC TROMETHAMINE 15 MG: 15 INJECTION, SOLUTION INTRAMUSCULAR; INTRAVENOUS at 00:00

## 2025-02-07 RX ADMIN — HYDROCODONE BITARTRATE AND ACETAMINOPHEN 1 TABLET: 5; 325 TABLET ORAL at 00:00

## 2025-02-07 NOTE — ED PROVIDER NOTES
rotator cuff.  Positive liftoff test.  Mild discomfort with abduction or abduction   Skin:     General: Skin is warm.      Capillary Refill: Capillary refill takes less than 2 seconds.   Neurological:      General: No focal deficit present.      Mental Status: He is alert. Mental status is at baseline.           Diagnostic Study Results     Labs -  No results found for this or any previous visit (from the past 12 hour(s)).    Radiologic Studies -   XR SHOULDER LEFT 1 VW   Final Result   No evidence of fracture on this single radiographic view         Electronically signed by Masoud Kaye            Medical Decision Making   I am the first provider for this patient.    I reviewed the vital signs, available nursing notes, past medical history, past surgical history, family history and social history.    Vital Signs-Reviewed the patient's vital signs.      EKG: none       ED Course: Progress Notes, Reevaluation, and Consults:    Provider Notes (Medical Decision Making):     MDM  64-year-old male who presents with left shoulder pain.  Will consider rotator cuff strain.  Will consider fracture versus dislocation.  Low suspicion for neurovascular injury as patient has 2+ radial pulse.  Will obtain imaging and provide symptomatic therapy.  Patient x-ray of the left shoulder shows no signs of acute traumatic changes.  Discussed workup with patient.  Agrees and plan.  Patient discharged.         Patient was given the following medications:  Medications   ketorolac (TORADOL) injection 15 mg (15 mg IntraMUSCular Given 2/7/25 0000)   HYDROcodone-acetaminophen (NORCO) 5-325 MG per tablet 1 tablet (1 tablet Oral Given 2/7/25 0000)       CONSULTS: (Who and What was discussed)  None    Chronic Conditions: none     Social Determinants affecting Dx or Tx: None    Records Reviewed (source and summary of external notes):  none     Procedures    Critical Care Time: none     SCREENING TOOLS:  None    CLINICAL MANAGEMENT TOOLS:  Not  Applicable      Diagnosis     Clinical Impression:   1. Fall, initial encounter    2. Chronic left shoulder pain        Disposition: home     No follow-up provider specified.     Disclaimer: Sections of this note are dictated using utilizing voice recognition software.  Minor typographical errors may be present. If questions arise, please do not hesitate to contact me or call our department.          Binh Bridges MD  02/07/25 7907     Detail Level: Detailed General Sunscreen Counseling: I recommended a broad spectrum sunscreen with a SPF of 30 or higher.  I explained that SPF 30 sunscreens block approximately 97 percent of the sun's harmful rays.  Sunscreens should be applied at least 15 minutes prior to expected sun exposure and then every 2 hours after that as long as sun exposure continues. If swimming or exercising sunscreen should be reapplied every 45 minutes to an hour after getting wet or sweating.  One ounce, or the equivalent of a shot glass full of sunscreen, is adequate to protect the skin not covered by a bathing suit. I also recommended a lip balm with a sunscreen as well. Sun protective clothing can be used in lieu of sunscreen but must be worn the entire time you are exposed to the sun's rays.

## 2025-02-07 NOTE — DISCHARGE INSTRUCTIONS
You were evaluated for fall and left shoulder pain .  Based on your work-up it was deemed that she was stable for discharge.  Please  your medication of motrin which was prescribed to you.  Please follow-up with your primary care physician if you have any further concerns and go over your work-up.  If you experience any chest pain, shortness of breath, worsening abdominal pain, vomiting blood, worsening headache, seizures, or any worsening of your symptoms please return to the emergency department immediately.  If you have any pending results or any further questions please contact the emergency department at [phone].
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
## 2025-02-07 NOTE — ED TRIAGE NOTES
Pt states falling from wheelchair, denies hitting his head. Pt is wheelchair bound. BLE amputee. Pt has \"pain all over\", states pain is chronic and he \"hurts all the time\"

## 2025-02-08 VITALS
TEMPERATURE: 97.9 F | DIASTOLIC BLOOD PRESSURE: 82 MMHG | HEART RATE: 97 BPM | OXYGEN SATURATION: 100 % | SYSTOLIC BLOOD PRESSURE: 122 MMHG | RESPIRATION RATE: 15 BRPM

## 2025-02-08 PROCEDURE — 99283 EMERGENCY DEPT VISIT LOW MDM: CPT

## 2025-02-09 ENCOUNTER — APPOINTMENT (OUTPATIENT)
Facility: HOSPITAL | Age: 65
End: 2025-02-09
Payer: MEDICARE

## 2025-02-09 ENCOUNTER — HOSPITAL ENCOUNTER (EMERGENCY)
Facility: HOSPITAL | Age: 65
Discharge: HOME | End: 2025-02-10
Payer: MEDICARE

## 2025-02-09 ENCOUNTER — HOSPITAL ENCOUNTER (EMERGENCY)
Facility: HOSPITAL | Age: 65
Discharge: HOME | End: 2025-02-09
Payer: MEDICARE

## 2025-02-09 VITALS
WEIGHT: 150 LBS | TEMPERATURE: 98.5 F | BODY MASS INDEX: 29.45 KG/M2 | RESPIRATION RATE: 18 BRPM | OXYGEN SATURATION: 97 % | HEIGHT: 60 IN | SYSTOLIC BLOOD PRESSURE: 140 MMHG | HEART RATE: 90 BPM | DIASTOLIC BLOOD PRESSURE: 82 MMHG

## 2025-02-09 DIAGNOSIS — M25.512 LEFT SHOULDER PAIN, UNSPECIFIED CHRONICITY: Primary | ICD-10-CM

## 2025-02-09 DIAGNOSIS — I10 ESSENTIAL HYPERTENSION: ICD-10-CM
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
## 2025-02-09 DIAGNOSIS — T14.8XXA ABRASION: Primary | ICD-10-CM

## 2025-02-09 DIAGNOSIS — T14.8XXA ABRASION: ICD-10-CM

## 2025-02-09 PROCEDURE — 99282 EMERGENCY DEPT VISIT SF MDM: CPT

## 2025-02-09 PROCEDURE — 73030 X-RAY EXAM OF SHOULDER: CPT

## 2025-02-09 PROCEDURE — 6370000000 HC RX 637 (ALT 250 FOR IP): Performed by: NURSE PRACTITIONER

## 2025-02-09 RX ORDER — ACETAMINOPHEN 325 MG/1
650 TABLET ORAL
Status: COMPLETED | OUTPATIENT
Start: 2025-02-09 | End: 2025-02-09

## 2025-02-09 RX ADMIN — ACETAMINOPHEN 650 MG: 325 TABLET ORAL at 01:46

## 2025-02-09 ASSESSMENT — PAIN SCALES - GENERAL: PAINLEVEL_OUTOF10: 10

## 2025-02-09 ASSESSMENT — PAIN - FUNCTIONAL ASSESSMENT: PAIN_FUNCTIONAL_ASSESSMENT: 0-10
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
## 2025-02-10 NOTE — ED PROVIDER NOTES
[facility]    Humiliation, Afraid, Rape, and Kick questionnaire     Physically Abused: No       Allergies:  Allergies   Allergen Reactions    Grass Pollen([id number] Jag) Itching    Molds & Smuts Other (See Comments)     Runny nose, sneezing, itchy nose    Runny nose, sneezing, itchy nose   Runny nose, sneezing, itchy nose      Runny nose, sneezing, itchy nose       CURRENT MEDICATIONS      No current facility-administered medications for this encounter.     Current Outpatient Medications   Medication Sig Dispense Refill    ibuprofen (IBU) 800 MG tablet Take 0.5 tablets by mouth every 6 hours as needed for Pain 120 tablet 3    fluticasone (FLONASE) 50 MCG/ACT nasal spray 2 sprays by Each Nostril route daily 16 g 0    naproxen (NAPROSYN) 500 MG tablet Take 1 tablet by mouth 2 times daily (with meals) 14 tablet 0    apixaban (ELIQUIS) 5 MG TABS tablet Take 1 tablet by mouth 2 times daily 60 tablet 0    apixaban (ELIQUIS) 5 MG TABS tablet Take 1 tablet by mouth 2 times daily      gabapentin (NEURONTIN) 300 MG capsule Take 1 capsule by mouth 3 times daily for 14 days. Max Daily Amount: 900 mg 42 capsule 0    diclofenac sodium (VOLTAREN) 1 % GEL Apply 2 g topically 4 times daily as needed for Pain 100 g 1    acetaminophen (TYLENOL) 325 MG tablet Take 650 mg by mouth every 4 hours as needed      gabapentin (NEURONTIN) 300 MG capsule Take 300 mg by mouth 3 times daily.      sertraline (ZOLOFT) 50 MG tablet Take 50 mg by mouth daily            PHYSICAL EXAM      Vitals:    02/09/25 2236   BP: (!) 140/82   Pulse: 90   Resp: 18   Temp: 98.5 °F (36.9 °C)   SpO2: 97%   Weight: 68 kg (150 lb)   Height: 1.295 m (4' 3\")     Physical Exam  Vitals and nursing note reviewed.   Constitutional:       General: He is not in acute distress.     Appearance: He is not ill-appearing or toxic-appearing.      Comments: Unkempt appearing   HENT:      Head: Normocephalic and atraumatic.      Right Ear: External ear normal.

## 2025-02-10 NOTE — ED TRIAGE NOTES
Patient arrived via POV with citizen that states they found patient in the middle of the road for 2 days now. Patient reports he has had several falls recently and hasn't been able to get his medications and hopes to get them tomorrow. Patient A&Ox4.

## 2025-02-17 ENCOUNTER — HOSPITAL ENCOUNTER (EMERGENCY)
Facility: HOSPITAL | Age: 65
Discharge: HOME | End: 2025-02-17
Attending: EMERGENCY MEDICINE
Payer: MEDICARE

## 2025-02-17 VITALS
RESPIRATION RATE: 19 BRPM | HEIGHT: 60 IN | TEMPERATURE: 97.7 F | OXYGEN SATURATION: 99 % | BODY MASS INDEX: 30.43 KG/M2 | SYSTOLIC BLOOD PRESSURE: 144 MMHG | DIASTOLIC BLOOD PRESSURE: 85 MMHG | WEIGHT: 155 LBS | HEART RATE: 98 BPM

## 2025-02-17 DIAGNOSIS — M79.609 PAIN IN EXTREMITY, UNSPECIFIED EXTREMITY: Primary | ICD-10-CM

## 2025-02-17 PROCEDURE — 6370000000 HC RX 637 (ALT 250 FOR IP): Performed by: EMERGENCY MEDICINE

## 2025-02-17 PROCEDURE — 99283 EMERGENCY DEPT VISIT LOW MDM: CPT

## 2025-02-17 RX ORDER — IBUPROFEN 600 MG/1
600 TABLET, FILM COATED ORAL
Status: COMPLETED | OUTPATIENT
Start: 2025-02-17 | End: 2025-02-17

## 2025-02-17 RX ORDER — LIDOCAINE 4 G/G
1 PATCH TOPICAL
Status: DISCONTINUED | OUTPATIENT
Start: 2025-02-17 | End: 2025-02-18 | Stop reason: HOSPADM

## 2025-02-17 RX ADMIN — IBUPROFEN 600 MG: 600 TABLET, FILM COATED ORAL at 23:13

## 2025-02-17 ASSESSMENT — PAIN - FUNCTIONAL ASSESSMENT: PAIN_FUNCTIONAL_ASSESSMENT: 0-10

## 2025-02-17 ASSESSMENT — PAIN SCALES - GENERAL: PAINLEVEL_OUTOF10: 10

## 2025-02-18 NOTE — ED TRIAGE NOTES
Patient wheeled into triage with complaints of leg and arm pain. Patient reports swelling of his arms and legs. Patient reports this has been going on for a couple of weeks. Patient A&Ox4.

## 2025-02-18 NOTE — ED PROVIDER NOTES
[facility]  EMERGENCY DEPARTMENT ENCOUNTER    Patient Name: Lamont Naranjo  MRN: [id number]  YOB: 1960  Provider: ADELA Begum MD am the primary clinician of record.  Time/Date of evaluation: 10:20 PM EST on 2/17/25    History of Presenting Illness     History provided by: Patient  History is limited by: Nothing     Chief Complaint: Erika pain    HISTORY:  Lamont Naranjo is a 64 y.o. male present with bilateral arm and leg pain.  He says he has a primary care physician at [facility] currently but missed his most recent appointment because he was in the emergency department.  He asked for lidocaine patch for chronic pain but unclear where he is going to put this.  Pain today is in his bilateral lower extremities as well as bilateral hands.    Nursing Notes were all reviewed and agreed with or any disagreements were addressed in the HPI.    Past History     PAST MEDICAL HISTORY:  Past Medical History:   Diagnosis Date    Anxiety     Depression     Enlarged prostate     PTSD (post-traumatic stress disorder)        PAST SURGICAL HISTORY:  Past Surgical History:   Procedure Laterality Date    ORTHOPEDIC SURGERY      double amputee       FAMILY HISTORY:  No family history on file.    SOCIAL HISTORY:  Social History     Tobacco Use    Smoking status: Every Day   Substance Use Topics    Alcohol use: Yes    Drug use: Yes     Types: Cocaine, Marijuana (Weed)       MEDICATIONS:  No current facility-administered medications for this encounter.     Current Outpatient Medications   Medication Sig Dispense Refill    ibuprofen (IBU) 800 MG tablet Take 0.5 tablets by mouth every 6 hours as needed for Pain 120 tablet 3    fluticasone (FLONASE) 50 MCG/ACT nasal spray 2 sprays by Each Nostril route daily 16 g 0    naproxen (NAPROSYN) 500 MG tablet Take 1 tablet by mouth 2 times daily (with meals) 14 tablet 0    apixaban (ELIQUIS) 5 MG TABS tablet Take 1 tablet by mouth 2 times

## 2025-05-20 ENCOUNTER — APPOINTMENT (OUTPATIENT)
Facility: HOSPITAL | Age: 65
DRG: 871 | End: 2025-05-20
Payer: MEDICARE

## 2025-05-20 ENCOUNTER — HOSPITAL ENCOUNTER (EMERGENCY)
Facility: HOSPITAL | Age: 65
Discharge: HOME | DRG: 871 | End: 2025-05-20
Payer: MEDICARE

## 2025-05-20 ENCOUNTER — HOSPITAL ENCOUNTER (INPATIENT)
Facility: HOSPITAL | Age: 65
LOS: 4 days | Discharge: HOME | DRG: 871 | End: 2025-05-25
Attending: EMERGENCY MEDICINE | Admitting: INTERNAL MEDICINE
Payer: MEDICARE

## 2025-05-20 VITALS
BODY MASS INDEX: 31.41 KG/M2 | WEIGHT: 160 LBS | SYSTOLIC BLOOD PRESSURE: 140 MMHG | OXYGEN SATURATION: 99 % | RESPIRATION RATE: 16 BRPM | DIASTOLIC BLOOD PRESSURE: 101 MMHG | HEIGHT: 60 IN | HEART RATE: 100 BPM | TEMPERATURE: 98 F

## 2025-05-20 DIAGNOSIS — I10 ESSENTIAL HYPERTENSION: ICD-10-CM

## 2025-05-20 DIAGNOSIS — M25.512 CHRONIC LEFT SHOULDER PAIN: Primary | ICD-10-CM

## 2025-05-20 DIAGNOSIS — G89.29 CHRONIC LEFT SHOULDER PAIN: Primary | ICD-10-CM

## 2025-05-20 DIAGNOSIS — A41.9 SEPSIS, DUE TO UNSPECIFIED ORGANISM, UNSPECIFIED WHETHER ACUTE ORGAN DYSFUNCTION PRESENT (HCC): Primary | ICD-10-CM

## 2025-05-20 DIAGNOSIS — J18.9 PNEUMONIA OF LEFT LOWER LOBE DUE TO INFECTIOUS ORGANISM: ICD-10-CM

## 2025-05-20 LAB
ALBUMIN SERPL-MCNC: 3.7 G/DL (ref 3.4–5)
ALBUMIN/GLOB SERPL: 1.1 (ref 0.8–1.7)
ALP SERPL-CCNC: 156 U/L (ref 45–117)
ALT SERPL-CCNC: 28 U/L (ref 10–50)
ANION GAP SERPL CALC-SCNC: 19 MMOL/L (ref 3–18)
AST SERPL-CCNC: 37 U/L (ref 10–38)
BASOPHILS # BLD: 0 K/UL (ref 0–0.1)
BASOPHILS NFR BLD: 0 % (ref 0–2)
BILIRUB SERPL-MCNC: 1.1 MG/DL (ref 0.2–1)
BUN SERPL-MCNC: 14 MG/DL (ref 6–23)
BUN/CREAT SERPL: 21 (ref 12–20)
CALCIUM SERPL-MCNC: 9.1 MG/DL (ref 8.5–10.1)
CHLORIDE SERPL-SCNC: 99 MMOL/L (ref 98–107)
CO2 SERPL-SCNC: 18 MMOL/L (ref 21–32)
CREAT SERPL-MCNC: 0.66 MG/DL (ref 0.6–1.3)
DIFFERENTIAL METHOD BLD: ABNORMAL
EOSINOPHIL # BLD: 0 K/UL (ref 0–0.4)
EOSINOPHIL NFR BLD: 0 % (ref 0–5)
ERYTHROCYTE [DISTWIDTH] IN BLOOD BY AUTOMATED COUNT: 18 % (ref 11.6–14.5)
ETHANOL SERPL-MCNC: 32 MG/DL (ref 0–0.08)
FLUAV RNA SPEC QL NAA+PROBE: NOT DETECTED
FLUBV RNA SPEC QL NAA+PROBE: NOT DETECTED
GLOBULIN SER CALC-MCNC: 3.5 G/DL (ref 2–4)
GLUCOSE SERPL-MCNC: 83 MG/DL (ref 74–108)
HCT VFR BLD AUTO: 40.9 % (ref 36–48)
HGB BLD-MCNC: 13.7 G/DL (ref 13–16)
IMM GRANULOCYTES # BLD AUTO: 0 K/UL
IMM GRANULOCYTES NFR BLD AUTO: 0 %
LACTATE BLD-SCNC: 2.87 MMOL/L (ref 0.4–2)
LIPASE SERPL-CCNC: 15 U/L (ref 13–75)
LYMPHOCYTES # BLD: 1.18 K/UL (ref 0.9–3.6)
LYMPHOCYTES NFR BLD: 6 % (ref 21–52)
MCH RBC QN AUTO: 30 PG (ref 24–34)
MCHC RBC AUTO-ENTMCNC: 33.5 G/DL (ref 31–37)
MCV RBC AUTO: 89.5 FL (ref 78–100)
MONOCYTES # BLD: 1.37 K/UL (ref 0.05–1.2)
MONOCYTES NFR BLD: 7 % (ref 3–10)
NEUTS BAND NFR BLD MANUAL: 13 % (ref 0–5)
NEUTS SEG # BLD: 17.05 K/UL (ref 1.8–8)
NEUTS SEG NFR BLD: 74 % (ref 40–73)
NRBC # BLD: 0 K/UL (ref 0–0.01)
NRBC BLD-RTO: 0 PER 100 WBC
PLATELET # BLD AUTO: 265 K/UL (ref 135–420)
PLATELET COMMENT: ABNORMAL
PMV BLD AUTO: 10.1 FL (ref 9.2–11.8)
POTASSIUM SERPL-SCNC: 4 MMOL/L (ref 3.5–5.5)
PROT SERPL-MCNC: 7.2 G/DL (ref 6.4–8.2)
RBC # BLD AUTO: 4.57 M/UL (ref 4.35–5.65)
RBC MORPH BLD: ABNORMAL
SARS-COV-2 RNA RESP QL NAA+PROBE: NOT DETECTED
SODIUM SERPL-SCNC: 136 MMOL/L (ref 136–145)
SOURCE: NORMAL
WBC # BLD AUTO: 19.6 K/UL (ref 4.6–13.2)

## 2025-05-20 PROCEDURE — 99285 EMERGENCY DEPT VISIT HI MDM: CPT

## 2025-05-20 PROCEDURE — 83605 ASSAY OF LACTIC ACID: CPT

## 2025-05-20 PROCEDURE — 96375 TX/PRO/DX INJ NEW DRUG ADDON: CPT

## 2025-05-20 PROCEDURE — 2580000003 HC RX 258: Performed by: EMERGENCY MEDICINE

## 2025-05-20 PROCEDURE — 84484 ASSAY OF TROPONIN QUANT: CPT

## 2025-05-20 PROCEDURE — 85025 COMPLETE CBC W/AUTO DIFF WBC: CPT

## 2025-05-20 PROCEDURE — 96361 HYDRATE IV INFUSION ADD-ON: CPT

## 2025-05-20 PROCEDURE — 6360000002 HC RX W HCPCS: Performed by: EMERGENCY MEDICINE

## 2025-05-20 PROCEDURE — 83690 ASSAY OF LIPASE: CPT

## 2025-05-20 PROCEDURE — 87040 BLOOD CULTURE FOR BACTERIA: CPT

## 2025-05-20 PROCEDURE — 80053 COMPREHEN METABOLIC PANEL: CPT

## 2025-05-20 PROCEDURE — 82077 ASSAY SPEC XCP UR&BREATH IA: CPT

## 2025-05-20 PROCEDURE — 99283 EMERGENCY DEPT VISIT LOW MDM: CPT

## 2025-05-20 PROCEDURE — 87636 SARSCOV2 & INF A&B AMP PRB: CPT

## 2025-05-20 PROCEDURE — 87154 CUL TYP ID BLD PTHGN 6+ TRGT: CPT

## 2025-05-20 PROCEDURE — 71045 X-RAY EXAM CHEST 1 VIEW: CPT

## 2025-05-20 RX ORDER — IOPAMIDOL 612 MG/ML
100 INJECTION, SOLUTION INTRAVASCULAR
Status: COMPLETED | OUTPATIENT
Start: 2025-05-20 | End: 2025-05-21

## 2025-05-20 RX ORDER — ONDANSETRON 2 MG/ML
4 INJECTION INTRAMUSCULAR; INTRAVENOUS
Status: COMPLETED | OUTPATIENT
Start: 2025-05-20 | End: 2025-05-20

## 2025-05-20 RX ORDER — LIDOCAINE 50 MG/G
1 PATCH TOPICAL DAILY
Qty: 10 PATCH | Refills: 0 | Status: ON HOLD | OUTPATIENT
Start: 2025-05-20 | End: 2025-05-25

## 2025-05-20 RX ORDER — 0.9 % SODIUM CHLORIDE 0.9 %
1000 INTRAVENOUS SOLUTION INTRAVENOUS ONCE
Status: COMPLETED | OUTPATIENT
Start: 2025-05-20 | End: 2025-05-21

## 2025-05-20 RX ORDER — GABAPENTIN 300 MG/1
300 CAPSULE ORAL 3 TIMES DAILY
Qty: 42 CAPSULE | Refills: 0 | Status: SHIPPED | OUTPATIENT
Start: 2025-05-20 | End: 2025-06-03

## 2025-05-20 RX ORDER — 0.9 % SODIUM CHLORIDE 0.9 %
200 INTRAVENOUS SOLUTION INTRAVENOUS ONCE
Status: COMPLETED | OUTPATIENT
Start: 2025-05-20 | End: 2025-05-21

## 2025-05-20 RX ADMIN — SODIUM CHLORIDE 1000 ML: 0.9 INJECTION, SOLUTION INTRAVENOUS at 22:35

## 2025-05-20 RX ADMIN — ONDANSETRON 4 MG: 2 INJECTION, SOLUTION INTRAMUSCULAR; INTRAVENOUS at 22:36

## 2025-05-20 RX ADMIN — SODIUM CHLORIDE 200 ML: 0.9 INJECTION, SOLUTION INTRAVENOUS at 23:45

## 2025-05-20 ASSESSMENT — PAIN DESCRIPTION - LOCATION: LOCATION: ARM

## 2025-05-20 ASSESSMENT — PAIN DESCRIPTION - PAIN TYPE: TYPE: ACUTE PAIN

## 2025-05-20 ASSESSMENT — PAIN DESCRIPTION - ORIENTATION: ORIENTATION: LEFT

## 2025-05-20 ASSESSMENT — PAIN SCALES - GENERAL: PAINLEVEL_OUTOF10: 10

## 2025-05-20 ASSESSMENT — PAIN DESCRIPTION - DESCRIPTORS: DESCRIPTORS: ACHING

## 2025-05-20 ASSESSMENT — PAIN - FUNCTIONAL ASSESSMENT: PAIN_FUNCTIONAL_ASSESSMENT: 0-10

## 2025-05-20 NOTE — ED PROVIDER NOTES
[facility]  EMERGENCY DEPARTMENT ENCOUNTER       Pt Name: Lamont Naranjo  MRN: [id number]  Birthdate 1960  Date of evaluation: 5/20/2025  PCP: Tanisha, Pcp  Note Started: 1:17 PM 5/20/25     CHIEF COMPLAINT       Chief Complaint   Patient presents with    Shoulder Injury     Pt arrives to hospital via wheelchair c/o L shoulder pain.   Pt is a bilateral amputee arrives via wheelchair.   Pt has + PMS in L arm.   Pt has rx from other facilities he requests we fill. Pt informed of where pharmacy is.   Pt states \"I got pain galore\"  Pt states he still wants to be seen. Pt speaking in full sentences w/o complications.           HISTORY OF PRESENT ILLNESS: 1 or more elements      History From: Patient  HPI Limitations: None  Chronic Conditions: Anxiety, depression, polysubstance abuse, A-fib with RVR, hypertension, bilateral below the knee amputations, phantom limb pain, chronic pain of left shoulder  Social Determinants affecting Dx or Tx: None       Lamont Naranjo is a 64 y.o. male who presents to ED c/o left shoulder pain.  Patient does have a history of chronic left shoulder pain and is seen in this or other emergency departments frequently for same.  Patient was just seen and evaluated at [facility] last night for same.  Patient arrives today denying new pain or injury but stating that he has been unable to get his prescriptions given to him last night (lidocaine patches and gabapentin) filled.  Patient states \"[facility] or my people so I wanted to come here.\"  No focal weakness or paresthesias, no neck pain, no chest pain or shortness of breath, no headache, dizziness, vision changes.  Patient is frequently homeless, in and out of shelters.     Nursing Notes were all reviewed and agreed with or any disagreements were addressed in the HPI.    PAST HISTORY     Past Medical History:  Past Medical History:   Diagnosis Date    Anxiety     Depression     Enlarged prostate     PTSD

## 2025-05-20 NOTE — ED TRIAGE NOTES
Pt arrives to hospital via wheelchair c/o L shoulder pain.   Pt is a bilateral amputee arrives via wheelchair.   Pt has + PMS in L arm.   Pt has rx from other facilities he requests we fill. Pt informed of where pharmacy is.   Pt states \"I got pain galore\"  Pt states he still wants to be seen. Pt speaking in full sentences w/o complications.

## 2025-05-21 ENCOUNTER — APPOINTMENT (OUTPATIENT)
Facility: HOSPITAL | Age: 65
DRG: 871 | End: 2025-05-21
Payer: MEDICARE

## 2025-05-21 PROBLEM — N39.0 COMPLICATED UTI (URINARY TRACT INFECTION): Status: ACTIVE | Noted: 2025-05-21

## 2025-05-21 PROBLEM — J18.9 LEFT LOWER LOBE PNEUMONIA: Status: ACTIVE | Noted: 2025-05-21

## 2025-05-21 PROBLEM — A41.9 SEVERE SEPSIS (HCC): Status: ACTIVE | Noted: 2025-05-21

## 2025-05-21 PROBLEM — R65.20 SEVERE SEPSIS (HCC): Status: ACTIVE | Noted: 2025-05-21

## 2025-05-21 LAB
APPEARANCE UR: ABNORMAL
BACTERIA URNS QL MICRO: ABNORMAL /HPF
BILIRUB UR QL: NEGATIVE
COLOR UR: YELLOW
EKG ATRIAL RATE: 111 BPM
EKG DIAGNOSIS: NORMAL
EKG P AXIS: 77 DEGREES
EKG P-R INTERVAL: 184 MS
EKG Q-T INTERVAL: 326 MS
EKG QRS DURATION: 70 MS
EKG QTC CALCULATION (BAZETT): 443 MS
EKG R AXIS: 68 DEGREES
EKG T AXIS: 66 DEGREES
EKG VENTRICULAR RATE: 111 BPM
EPITH CASTS URNS QL MICRO: ABNORMAL /LPF (ref 0–5)
GLUCOSE UR STRIP.AUTO-MCNC: NEGATIVE MG/DL
HGB UR QL STRIP: ABNORMAL
KETONES UR QL STRIP.AUTO: 15 MG/DL
LACTATE BLD-SCNC: 1.77 MMOL/L (ref 0.4–2)
LEUKOCYTE ESTERASE UR QL STRIP.AUTO: ABNORMAL
NITRITE UR QL STRIP.AUTO: NEGATIVE
PH UR STRIP: 5 (ref 5–8)
PROCALCITONIN SERPL-MCNC: 2.36 NG/ML
PROT UR STRIP-MCNC: 30 MG/DL
RBC #/AREA URNS HPF: ABNORMAL /HPF (ref 0–5)
SP GR UR REFRACTOMETRY: 1.03 (ref 1–1.03)
TROPONIN T SERPL HS-MCNC: 18.1 NG/L (ref 0–22)
UROBILINOGEN UR QL STRIP.AUTO: 1 EU/DL (ref 0.2–1)
VANCOMYCIN SERPL-MCNC: 5.3 UG/ML (ref 5–40)
VANCOMYCIN SERPL-MCNC: 57.5 UG/ML (ref 5–40)
WBC URNS QL MICRO: ABNORMAL /HPF (ref 0–5)

## 2025-05-21 PROCEDURE — 6360000002 HC RX W HCPCS: Performed by: EMERGENCY MEDICINE

## 2025-05-21 PROCEDURE — 93005 ELECTROCARDIOGRAM TRACING: CPT | Performed by: EMERGENCY MEDICINE

## 2025-05-21 PROCEDURE — 74177 CT ABD & PELVIS W/CONTRAST: CPT

## 2025-05-21 PROCEDURE — 83605 ASSAY OF LACTIC ACID: CPT

## 2025-05-21 PROCEDURE — 80202 ASSAY OF VANCOMYCIN: CPT

## 2025-05-21 PROCEDURE — 6370000000 HC RX 637 (ALT 250 FOR IP): Performed by: INTERNAL MEDICINE

## 2025-05-21 PROCEDURE — 1100000003 HC PRIVATE W/ TELEMETRY

## 2025-05-21 PROCEDURE — 2500000003 HC RX 250 WO HCPCS: Performed by: INTERNAL MEDICINE

## 2025-05-21 PROCEDURE — 96365 THER/PROPH/DIAG IV INF INIT: CPT

## 2025-05-21 PROCEDURE — 2580000003 HC RX 258: Performed by: INTERNAL MEDICINE

## 2025-05-21 PROCEDURE — 93010 ELECTROCARDIOGRAM REPORT: CPT | Performed by: INTERNAL MEDICINE

## 2025-05-21 PROCEDURE — 84145 PROCALCITONIN (PCT): CPT

## 2025-05-21 PROCEDURE — 0202U NFCT DS 22 TRGT SARS-COV-2: CPT

## 2025-05-21 PROCEDURE — 87086 URINE CULTURE/COLONY COUNT: CPT

## 2025-05-21 PROCEDURE — 6360000002 HC RX W HCPCS: Performed by: INTERNAL MEDICINE

## 2025-05-21 PROCEDURE — 2580000003 HC RX 258: Performed by: EMERGENCY MEDICINE

## 2025-05-21 PROCEDURE — 6360000004 HC RX CONTRAST MEDICATION: Performed by: EMERGENCY MEDICINE

## 2025-05-21 PROCEDURE — 81001 URINALYSIS AUTO W/SCOPE: CPT

## 2025-05-21 PROCEDURE — 96368 THER/DIAG CONCURRENT INF: CPT

## 2025-05-21 RX ORDER — SODIUM CHLORIDE 9 MG/ML
INJECTION, SOLUTION INTRAVENOUS PRN
Status: DISCONTINUED | OUTPATIENT
Start: 2025-05-21 | End: 2025-05-25 | Stop reason: HOSPADM

## 2025-05-21 RX ORDER — FLUTICASONE PROPIONATE 50 MCG
2 SPRAY, SUSPENSION (ML) NASAL DAILY
Status: DISCONTINUED | OUTPATIENT
Start: 2025-05-21 | End: 2025-05-25 | Stop reason: HOSPADM

## 2025-05-21 RX ORDER — ACETAMINOPHEN 650 MG/1
650 SUPPOSITORY RECTAL EVERY 6 HOURS PRN
Status: DISCONTINUED | OUTPATIENT
Start: 2025-05-21 | End: 2025-05-25 | Stop reason: HOSPADM

## 2025-05-21 RX ORDER — ACETAMINOPHEN 325 MG/1
650 TABLET ORAL EVERY 6 HOURS PRN
Status: DISCONTINUED | OUTPATIENT
Start: 2025-05-21 | End: 2025-05-25 | Stop reason: HOSPADM

## 2025-05-21 RX ORDER — ENOXAPARIN SODIUM 100 MG/ML
40 INJECTION SUBCUTANEOUS DAILY
Status: DISCONTINUED | OUTPATIENT
Start: 2025-05-21 | End: 2025-05-21

## 2025-05-21 RX ORDER — SODIUM CHLORIDE 0.9 % (FLUSH) 0.9 %
5-40 SYRINGE (ML) INJECTION EVERY 12 HOURS SCHEDULED
Status: DISCONTINUED | OUTPATIENT
Start: 2025-05-21 | End: 2025-05-25 | Stop reason: HOSPADM

## 2025-05-21 RX ORDER — ACETAMINOPHEN 325 MG/1
650 TABLET ORAL
Status: DISCONTINUED | OUTPATIENT
Start: 2025-05-21 | End: 2025-05-25 | Stop reason: HOSPADM

## 2025-05-21 RX ORDER — SODIUM CHLORIDE 9 MG/ML
INJECTION, SOLUTION INTRAVENOUS CONTINUOUS
Status: DISPENSED | OUTPATIENT
Start: 2025-05-21 | End: 2025-05-22

## 2025-05-21 RX ORDER — GABAPENTIN 300 MG/1
300 CAPSULE ORAL 3 TIMES DAILY
Status: DISCONTINUED | OUTPATIENT
Start: 2025-05-21 | End: 2025-05-25 | Stop reason: HOSPADM

## 2025-05-21 RX ORDER — SODIUM CHLORIDE 0.9 % (FLUSH) 0.9 %
5-40 SYRINGE (ML) INJECTION PRN
Status: DISCONTINUED | OUTPATIENT
Start: 2025-05-21 | End: 2025-05-25 | Stop reason: HOSPADM

## 2025-05-21 RX ORDER — NICOTINE 21 MG/24HR
1 PATCH, TRANSDERMAL 24 HOURS TRANSDERMAL DAILY
Status: DISCONTINUED | OUTPATIENT
Start: 2025-05-21 | End: 2025-05-25 | Stop reason: HOSPADM

## 2025-05-21 RX ADMIN — APIXABAN 5 MG: 5 TABLET, FILM COATED ORAL at 20:38

## 2025-05-21 RX ADMIN — VANCOMYCIN HYDROCHLORIDE 1750 MG: 10 INJECTION, POWDER, LYOPHILIZED, FOR SOLUTION INTRAVENOUS at 00:40

## 2025-05-21 RX ADMIN — SODIUM CHLORIDE: 0.9 INJECTION, SOLUTION INTRAVENOUS at 15:27

## 2025-05-21 RX ADMIN — CEFEPIME 2000 MG: 2 INJECTION, POWDER, FOR SOLUTION INTRAVENOUS at 05:40

## 2025-05-21 RX ADMIN — GABAPENTIN 300 MG: 300 CAPSULE ORAL at 22:02

## 2025-05-21 RX ADMIN — ACETAMINOPHEN 650 MG: 325 TABLET ORAL at 10:20

## 2025-05-21 RX ADMIN — IOPAMIDOL 100 ML: 612 INJECTION, SOLUTION INTRAVENOUS at 00:34

## 2025-05-21 RX ADMIN — PIPERACILLIN AND TAZOBACTAM 4500 MG: 4; .5 INJECTION, POWDER, LYOPHILIZED, FOR SOLUTION INTRAVENOUS at 00:15

## 2025-05-21 RX ADMIN — CEFEPIME 2000 MG: 2 INJECTION, POWDER, FOR SOLUTION INTRAVENOUS at 15:29

## 2025-05-21 RX ADMIN — CEFEPIME 2000 MG: 2 INJECTION, POWDER, FOR SOLUTION INTRAVENOUS at 20:43

## 2025-05-21 RX ADMIN — APIXABAN 5 MG: 5 TABLET, FILM COATED ORAL at 10:20

## 2025-05-21 RX ADMIN — ACETAMINOPHEN 650 MG: 325 TABLET ORAL at 20:38

## 2025-05-21 RX ADMIN — SODIUM CHLORIDE, PRESERVATIVE FREE 10 ML: 5 INJECTION INTRAVENOUS at 10:23

## 2025-05-21 RX ADMIN — SERTRALINE HYDROCHLORIDE 50 MG: 50 TABLET ORAL at 10:20

## 2025-05-21 RX ADMIN — VANCOMYCIN HYDROCHLORIDE 1250 MG: 10 INJECTION, POWDER, LYOPHILIZED, FOR SOLUTION INTRAVENOUS at 15:28

## 2025-05-21 ASSESSMENT — PAIN SCALES - GENERAL
PAINLEVEL_OUTOF10: 6
PAINLEVEL_OUTOF10: 0
PAINLEVEL_OUTOF10: 10

## 2025-05-21 ASSESSMENT — PAIN DESCRIPTION - DESCRIPTORS: DESCRIPTORS: SORE

## 2025-05-21 ASSESSMENT — PAIN - FUNCTIONAL ASSESSMENT: PAIN_FUNCTIONAL_ASSESSMENT: ACTIVITIES ARE NOT PREVENTED

## 2025-05-21 ASSESSMENT — PAIN SCALES - WONG BAKER: WONGBAKER_NUMERICALRESPONSE: HURTS A LITTLE BIT

## 2025-05-21 ASSESSMENT — PAIN DESCRIPTION - ORIENTATION: ORIENTATION: LEFT

## 2025-05-21 ASSESSMENT — PAIN DESCRIPTION - LOCATION: LOCATION: SHOULDER

## 2025-05-21 NOTE — ED PROVIDER NOTES
[facility] EMERGENCY DEPT  EMERGENCY DEPARTMENT ENCOUNTER    Patient Name: Lamont Naranjo  MRN: [id number]  YOB: 1960  Provider: Ervin Gavin MD  PCP: Tanisha Pcp   Time/Date of evaluation: 8:45 PM EDT on 5/20/25    History of Presenting Illness     Chief Complaint   Patient presents with    Vomiting       History Provided by: Patient   History is limited by: Nothing    HISTORY (Narative):   Lamont Naranjo is a 64 y.o. male with a PMHX of PTSD, BKA  who presents to the emergency department (room 15) by EMS C/O feeling sick onset today. Associated sxs include nausea, vomiting, tachycardia, fever. Patient denies any other sxs or complaints.     Nursing Notes were all reviewed and agreed with or any disagreements were addressed in the HPI.    Past History     PAST MEDICAL HISTORY:  Past Medical History:   Diagnosis Date    Anxiety     Depression     Enlarged prostate     PTSD (post-traumatic stress disorder)        PAST SURGICAL HISTORY:  Past Surgical History:   Procedure Laterality Date    ORTHOPEDIC SURGERY      double amputee       FAMILY HISTORY:  No family history on file.    SOCIAL HISTORY:  Social History     Tobacco Use    Smoking status: Every Day   Substance Use Topics    Alcohol use: Yes    Drug use: Yes     Types: Cocaine, Marijuana (Weed)       MEDICATIONS:  Current Facility-Administered Medications   Medication Dose Route Frequency Provider Last Rate Last Admin    ceFEPIme (MAXIPIME) 2,000 mg in sodium chloride 0.9 % 100 mL IVPB (addEASE)  2,000 mg IntraVENous Q8H You Velasco DO        vancomycin (VANCOCIN) 1750 mg in sodium chloride 0.9 % 500 mL IVPB  25 mg/kg IntraVENous Once Ervin Gavin  mL/hr at 05/21/25 0040 1,750 mg at 05/21/25 0040     Current Outpatient Medications   Medication Sig Dispense Refill    lidocaine (LIDODERM) 5 % Place 1 patch onto the skin daily for 10 days 12 hours on, 12 hours off. 10 patch 0    gabapentin (NEURONTIN) 300 MG capsule Take 1

## 2025-05-21 NOTE — ED NOTES
ED TO INPATIENT SBAR HANDOFF     Patient Name: Lamont Naranjo   Preferred Name: Lamont  : 1960  64 y.o.             Family/Caregiver Present: no   Code Status Order: Full Code  PO Status:[unfilled]  Telemetry Order: Yes  C-SSRS: Risk of Suicide: No Risk  Sitter no Safety  Restraints:    Sepsis Risk Score Sepsis V2 Risk Score: 10.5    Situation:  Chief Complaint   Patient presents with    Vomiting     Brief Description of Patient's Condition: stable  Mental Status: oriented, alert, and coherent  Arrived from: Home  Abnormal labs:   Abnormal Labs Reviewed   CBC WITH AUTO DIFFERENTIAL - Abnormal; Notable for the following components:       Result Value    WBC 19.6 (*)     RDW 18.0 (*)     Neutrophils % 74 (*)     Band Neutrophils 13 (*)     Lymphocytes % 6 (*)     Neutrophils Absolute 17.05 (*)     Monocytes Absolute 1.37 (*)     All other components within normal limits   COMPREHENSIVE METABOLIC PANEL - Abnormal; Notable for the following components:    CO2 18 (*)     Anion Gap 19 (*)     BUN/Creatinine Ratio 21 (*)     Total Bilirubin 1.1 (*)     Alk Phosphatase 156 (*)     All other components within normal limits   URINALYSIS - Abnormal; Notable for the following components:    Protein, UA 30 (*)     Ketones, Urine 15 (*)     Blood, Urine TRACE (*)     Leukocyte Esterase, Urine LARGE (*)     All other components within normal limits   URINALYSIS, MICRO - Abnormal; Notable for the following components:    BACTERIA, URINE 3+ (*)     All other components within normal limits   VANCOMYCIN LEVEL, RANDOM - Abnormal; Notable for the following components:    Vancomycin Rm 57.5 (*)     All other components within normal limits   POCT LACTIC ACID (LACTATE) - Abnormal; Notable for the following components:    POC Lactic Acid 2.87 (*)     All other components within normal limits        Background:  Allergies:   Allergies   Allergen Reactions    Grass Pollen(K-O-[id number]-T-Swt Jag) Itching    Molds & Smuts Other (See

## 2025-05-21 NOTE — ED NOTES
TRANSFER - OUT REPORT:    Verbal report given to MARIANNA Morales  on Lamont Naranjo  being transferred to [address] [place] [address] for routine progression of patient care       Report consisted of patient's Situation, Background, Assessment and   Recommendations(SBAR).     Information from the following report(s) Nurse Handoff Report, Index, ED Encounter Summary, ED SBAR, Adult Overview, Recent Results, and Med Rec Status was reviewed with the receiving nurse.    [place] Fall Assessment:                           Lines:   Peripheral IV 05/20/25 Left Antecubital (Active)        Opportunity for questions and clarification was provided.      Patient transported with:  Registered Nurse

## 2025-05-21 NOTE — PLAN OF CARE
Problem: Discharge Planning  Goal: Discharge to home or other facility with appropriate resources  Outcome: Progressing  Flowsheets (Taken 5/21/2025 2022)  Discharge to home or other facility with appropriate resources:   Identify barriers to discharge with patient and caregiver   Arrange for needed discharge resources and transportation as appropriate   Identify discharge learning needs (meds, wound care, etc)     Problem: Skin/Tissue Integrity  Goal: Skin integrity remains intact  Description: 1.  Monitor for areas of redness and/or skin breakdown2.  Assess vascular access sites hourly3.  Every 4-6 hours minimum:  Change oxygen saturation probe site4.  Every 4-6 hours:  If on nasal continuous positive airway pressure, respiratory therapy assess nares and determine need for appliance change or resting period  Outcome: Progressing     Problem: Safety - Adult  Goal: Free from fall injury  Outcome: Progressing

## 2025-05-21 NOTE — ED NOTES
This RN as well as security removed 3 large Bud Light canned beer from patient room in plastic bag and dumped them down the sink in the ED.

## 2025-05-21 NOTE — ED TRIAGE NOTES
Patient arrived to the ED from home with complaints of \"feeling sick\" and phantom pain. Would not go into detail about feeling sick. Vomited in waiting room.

## 2025-05-21 NOTE — ED NOTES
Report given to oncoming [place].     Patient information discussed and reviewed per facility protocol.     Outstanding orders reviewed (if applicable).    No applicable questions at this time.     Will sign off from patient.

## 2025-05-21 NOTE — H&P
History & Physical    Patient: Lamont Naranjo MRN: [id number]  CSN: [id number]    YOB: 1960  Age: 64 y.o.  Sex: male      DOA: 5/20/2025    Chief Complaint:   Chief Complaint   Patient presents with    Vomiting       Active Hospital Problems    Diagnosis Date Noted    Severe sepsis, source likely LLL Pn [A41.9, R65.20] 05/21/2025     Priority: High    Depression [F32.A]     Enlarged prostate [N40.0]     Anxiety [F41.9]     PTSD (post-traumatic stress disorder) [F43.10]        HPI:     Lamont Naranjo is a 64 y.o.  male w/ PMH of BPH, PAD s/p b/l BKA and finger amputations, PTSD and PTSD who presents with fatigue and N/V.    Mr. Naranjo, who was BIBA, is a wheelchair bound individual who frequently presents to the ED but rarely admitted.  Pt presents much more fatigue to ED today.  Pt endorses nausea, vomiting, and subjective fever.     Social Hx: Single. Endorses ETOH-use, cocaine and marijuana use. Pt is a every-day smoker.     FHx: Reviewed and non-contributory.     In the ED: Pt reviewed early IVF and modified IVF bolus in ED. The patient was cultured as well.       Past Medical History:   Diagnosis Date    Anxiety     Depression     Enlarged prostate     PTSD (post-traumatic stress disorder)        Past Surgical History:   Procedure Laterality Date    ORTHOPEDIC SURGERY      double amputee       History reviewed. No pertinent family history.    Social History     Socioeconomic History    Marital status: Single     Spouse name: None    Number of children: None    Years of education: None    Highest education level: None   Tobacco Use    Smoking status: Every Day   Substance and Sexual Activity    Alcohol use: Yes    Drug use: Yes     Types: Cocaine, Marijuana (Weed)     Social Drivers of Health     Intimate Partner Violence: Unknown (5/19/2025)    Received from [facility]    Humiliation, Afraid, Rape, and Kick questionnaire     Physically Abused: No       Prior to Admission

## 2025-05-22 PROBLEM — R78.81 POSITIVE BLOOD CULTURE: Status: ACTIVE | Noted: 2025-05-22

## 2025-05-22 LAB
ACB COMPLEX DNA BLD POS QL NAA+NON-PROBE: NOT DETECTED
ACCESSION NUMBER, LLC1M: ABNORMAL
ALBUMIN SERPL-MCNC: 2.7 G/DL (ref 3.4–5)
ALBUMIN/GLOB SERPL: 0.9 (ref 0.8–1.7)
ALP SERPL-CCNC: 102 U/L (ref 45–117)
ALT SERPL-CCNC: 19 U/L (ref 10–50)
ANION GAP SERPL CALC-SCNC: 11 MMOL/L (ref 3–18)
AST SERPL-CCNC: 22 U/L (ref 10–38)
B FRAGILIS DNA BLD POS QL NAA+NON-PROBE: NOT DETECTED
B PERT DNA SPEC QL NAA+PROBE: NOT DETECTED
BACTERIA SPEC CULT: NORMAL
BILIRUB SERPL-MCNC: 0.6 MG/DL (ref 0.2–1)
BIOFIRE TEST COMMENT: ABNORMAL
BORDETELLA PARAPERTUSSIS BY PCR: NOT DETECTED
BUN SERPL-MCNC: 14 MG/DL (ref 6–23)
BUN/CREAT SERPL: 23 (ref 12–20)
C ALBICANS DNA BLD POS QL NAA+NON-PROBE: NOT DETECTED
C AURIS DNA BLD POS QL NAA+NON-PROBE: NOT DETECTED
C GATTII+NEOFOR DNA BLD POS QL NAA+N-PRB: NOT DETECTED
C GLABRATA DNA BLD POS QL NAA+NON-PROBE: NOT DETECTED
C KRUSEI DNA BLD POS QL NAA+NON-PROBE: NOT DETECTED
C PARAP DNA BLD POS QL NAA+NON-PROBE: NOT DETECTED
C PNEUM DNA SPEC QL NAA+PROBE: NOT DETECTED
C TROPICLS DNA BLD POS QL NAA+NON-PROBE: NOT DETECTED
CALCIUM SERPL-MCNC: 8.2 MG/DL (ref 8.5–10.1)
CC UR VC: NORMAL
CHLORIDE SERPL-SCNC: 106 MMOL/L (ref 98–107)
CO2 SERPL-SCNC: 23 MMOL/L (ref 21–32)
CREAT SERPL-MCNC: 0.61 MG/DL (ref 0.6–1.3)
E CLOAC COMP DNA BLD POS NAA+NON-PROBE: NOT DETECTED
E COLI DNA BLD POS QL NAA+NON-PROBE: NOT DETECTED
E FAECALIS DNA BLD POS QL NAA+NON-PROBE: NOT DETECTED
E FAECIUM DNA BLD POS QL NAA+NON-PROBE: NOT DETECTED
ENTEROBACTERALES DNA BLD POS NAA+N-PRB: NOT DETECTED
ERYTHROCYTE [DISTWIDTH] IN BLOOD BY AUTOMATED COUNT: 18.6 % (ref 11.6–14.5)
FLUAV SUBTYP SPEC NAA+PROBE: NOT DETECTED
FLUBV RNA SPEC QL NAA+PROBE: NOT DETECTED
GLOBULIN SER CALC-MCNC: 3.1 G/DL (ref 2–4)
GLUCOSE SERPL-MCNC: 86 MG/DL (ref 74–108)
GP B STREP DNA BLD POS QL NAA+NON-PROBE: NOT DETECTED
HADV DNA SPEC QL NAA+PROBE: NOT DETECTED
HAEM INFLU DNA BLD POS QL NAA+NON-PROBE: NOT DETECTED
HCOV 229E RNA SPEC QL NAA+PROBE: NOT DETECTED
HCOV HKU1 RNA SPEC QL NAA+PROBE: NOT DETECTED
HCOV NL63 RNA SPEC QL NAA+PROBE: NOT DETECTED
HCOV OC43 RNA SPEC QL NAA+PROBE: NOT DETECTED
HCT VFR BLD AUTO: 35.2 % (ref 36–48)
HGB BLD-MCNC: 11.2 G/DL (ref 13–16)
HMPV RNA SPEC QL NAA+PROBE: NOT DETECTED
HPIV1 RNA SPEC QL NAA+PROBE: NOT DETECTED
HPIV2 RNA SPEC QL NAA+PROBE: NOT DETECTED
HPIV3 RNA SPEC QL NAA+PROBE: NOT DETECTED
HPIV4 RNA SPEC QL NAA+PROBE: NOT DETECTED
K OXYTOCA DNA BLD POS QL NAA+NON-PROBE: NOT DETECTED
KLEBSIELLA SP DNA BLD POS QL NAA+NON-PRB: NOT DETECTED
KLEBSIELLA SP DNA BLD POS QL NAA+NON-PRB: NOT DETECTED
L MONOCYTOG DNA BLD POS QL NAA+NON-PROBE: NOT DETECTED
M PNEUMO DNA SPEC QL NAA+PROBE: NOT DETECTED
MAGNESIUM SERPL-MCNC: 2.3 MG/DL (ref 1.6–2.6)
MCH RBC QN AUTO: 29.8 PG (ref 24–34)
MCHC RBC AUTO-ENTMCNC: 31.8 G/DL (ref 31–37)
MCV RBC AUTO: 93.6 FL (ref 78–100)
MECA+MECC ISLT/SPM QL: DETECTED
N MEN DNA BLD POS QL NAA+NON-PROBE: NOT DETECTED
NRBC # BLD: 0 K/UL (ref 0–0.01)
NRBC BLD-RTO: 0 PER 100 WBC
P AERUGINOSA DNA BLD POS NAA+NON-PROBE: NOT DETECTED
PLATELET # BLD AUTO: 225 K/UL (ref 135–420)
PMV BLD AUTO: 10.5 FL (ref 9.2–11.8)
POTASSIUM SERPL-SCNC: 3.8 MMOL/L (ref 3.5–5.5)
PROT SERPL-MCNC: 5.8 G/DL (ref 6.4–8.2)
PROTEUS SP DNA BLD POS QL NAA+NON-PROBE: NOT DETECTED
RBC # BLD AUTO: 3.76 M/UL (ref 4.35–5.65)
RESISTANT GENE TARGETS: ABNORMAL
RSV RNA SPEC QL NAA+PROBE: NOT DETECTED
RV+EV RNA SPEC QL NAA+PROBE: NOT DETECTED
S AUREUS DNA BLD POS QL NAA+NON-PROBE: NOT DETECTED
S AUREUS+CONS DNA BLD POS NAA+NON-PROBE: DETECTED
S EPIDERMIDIS DNA BLD POS QL NAA+NON-PRB: DETECTED
S LUGDUNENSIS DNA BLD POS QL NAA+NON-PRB: NOT DETECTED
S MALTOPHILIA DNA BLD POS QL NAA+NON-PRB: NOT DETECTED
S MARCESCENS DNA BLD POS NAA+NON-PROBE: NOT DETECTED
S PNEUM DNA BLD POS QL NAA+NON-PROBE: NOT DETECTED
S PYO DNA BLD POS QL NAA+NON-PROBE: NOT DETECTED
SALMONELLA DNA BLD POS QL NAA+NON-PROBE: NOT DETECTED
SARS-COV-2 RNA RESP QL NAA+PROBE: NOT DETECTED
SERVICE CMNT-IMP: NORMAL
SODIUM SERPL-SCNC: 139 MMOL/L (ref 136–145)
STREPTOCOCCUS DNA BLD POS NAA+NON-PROBE: DETECTED
VANCOMYCIN SERPL-MCNC: 29.1 UG/ML (ref 5–40)
WBC # BLD AUTO: 17 K/UL (ref 4.6–13.2)

## 2025-05-22 PROCEDURE — 87205 SMEAR GRAM STAIN: CPT

## 2025-05-22 PROCEDURE — 6360000002 HC RX W HCPCS: Performed by: INTERNAL MEDICINE

## 2025-05-22 PROCEDURE — 36415 COLL VENOUS BLD VENIPUNCTURE: CPT

## 2025-05-22 PROCEDURE — 1100000003 HC PRIVATE W/ TELEMETRY

## 2025-05-22 PROCEDURE — 87581 M.PNEUMON DNA AMP PROBE: CPT

## 2025-05-22 PROCEDURE — 2580000003 HC RX 258: Performed by: INTERNAL MEDICINE

## 2025-05-22 PROCEDURE — 85027 COMPLETE CBC AUTOMATED: CPT

## 2025-05-22 PROCEDURE — 80202 ASSAY OF VANCOMYCIN: CPT

## 2025-05-22 PROCEDURE — 87070 CULTURE OTHR SPECIMN AEROBIC: CPT

## 2025-05-22 PROCEDURE — 2500000003 HC RX 250 WO HCPCS: Performed by: INTERNAL MEDICINE

## 2025-05-22 PROCEDURE — 6370000000 HC RX 637 (ALT 250 FOR IP): Performed by: INTERNAL MEDICINE

## 2025-05-22 PROCEDURE — 80053 COMPREHEN METABOLIC PANEL: CPT

## 2025-05-22 PROCEDURE — 87040 BLOOD CULTURE FOR BACTERIA: CPT

## 2025-05-22 PROCEDURE — 87899 AGENT NOS ASSAY W/OPTIC: CPT

## 2025-05-22 PROCEDURE — 87449 NOS EACH ORGANISM AG IA: CPT

## 2025-05-22 PROCEDURE — 83735 ASSAY OF MAGNESIUM: CPT

## 2025-05-22 RX ORDER — DOXYCYCLINE 100 MG/1
100 CAPSULE ORAL EVERY 12 HOURS SCHEDULED
Status: DISCONTINUED | OUTPATIENT
Start: 2025-05-22 | End: 2025-05-25 | Stop reason: HOSPADM

## 2025-05-22 RX ADMIN — GABAPENTIN 300 MG: 300 CAPSULE ORAL at 20:14

## 2025-05-22 RX ADMIN — ACETAMINOPHEN 650 MG: 325 TABLET ORAL at 17:52

## 2025-05-22 RX ADMIN — DOXYCYCLINE 100 MG: 100 CAPSULE ORAL at 20:14

## 2025-05-22 RX ADMIN — ACETAMINOPHEN 650 MG: 325 TABLET ORAL at 08:44

## 2025-05-22 RX ADMIN — VANCOMYCIN HYDROCHLORIDE 1250 MG: 10 INJECTION, POWDER, LYOPHILIZED, FOR SOLUTION INTRAVENOUS at 03:38

## 2025-05-22 RX ADMIN — GABAPENTIN 300 MG: 300 CAPSULE ORAL at 08:44

## 2025-05-22 RX ADMIN — SERTRALINE HYDROCHLORIDE 50 MG: 50 TABLET ORAL at 08:44

## 2025-05-22 RX ADMIN — FLUTICASONE PROPIONATE 2 SPRAY: 50 SPRAY, METERED NASAL at 12:25

## 2025-05-22 RX ADMIN — APIXABAN 5 MG: 5 TABLET, FILM COATED ORAL at 20:14

## 2025-05-22 RX ADMIN — CEFEPIME 2000 MG: 2 INJECTION, POWDER, FOR SOLUTION INTRAVENOUS at 12:14

## 2025-05-22 RX ADMIN — SODIUM CHLORIDE, PRESERVATIVE FREE 10 ML: 5 INJECTION INTRAVENOUS at 20:21

## 2025-05-22 RX ADMIN — GABAPENTIN 300 MG: 300 CAPSULE ORAL at 14:59

## 2025-05-22 RX ADMIN — CEFEPIME 2000 MG: 2 INJECTION, POWDER, FOR SOLUTION INTRAVENOUS at 05:09

## 2025-05-22 RX ADMIN — ACETAMINOPHEN 650 MG: 325 TABLET ORAL at 12:14

## 2025-05-22 RX ADMIN — APIXABAN 5 MG: 5 TABLET, FILM COATED ORAL at 08:44

## 2025-05-22 RX ADMIN — WATER 2000 MG: 1 INJECTION INTRAMUSCULAR; INTRAVENOUS; SUBCUTANEOUS at 20:14

## 2025-05-22 ASSESSMENT — PAIN SCALES - GENERAL
PAINLEVEL_OUTOF10: 0

## 2025-05-22 NOTE — PLAN OF CARE
Problem: Discharge Planning  Goal: Discharge to home or other facility with appropriate resources  5/21/2025 2313 by Ken George RN  Outcome: Progressing     Problem: Skin/Tissue Integrity  Goal: Skin integrity remains intact  Description: 1.  Monitor for areas of redness and/or skin breakdown2.  Assess vascular access sites hourly3.  Every 4-6 hours minimum:  Change oxygen saturation probe site4.  Every 4-6 hours:  If on nasal continuous positive airway pressure, respiratory therapy assess nares and determine need for appliance change or resting period  5/22/2025 1144 by Yulisa Renae RN  Flowsheets (Taken 5/22/2025 1144)  Skin Integrity Remains Intact:   Monitor for areas of redness and/or skin breakdown   Turn and reposition as indicated   Check visual cues for pain  5/21/2025 2313 by Ken George RN  Outcome: Progressing  Flowsheets (Taken 5/21/2025 2100)  Skin Integrity Remains Intact:   Monitor for areas of redness and/or skin breakdown   Assess vascular access sites hourly   Every 4-6 hours minimum:  Change oxygen saturation probe site   Turn and reposition as indicated     Problem: Safety - Adult  Goal: Free from fall injury  5/21/2025 2313 by Ken George, RN  Outcome: Progressing     Problem: Pain  Goal: Verbalizes/displays adequate comfort level or baseline comfort level  5/21/2025 2313 by Ken George RN  Outcome: Progressing

## 2025-05-22 NOTE — SUBJECTIVE & OBJECTIVE
Subjective:     ***    Objective:     Vitals:    05/22/25 1100 05/22/25 1300 05/22/25 1500 05/22/25 1530   BP:    129/81   Pulse: 83 81 84 85   Resp:    18   Temp:    97.5 °F (36.4 °C)   TempSrc:    Oral   SpO2:    100%   Weight:       Height:            Intake andOutput:  Current Shift: No intake/output data recorded.  Last three shifts: 05/20 1901 - 05/22 0700  In: 480 [P.O.:480]  Out: 1000 [Urine:1000]     Physical Exam      Medications:  Current Facility-Administered Medications   Medication Dose Route Frequency    ceFEPIme (MAXIPIME) 2,000 mg in sodium chloride 0.9 % 100 mL IVPB (addEASE)  2,000 mg IntraVENous Q8H    sodium chloride flush 0.9 % injection 5-40 mL  5-40 mL IntraVENous 2 times per day    sodium chloride flush 0.9 % injection 5-40 mL  5-40 mL IntraVENous PRN    0.9 % sodium chloride infusion   IntraVENous PRN    acetaminophen (TYLENOL) tablet 650 mg  650 mg Oral Q6H PRN    Or    acetaminophen (TYLENOL) suppository 650 mg  650 mg Rectal Q6H PRN    apixaban (ELIQUIS) tablet 5 mg  5 mg Oral BID    fluticasone (FLONASE) 50 MCG/ACT nasal spray 2 spray  2 spray Each Nostril Daily    sertraline (ZOLOFT) tablet 50 mg  50 mg Oral Daily    acetaminophen (TYLENOL) tablet 650 mg  650 mg Oral TID WC    nicotine (NICODERM CQ) 21 MG/24HR 1 patch  1 patch TransDERmal Daily    gabapentin (NEURONTIN) capsule 300 mg  300 mg Oral TID        Medications Reviewed    :

## 2025-05-22 NOTE — PLAN OF CARE
Problem: Discharge Planning  Goal: Discharge to home or other facility with appropriate resources  5/21/2025 2313 by eKn George RN  Outcome: Progressing  5/21/2025 1551 by Carmen Guzman RN  Outcome: Progressing  Flowsheets (Taken 5/21/2025 1455)  Discharge to home or other facility with appropriate resources:   Identify barriers to discharge with patient and caregiver   Arrange for needed discharge resources and transportation as appropriate   Identify discharge learning needs (meds, wound care, etc)     Problem: Skin/Tissue Integrity  Goal: Skin integrity remains intact  Description: 1.  Monitor for areas of redness and/or skin breakdown2.  Assess vascular access sites hourly3.  Every 4-6 hours minimum:  Change oxygen saturation probe site4.  Every 4-6 hours:  If on nasal continuous positive airway pressure, respiratory therapy assess nares and determine need for appliance change or resting period  5/21/2025 2313 by Ken George RN  Outcome: Progressing  Flowsheets (Taken 5/21/2025 2100)  Skin Integrity Remains Intact:   Monitor for areas of redness and/or skin breakdown   Assess vascular access sites hourly   Every 4-6 hours minimum:  Change oxygen saturation probe site   Turn and reposition as indicated  5/21/2025 1551 by Carmen Guzman, RN  Outcome: Progressing     Problem: Safety - Adult  Goal: Free from fall injury  5/21/2025 2313 by Ken George RN  Outcome: Progressing  5/21/2025 1551 by Carmen Guzman, RN  Outcome: Progressing     Problem: Pain  Goal: Verbalizes/displays adequate comfort level or baseline comfort level  Outcome: Progressing

## 2025-05-22 NOTE — CARE COORDINATION
05/22/25 1401   Service Assessment   Patient Orientation Alert and Oriented   Cognition Alert   History Provided By Patient   Primary Caregiver Self   Accompanied By/Relationship N/A   Support Systems Friends/Neighbors   PCP Verified by CM No  (Will need PCP)   Prior Functional Level Independent in ADLs/IADLs   Current Functional Level Independent in ADLs/IADLs   Can patient return to prior living arrangement Yes   Ability to make needs known: Good   Family able to assist with home care needs: Yes   Would you like for me to discuss the discharge plan with any other family members/significant others, and if so, who? Yes   Financial Resources Medicare   Social/Functional History   Type of Home Homeless   Home Equipment Wheelchair - Manual   Prior Level of Assist for ADLs Independent   Prior Level of Assist for Homemaking Independent   Ambulation Assistance Independent   Prior Level of Assist for Transfers Independent   Active  No   Occupation Unemployed   Discharge Planning   Type of Residence Homeless   Living Arrangements Alone   Current Services Prior To Admission None   Potential Assistance Needed Skilled Nursing Facility   DME Ordered? No   Potential Assistance Purchasing Medications No   Type of Home Care Services None   Patient expects to be discharged to: Skilled nursing facility   History of falls? 0   Services At/After Discharge   Transition of Care Consult (CM Consult) Discharge Planning;SNF   Services At/After Discharge Skilled Nursing Facility (SNF)   Mode of Transport at Discharge BLS   Confirm Follow Up Transport Self   Condition of Participation: Discharge Planning   The Plan for Transition of Care is related to the following treatment goals: Homeless shelter vs SNF   The Patient and/or Patient Representative was provided with a Choice of Provider? Patient   The Patient and/Or Patient Representative agree with the Discharge Plan? Yes   Freedom of Choice list was provided with basic dialogue

## 2025-05-22 NOTE — CONSULTS
[facility]  (A Division of [facility])  Kami Kilpatrick MD   Office Tel:  [phone] Option #8                                                               Date of Admission: 5/20/2025       ID Consult for antimicrobial management of pneumonia requested by Dr De La Torre     PCP: No, Pcp    C/C: cough/left shoulder pain    Current Antimicrobials:    Prior Antimicrobials:    Vanco 5/21- to date  Cefepime 5/21- to date   NA     Allergy to antibiotics: NA     Assessment:     Bacteremia- CoNS and Strep( MRSE/non pneumococcus strep by biofire)-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 No acute intra-abdominal  abnormality. Prostate enlargement.     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   DW Dr De La Torre    Thank you for involving me in the care of this patient. Please do not hesitate to contact me if question or concern.          HPI:      Lamont Naranjo is a 64 y.o. male with PMH of Tularemia with septic shock- that led to loss of fingers on both hand and BKA-BL-2017, smoker, had multiple ED visit at [facility] last week due to left shoudler pain.    He also has 
Pt seen and examined, full note to follow:        PNA  Sepsis  Active smoker    Bacteremia with CONS( MRSE) and streptococcus -low garde-( non S.pneumococcus)    Give history of tularemia sepsis 2017 with septic shock- ampuation of fingers/BL BKA due to that    Plan:  Urine antigen -legionella/pnemococcus  Sputum culture- stat  DW nursing    Cont emperic PNA coverage with ceftiraxone and doxy- can DC cefepime and vanco  CoNS bacteremia- is procurement contamination    Thanks    Thanks  Kami Kilpatrick MD  [facility])  Office: [phone] -Option #8  Office fax:  [phone]     
Due on 5/22/25 @ 0600, 1 each, Other, Once, You Velasco DO    0.9 % sodium chloride infusion, , IntraVENous, Continuous, You Velasco DO, Last Rate: 75 mL/hr at 05/21/25 1527, New Bag at 05/21/25 1527    vancomycin (VANCOCIN) 1250 mg in sodium chloride 0.9% 250 mL IVPB, 1,250 mg, IntraVENous, Q12H, You Velasco DO, Last Rate: 166.7 mL/hr at 05/21/25 1528, 1,250 mg at 05/21/25 1528     History of Brain Trauma:   Denies a previous history of brain injury.       Social History:   Patient reports he was born in [place], since 1978 he has been living in [facility].  He reports high school education.  He said he grew up in several places that his father was in , he said he spent 3 years in [place].  Patient said he served in [employer] briefly and also that he is  and connected to the [facility] but he is unsure of the benefits.  Patient is seeking help with the homelessness. Family History:  History reviewed. No pertinent family history.    Family Psych History:   The patient denies a known family history of psychiatric admissions or suicide attempts.     Legal History: Denies Any      Neurological:   Not indicated     Vital Signs:   [unfilled]    Data Review:  1-1 [unfilled]  2-1. [unfilled]  3-1. [unfilled]    Mental Status Exam:  APPEARANCE:   Patient is moderately built elderly male, appears stated age, he is a double amputee, several digits in both hands missing.  He presents awake alert, oriented, cooperative, pleasant in conversation  Speech:  Speech is spontaneous, Normal tone an Volume   MOOD: Described \"feeling okay\"  AFFECT: Normal, Appropriate, Mood congruent   THOUGHT PROCESS:  Appears to be linear, goal-directed  THOUGHT CONTENT:  With no suicidal or homicidal ideations voiced.  Paranoia present.   PERCEPTUAL DISTURBANCES:  The patient denies any hallucinations or delusions.   INSIGHT:  Fair  JUDGMENT:  Fair    IMPULSE CONTROL:  POOR/ Fair.

## 2025-05-23 LAB
ANION GAP SERPL CALC-SCNC: 10 MMOL/L (ref 3–18)
BACTERIA SPEC CULT: ABNORMAL
BACTERIA SPEC CULT: ABNORMAL
BACTERIA SPEC CULT: NORMAL
BACTERIA SPEC CULT: NORMAL
BUN SERPL-MCNC: 9 MG/DL (ref 6–23)
BUN/CREAT SERPL: 15 (ref 12–20)
CALCIUM SERPL-MCNC: 8.9 MG/DL (ref 8.5–10.1)
CHLORIDE SERPL-SCNC: 106 MMOL/L (ref 98–107)
CO2 SERPL-SCNC: 24 MMOL/L (ref 21–32)
CREAT SERPL-MCNC: 0.61 MG/DL (ref 0.6–1.3)
ERYTHROCYTE [DISTWIDTH] IN BLOOD BY AUTOMATED COUNT: 18.6 % (ref 11.6–14.5)
GLUCOSE SERPL-MCNC: 82 MG/DL (ref 74–108)
GRAM STN SPEC: ABNORMAL
GRAM STN SPEC: ABNORMAL
HCT VFR BLD AUTO: 35.4 % (ref 36–48)
HGB BLD-MCNC: 11.4 G/DL (ref 13–16)
L PNEUMO AG UR QL: NEGATIVE
L PNEUMO AG UR QL: NEGATIVE
MAGNESIUM SERPL-MCNC: 2.2 MG/DL (ref 1.6–2.6)
MCH RBC QN AUTO: 30.2 PG (ref 24–34)
MCHC RBC AUTO-ENTMCNC: 32.2 G/DL (ref 31–37)
MCV RBC AUTO: 93.9 FL (ref 78–100)
NEGATIVE CONTROL: NEGATIVE
NEGATIVE CONTROL: NEGATIVE
NRBC # BLD: 0 K/UL (ref 0–0.01)
NRBC BLD-RTO: 0 PER 100 WBC
PH, URINE: 5 (ref 4.6–8)
PH, URINE: 5 (ref 4.6–8)
PLATELET # BLD AUTO: 239 K/UL (ref 135–420)
PMV BLD AUTO: 10.3 FL (ref 9.2–11.8)
POSITIVE CONTROL: POSITIVE
POSITIVE CONTROL: POSITIVE
POTASSIUM SERPL-SCNC: 3.7 MMOL/L (ref 3.5–5.5)
PROCALCITONIN SERPL-MCNC: 5.56 NG/ML
RBC # BLD AUTO: 3.77 M/UL (ref 4.35–5.65)
S PNEUM AG UR QL IA.RAPID: NEGATIVE
S PNEUM AG UR QL IA.RAPID: NEGATIVE
SERVICE CMNT-IMP: ABNORMAL
SERVICE CMNT-IMP: NORMAL
SODIUM SERPL-SCNC: 140 MMOL/L (ref 136–145)
WBC # BLD AUTO: 11.6 K/UL (ref 4.6–13.2)

## 2025-05-23 PROCEDURE — 83735 ASSAY OF MAGNESIUM: CPT

## 2025-05-23 PROCEDURE — 6360000002 HC RX W HCPCS: Performed by: INTERNAL MEDICINE

## 2025-05-23 PROCEDURE — 6370000000 HC RX 637 (ALT 250 FOR IP): Performed by: HOSPITALIST

## 2025-05-23 PROCEDURE — 2500000003 HC RX 250 WO HCPCS: Performed by: INTERNAL MEDICINE

## 2025-05-23 PROCEDURE — 85027 COMPLETE CBC AUTOMATED: CPT

## 2025-05-23 PROCEDURE — 6370000000 HC RX 637 (ALT 250 FOR IP): Performed by: INTERNAL MEDICINE

## 2025-05-23 PROCEDURE — 84145 PROCALCITONIN (PCT): CPT

## 2025-05-23 PROCEDURE — 36415 COLL VENOUS BLD VENIPUNCTURE: CPT

## 2025-05-23 PROCEDURE — 80048 BASIC METABOLIC PNL TOTAL CA: CPT

## 2025-05-23 PROCEDURE — 1100000003 HC PRIVATE W/ TELEMETRY

## 2025-05-23 RX ADMIN — ACETAMINOPHEN 650 MG: 325 TABLET ORAL at 11:42

## 2025-05-23 RX ADMIN — ACETAMINOPHEN 650 MG: 325 TABLET ORAL at 18:30

## 2025-05-23 RX ADMIN — WATER 2000 MG: 1 INJECTION INTRAMUSCULAR; INTRAVENOUS; SUBCUTANEOUS at 18:30

## 2025-05-23 RX ADMIN — SODIUM CHLORIDE, PRESERVATIVE FREE 10 ML: 5 INJECTION INTRAVENOUS at 08:19

## 2025-05-23 RX ADMIN — SERTRALINE HYDROCHLORIDE 50 MG: 50 TABLET ORAL at 08:14

## 2025-05-23 RX ADMIN — DOXYCYCLINE 100 MG: 100 CAPSULE ORAL at 21:31

## 2025-05-23 RX ADMIN — GABAPENTIN 300 MG: 300 CAPSULE ORAL at 13:30

## 2025-05-23 RX ADMIN — FLUTICASONE PROPIONATE 2 SPRAY: 50 SPRAY, METERED NASAL at 08:16

## 2025-05-23 RX ADMIN — SODIUM CHLORIDE, PRESERVATIVE FREE 10 ML: 5 INJECTION INTRAVENOUS at 21:32

## 2025-05-23 RX ADMIN — APIXABAN 5 MG: 5 TABLET, FILM COATED ORAL at 21:32

## 2025-05-23 RX ADMIN — GABAPENTIN 300 MG: 300 CAPSULE ORAL at 21:32

## 2025-05-23 RX ADMIN — DOXYCYCLINE 100 MG: 100 CAPSULE ORAL at 08:14

## 2025-05-23 RX ADMIN — ACETAMINOPHEN 650 MG: 325 TABLET ORAL at 03:52

## 2025-05-23 RX ADMIN — GABAPENTIN 300 MG: 300 CAPSULE ORAL at 08:14

## 2025-05-23 RX ADMIN — APIXABAN 5 MG: 5 TABLET, FILM COATED ORAL at 08:14

## 2025-05-23 RX ADMIN — BENZOCAINE AND MENTHOL 1 LOZENGE: 15; 3.6 LOZENGE ORAL at 13:27

## 2025-05-23 ASSESSMENT — PAIN SCALES - GENERAL
PAINLEVEL_OUTOF10: 9
PAINLEVEL_OUTOF10: 0
PAINLEVEL_OUTOF10: 0
PAINLEVEL_OUTOF10: 6

## 2025-05-23 ASSESSMENT — PAIN DESCRIPTION - DESCRIPTORS: DESCRIPTORS: ACHING

## 2025-05-23 ASSESSMENT — PAIN DESCRIPTION - LOCATION: LOCATION: SHOULDER

## 2025-05-23 ASSESSMENT — PAIN DESCRIPTION - ORIENTATION: ORIENTATION: LEFT

## 2025-05-23 ASSESSMENT — PAIN - FUNCTIONAL ASSESSMENT: PAIN_FUNCTIONAL_ASSESSMENT: ACTIVITIES ARE NOT PREVENTED

## 2025-05-23 ASSESSMENT — PAIN SCALES - WONG BAKER: WONGBAKER_NUMERICALRESPONSE: HURTS A LITTLE BIT

## 2025-05-23 NOTE — PLAN OF CARE
Problem: Discharge Planning  Goal: Discharge to home or other facility with appropriate resources  5/23/2025 1104 by Misha Liu RN  Outcome: Progressing  Flowsheets (Taken 5/23/2025 0800)  Discharge to home or other facility with appropriate resources:   Identify barriers to discharge with patient and caregiver   Arrange for needed discharge resources and transportation as appropriate  5/22/2025 2251 by Ken George RN  Outcome: Progressing     Problem: Skin/Tissue Integrity  Goal: Skin integrity remains intact  Description: 1.  Monitor for areas of redness and/or skin breakdown2.  Assess vascular access sites hourly3.  Every 4-6 hours minimum:  Change oxygen saturation probe site4.  Every 4-6 hours:  If on nasal continuous positive airway pressure, respiratory therapy assess nares and determine need for appliance change or resting period  5/23/2025 1104 by Misha Liu RN  Outcome: Progressing  Flowsheets (Taken 5/23/2025 0800)  Skin Integrity Remains Intact: Monitor for areas of redness and/or skin breakdown  5/22/2025 2251 by Ken George, RN  Outcome: Progressing     Problem: Safety - Adult  Goal: Free from fall injury  5/23/2025 1104 by Misha Liu RN  Outcome: Progressing  5/22/2025 2251 by Ken George RN  Outcome: Progressing     Problem: Pain  Goal: Verbalizes/displays adequate comfort level or baseline comfort level  5/23/2025 1104 by Misha Liu RN  Outcome: Progressing  Flowsheets (Taken 5/23/2025 0712)  Verbalizes/displays adequate comfort level or baseline comfort level:   Encourage patient to monitor pain and request assistance   Assess pain using appropriate pain scale  5/22/2025 2251 by Ken George, RN  Outcome: Progressing

## 2025-05-23 NOTE — PLAN OF CARE
Problem: Discharge Planning  Goal: Discharge to home or other facility with appropriate resources  Outcome: Progressing     Problem: Skin/Tissue Integrity  Goal: Skin integrity remains intact  Description: 1.  Monitor for areas of redness and/or skin breakdown2.  Assess vascular access sites hourly3.  Every 4-6 hours minimum:  Change oxygen saturation probe site4.  Every 4-6 hours:  If on nasal continuous positive airway pressure, respiratory therapy assess nares and determine need for appliance change or resting period  5/22/2025 2251 by Ken George, RN  Outcome: Progressing  5/22/2025 1144 by Yulisa Renae, RN  Flowsheets (Taken 5/22/2025 1144)  Skin Integrity Remains Intact:   Monitor for areas of redness and/or skin breakdown   Turn and reposition as indicated   Check visual cues for pain     Problem: Safety - Adult  Goal: Free from fall injury  Outcome: Progressing     Problem: Pain  Goal: Verbalizes/displays adequate comfort level or baseline comfort level  Outcome: Progressing

## 2025-05-24 LAB
ANION GAP SERPL CALC-SCNC: 13 MMOL/L (ref 3–18)
BUN SERPL-MCNC: 10 MG/DL (ref 6–23)
BUN/CREAT SERPL: 13 (ref 12–20)
CALCIUM SERPL-MCNC: 9 MG/DL (ref 8.5–10.1)
CHLORIDE SERPL-SCNC: 103 MMOL/L (ref 98–107)
CO2 SERPL-SCNC: 23 MMOL/L (ref 21–32)
CREAT SERPL-MCNC: 0.71 MG/DL (ref 0.6–1.3)
ERYTHROCYTE [DISTWIDTH] IN BLOOD BY AUTOMATED COUNT: 18.2 % (ref 11.6–14.5)
GLUCOSE SERPL-MCNC: 125 MG/DL (ref 74–108)
HCT VFR BLD AUTO: 37.5 % (ref 36–48)
HGB BLD-MCNC: 11.8 G/DL (ref 13–16)
M PNEUMO DNA SPEC QL NAA+PROBE: NEGATIVE
MAGNESIUM SERPL-MCNC: 2.1 MG/DL (ref 1.6–2.6)
MCH RBC QN AUTO: 29.9 PG (ref 24–34)
MCHC RBC AUTO-ENTMCNC: 31.5 G/DL (ref 31–37)
MCV RBC AUTO: 95.2 FL (ref 78–100)
NRBC # BLD: 0 K/UL (ref 0–0.01)
NRBC BLD-RTO: 0 PER 100 WBC
PLATELET # BLD AUTO: 284 K/UL (ref 135–420)
PMV BLD AUTO: 10.6 FL (ref 9.2–11.8)
POTASSIUM SERPL-SCNC: 4 MMOL/L (ref 3.5–5.5)
PROCALCITONIN SERPL-MCNC: 2.84 NG/ML
RBC # BLD AUTO: 3.94 M/UL (ref 4.35–5.65)
SODIUM SERPL-SCNC: 139 MMOL/L (ref 136–145)
SPECIMEN SOURCE: NORMAL
WBC # BLD AUTO: 9.7 K/UL (ref 4.6–13.2)

## 2025-05-24 PROCEDURE — 2500000003 HC RX 250 WO HCPCS: Performed by: HOSPITALIST

## 2025-05-24 PROCEDURE — 83735 ASSAY OF MAGNESIUM: CPT

## 2025-05-24 PROCEDURE — 6370000000 HC RX 637 (ALT 250 FOR IP): Performed by: INTERNAL MEDICINE

## 2025-05-24 PROCEDURE — 6370000000 HC RX 637 (ALT 250 FOR IP): Performed by: HOSPITALIST

## 2025-05-24 PROCEDURE — 6360000002 HC RX W HCPCS: Performed by: HOSPITALIST

## 2025-05-24 PROCEDURE — 85027 COMPLETE CBC AUTOMATED: CPT

## 2025-05-24 PROCEDURE — 80048 BASIC METABOLIC PNL TOTAL CA: CPT

## 2025-05-24 PROCEDURE — 2500000003 HC RX 250 WO HCPCS: Performed by: INTERNAL MEDICINE

## 2025-05-24 PROCEDURE — 1100000003 HC PRIVATE W/ TELEMETRY

## 2025-05-24 PROCEDURE — 36415 COLL VENOUS BLD VENIPUNCTURE: CPT

## 2025-05-24 PROCEDURE — 84145 PROCALCITONIN (PCT): CPT

## 2025-05-24 PROCEDURE — 94640 AIRWAY INHALATION TREATMENT: CPT

## 2025-05-24 PROCEDURE — 6360000002 HC RX W HCPCS: Performed by: INTERNAL MEDICINE

## 2025-05-24 RX ORDER — AMLODIPINE BESYLATE 5 MG/1
5 TABLET ORAL DAILY
Status: DISCONTINUED | OUTPATIENT
Start: 2025-05-24 | End: 2025-05-25 | Stop reason: HOSPADM

## 2025-05-24 RX ORDER — HYDRALAZINE HYDROCHLORIDE 20 MG/ML
10 INJECTION INTRAMUSCULAR; INTRAVENOUS EVERY 6 HOURS PRN
Status: DISCONTINUED | OUTPATIENT
Start: 2025-05-24 | End: 2025-05-25 | Stop reason: HOSPADM

## 2025-05-24 RX ORDER — BUDESONIDE 0.25 MG/2ML
0.25 INHALANT ORAL
Status: DISCONTINUED | OUTPATIENT
Start: 2025-05-24 | End: 2025-05-25 | Stop reason: HOSPADM

## 2025-05-24 RX ADMIN — SERTRALINE HYDROCHLORIDE 50 MG: 50 TABLET ORAL at 09:21

## 2025-05-24 RX ADMIN — SODIUM CHLORIDE, PRESERVATIVE FREE 10 ML: 5 INJECTION INTRAVENOUS at 09:23

## 2025-05-24 RX ADMIN — GABAPENTIN 300 MG: 300 CAPSULE ORAL at 19:57

## 2025-05-24 RX ADMIN — ACETAMINOPHEN 650 MG: 325 TABLET ORAL at 06:55

## 2025-05-24 RX ADMIN — IPRATROPIUM BROMIDE 0.5 MG: 0.5 SOLUTION RESPIRATORY (INHALATION) at 12:44

## 2025-05-24 RX ADMIN — METHYLPREDNISOLONE SODIUM SUCCINATE 40 MG: 40 INJECTION INTRAMUSCULAR; INTRAVENOUS at 12:39

## 2025-05-24 RX ADMIN — WATER 2000 MG: 1 INJECTION INTRAMUSCULAR; INTRAVENOUS; SUBCUTANEOUS at 20:00

## 2025-05-24 RX ADMIN — BUDESONIDE 250 MCG: 0.25 INHALANT RESPIRATORY (INHALATION) at 12:44

## 2025-05-24 RX ADMIN — APIXABAN 5 MG: 5 TABLET, FILM COATED ORAL at 19:57

## 2025-05-24 RX ADMIN — BUDESONIDE 250 MCG: 0.25 INHALANT RESPIRATORY (INHALATION) at 20:41

## 2025-05-24 RX ADMIN — BENZOCAINE AND MENTHOL 1 LOZENGE: 15; 3.6 LOZENGE ORAL at 06:56

## 2025-05-24 RX ADMIN — GABAPENTIN 300 MG: 300 CAPSULE ORAL at 09:21

## 2025-05-24 RX ADMIN — SODIUM CHLORIDE, PRESERVATIVE FREE 10 ML: 5 INJECTION INTRAVENOUS at 20:11

## 2025-05-24 RX ADMIN — HYDRALAZINE HYDROCHLORIDE 10 MG: 20 INJECTION INTRAMUSCULAR; INTRAVENOUS at 17:23

## 2025-05-24 RX ADMIN — IPRATROPIUM BROMIDE 0.5 MG: 0.5 SOLUTION RESPIRATORY (INHALATION) at 20:42

## 2025-05-24 RX ADMIN — METHYLPREDNISOLONE SODIUM SUCCINATE 40 MG: 40 INJECTION INTRAMUSCULAR; INTRAVENOUS at 22:30

## 2025-05-24 RX ADMIN — DOXYCYCLINE 100 MG: 100 CAPSULE ORAL at 19:57

## 2025-05-24 RX ADMIN — DOXYCYCLINE 100 MG: 100 CAPSULE ORAL at 09:21

## 2025-05-24 RX ADMIN — APIXABAN 5 MG: 5 TABLET, FILM COATED ORAL at 09:22

## 2025-05-24 RX ADMIN — FLUTICASONE PROPIONATE 2 SPRAY: 50 SPRAY, METERED NASAL at 09:22

## 2025-05-24 RX ADMIN — IPRATROPIUM BROMIDE 0.5 MG: 0.5 SOLUTION RESPIRATORY (INHALATION) at 15:57

## 2025-05-24 RX ADMIN — ACETAMINOPHEN 650 MG: 325 TABLET ORAL at 16:25

## 2025-05-24 RX ADMIN — AMLODIPINE BESYLATE 5 MG: 5 TABLET ORAL at 17:23

## 2025-05-24 RX ADMIN — ACETAMINOPHEN 650 MG: 325 TABLET ORAL at 12:39

## 2025-05-24 RX ADMIN — BENZOCAINE AND MENTHOL 1 LOZENGE: 15; 3.6 LOZENGE ORAL at 16:24

## 2025-05-24 ASSESSMENT — PAIN SCALES - GENERAL
PAINLEVEL_OUTOF10: 8
PAINLEVEL_OUTOF10: 3
PAINLEVEL_OUTOF10: 7
PAINLEVEL_OUTOF10: 3

## 2025-05-24 ASSESSMENT — PAIN DESCRIPTION - LOCATION
LOCATION: GENERALIZED;THROAT
LOCATION: GENERALIZED
LOCATION: THROAT
LOCATION: GENERALIZED
LOCATION: GENERALIZED

## 2025-05-24 ASSESSMENT — PAIN - FUNCTIONAL ASSESSMENT: PAIN_FUNCTIONAL_ASSESSMENT: ACTIVITIES ARE NOT PREVENTED

## 2025-05-24 ASSESSMENT — PAIN DESCRIPTION - DESCRIPTORS: DESCRIPTORS: DISCOMFORT;ACHING

## 2025-05-24 NOTE — PLAN OF CARE
Problem: Discharge Planning  Goal: Discharge to home or other facility with appropriate resources  5/24/2025 1045 by Matilde Mooney RN  Outcome: Progressing  Flowsheets (Taken 5/24/2025 0915)  Discharge to home or other facility with appropriate resources:   Identify barriers to discharge with patient and caregiver   Arrange for needed discharge resources and transportation as appropriate   Identify discharge learning needs (meds, wound care, etc)  5/23/2025 2256 by Danelle Early RN  Outcome: Progressing  Flowsheets (Taken 5/23/2025 2000)  Discharge to home or other facility with appropriate resources:   Identify barriers to discharge with patient and caregiver   Arrange for needed discharge resources and transportation as appropriate     Problem: Skin/Tissue Integrity  Goal: Skin integrity remains intact  Description: 1.  Monitor for areas of redness and/or skin breakdown2.  Assess vascular access sites hourly3.  Every 4-6 hours minimum:  Change oxygen saturation probe site4.  Every 4-6 hours:  If on nasal continuous positive airway pressure, respiratory therapy assess nares and determine need for appliance change or resting period  5/24/2025 1045 by Matilde Mooney RN  Outcome: Progressing  Flowsheets (Taken 5/24/2025 0915)  Skin Integrity Remains Intact: Monitor for areas of redness and/or skin breakdown  5/23/2025 2256 by Danelle Early RN  Outcome: Progressing  Flowsheets (Taken 5/23/2025 2000)  Skin Integrity Remains Intact: Monitor for areas of redness and/or skin breakdown     Problem: Safety - Adult  Goal: Free from fall injury  5/24/2025 1045 by Matilde Mooney RN  Outcome: Progressing  5/23/2025 2256 by Danelle Early RN  Outcome: Progressing     Problem: Pain  Goal: Verbalizes/displays adequate comfort level or baseline comfort level  5/24/2025 1045 by Matilde Mooney RN  Outcome: Progressing  5/23/2025 2256 by Danelle Early, RN  Outcome: Progressing

## 2025-05-24 NOTE — PLAN OF CARE
Problem: Discharge Planning  Goal: Discharge to home or other facility with appropriate resources  5/23/2025 2256 by Danelle Early RN  Outcome: Progressing  Flowsheets (Taken 5/23/2025 2000)  Discharge to home or other facility with appropriate resources:   Identify barriers to discharge with patient and caregiver   Arrange for needed discharge resources and transportation as appropriate  5/23/2025 1104 by Misha Liu RN  Outcome: Progressing  Flowsheets (Taken 5/23/2025 0800)  Discharge to home or other facility with appropriate resources:   Identify barriers to discharge with patient and caregiver   Arrange for needed discharge resources and transportation as appropriate     Problem: Skin/Tissue Integrity  Goal: Skin integrity remains intact  Description: 1.  Monitor for areas of redness and/or skin breakdown2.  Assess vascular access sites hourly3.  Every 4-6 hours minimum:  Change oxygen saturation probe site4.  Every 4-6 hours:  If on nasal continuous positive airway pressure, respiratory therapy assess nares and determine need for appliance change or resting period  5/23/2025 2256 by Danelle Early RN  Outcome: Progressing  Flowsheets (Taken 5/23/2025 2000)  Skin Integrity Remains Intact: Monitor for areas of redness and/or skin breakdown  5/23/2025 1104 by Misha Liu RN  Outcome: Progressing  Flowsheets (Taken 5/23/2025 0800)  Skin Integrity Remains Intact: Monitor for areas of redness and/or skin breakdown     Problem: Safety - Adult  Goal: Free from fall injury  5/23/2025 2256 by Danelle Early, RN  Outcome: Progressing  5/23/2025 1104 by Misha Liu RN  Outcome: Progressing     Problem: Pain  Goal: Verbalizes/displays adequate comfort level or baseline comfort level  5/23/2025 2256 by Danelle Early RN  Outcome: Progressing  5/23/2025 1104 by Misha Liu RN  Outcome: Progressing  Flowsheets  Taken 5/23/2025 1100  Verbalizes/displays adequate comfort level or baseline comfort level:

## 2025-05-25 VITALS
DIASTOLIC BLOOD PRESSURE: 86 MMHG | OXYGEN SATURATION: 99 % | HEART RATE: 119 BPM | BODY MASS INDEX: 31.34 KG/M2 | SYSTOLIC BLOOD PRESSURE: 154 MMHG | TEMPERATURE: 97.8 F | HEIGHT: 60 IN | WEIGHT: 159.61 LBS | RESPIRATION RATE: 18 BRPM

## 2025-05-25 LAB
ANION GAP SERPL CALC-SCNC: 15 MMOL/L (ref 3–18)
BACTERIA SPEC CULT: NORMAL
BUN SERPL-MCNC: 11 MG/DL (ref 6–23)
BUN/CREAT SERPL: 18 (ref 12–20)
CALCIUM SERPL-MCNC: 9.6 MG/DL (ref 8.5–10.1)
CHLORIDE SERPL-SCNC: 101 MMOL/L (ref 98–107)
CO2 SERPL-SCNC: 24 MMOL/L (ref 21–32)
CREAT SERPL-MCNC: 0.61 MG/DL (ref 0.6–1.3)
GLUCOSE SERPL-MCNC: 116 MG/DL (ref 74–108)
GRAM STN SPEC: NORMAL
POTASSIUM SERPL-SCNC: 4.1 MMOL/L (ref 3.5–5.5)
PROCALCITONIN SERPL-MCNC: 1.54 NG/ML
SERVICE CMNT-IMP: NORMAL
SODIUM SERPL-SCNC: 139 MMOL/L (ref 136–145)

## 2025-05-25 PROCEDURE — 6370000000 HC RX 637 (ALT 250 FOR IP): Performed by: INTERNAL MEDICINE

## 2025-05-25 PROCEDURE — 94640 AIRWAY INHALATION TREATMENT: CPT

## 2025-05-25 PROCEDURE — 80048 BASIC METABOLIC PNL TOTAL CA: CPT

## 2025-05-25 PROCEDURE — 2500000003 HC RX 250 WO HCPCS: Performed by: HOSPITALIST

## 2025-05-25 PROCEDURE — 84145 PROCALCITONIN (PCT): CPT

## 2025-05-25 PROCEDURE — 36415 COLL VENOUS BLD VENIPUNCTURE: CPT

## 2025-05-25 PROCEDURE — 2500000003 HC RX 250 WO HCPCS: Performed by: INTERNAL MEDICINE

## 2025-05-25 PROCEDURE — 6370000000 HC RX 637 (ALT 250 FOR IP): Performed by: HOSPITALIST

## 2025-05-25 PROCEDURE — 6360000002 HC RX W HCPCS: Performed by: HOSPITALIST

## 2025-05-25 RX ORDER — AMLODIPINE BESYLATE 5 MG/1
5 TABLET ORAL DAILY
Qty: 30 TABLET | Refills: 0 | Status: SHIPPED | OUTPATIENT
Start: 2025-05-25 | End: 2025-05-25

## 2025-05-25 RX ORDER — LIDOCAINE 50 MG/G
1 PATCH TOPICAL DAILY
Qty: 10 PATCH | Refills: 0 | Status: SHIPPED | OUTPATIENT
Start: 2025-05-25 | End: 2025-06-04

## 2025-05-25 RX ORDER — DOXYCYCLINE HYCLATE 100 MG
100 TABLET ORAL 2 TIMES DAILY
Qty: 10 TABLET | Refills: 0 | Status: SHIPPED | OUTPATIENT
Start: 2025-05-25 | End: 2025-05-25

## 2025-05-25 RX ORDER — PREDNISONE 5 MG/1
TABLET ORAL
Qty: 1 EACH | Refills: 0 | Status: SHIPPED | OUTPATIENT
Start: 2025-05-25 | End: 2025-05-25

## 2025-05-25 RX ORDER — CEFDINIR 300 MG/1
300 CAPSULE ORAL 2 TIMES DAILY
Qty: 10 CAPSULE | Refills: 0 | Status: SHIPPED | OUTPATIENT
Start: 2025-05-25 | End: 2025-05-30

## 2025-05-25 RX ORDER — PANTOPRAZOLE SODIUM 20 MG/1
20 TABLET, DELAYED RELEASE ORAL DAILY
Qty: 30 TABLET | Refills: 3 | Status: SHIPPED | OUTPATIENT
Start: 2025-05-25 | End: 2025-05-25

## 2025-05-25 RX ORDER — CEFDINIR 300 MG/1
300 CAPSULE ORAL 2 TIMES DAILY
Qty: 10 CAPSULE | Refills: 0 | Status: SHIPPED | OUTPATIENT
Start: 2025-05-25 | End: 2025-05-25

## 2025-05-25 RX ORDER — ALBUTEROL SULFATE 90 UG/1
2 INHALANT RESPIRATORY (INHALATION) 4 TIMES DAILY PRN
Qty: 18 G | Refills: 0 | Status: SHIPPED | OUTPATIENT
Start: 2025-05-25

## 2025-05-25 RX ORDER — ALBUTEROL SULFATE 90 UG/1
2 INHALANT RESPIRATORY (INHALATION) 4 TIMES DAILY PRN
Qty: 18 G | Refills: 0 | Status: SHIPPED | OUTPATIENT
Start: 2025-05-25 | End: 2025-05-25

## 2025-05-25 RX ORDER — AMLODIPINE BESYLATE 5 MG/1
5 TABLET ORAL DAILY
Qty: 30 TABLET | Refills: 0 | Status: SHIPPED | OUTPATIENT
Start: 2025-05-25

## 2025-05-25 RX ORDER — PANTOPRAZOLE SODIUM 20 MG/1
20 TABLET, DELAYED RELEASE ORAL DAILY
Qty: 30 TABLET | Refills: 0 | Status: SHIPPED | OUTPATIENT
Start: 2025-05-25

## 2025-05-25 RX ORDER — PREDNISONE 5 MG/1
TABLET ORAL
Qty: 1 EACH | Refills: 0 | Status: SHIPPED | OUTPATIENT
Start: 2025-05-25

## 2025-05-25 RX ORDER — DOXYCYCLINE HYCLATE 100 MG
100 TABLET ORAL 2 TIMES DAILY
Qty: 10 TABLET | Refills: 0 | Status: SHIPPED | OUTPATIENT
Start: 2025-05-25 | End: 2025-05-30

## 2025-05-25 RX ADMIN — FLUTICASONE PROPIONATE 2 SPRAY: 50 SPRAY, METERED NASAL at 09:46

## 2025-05-25 RX ADMIN — DOXYCYCLINE 100 MG: 100 CAPSULE ORAL at 09:44

## 2025-05-25 RX ADMIN — IPRATROPIUM BROMIDE 0.5 MG: 0.5 SOLUTION RESPIRATORY (INHALATION) at 08:28

## 2025-05-25 RX ADMIN — ACETAMINOPHEN 650 MG: 325 TABLET ORAL at 09:44

## 2025-05-25 RX ADMIN — SERTRALINE HYDROCHLORIDE 50 MG: 50 TABLET ORAL at 09:43

## 2025-05-25 RX ADMIN — APIXABAN 5 MG: 5 TABLET, FILM COATED ORAL at 09:43

## 2025-05-25 RX ADMIN — METHYLPREDNISOLONE SODIUM SUCCINATE 40 MG: 40 INJECTION INTRAMUSCULAR; INTRAVENOUS at 09:45

## 2025-05-25 RX ADMIN — GABAPENTIN 300 MG: 300 CAPSULE ORAL at 09:44

## 2025-05-25 RX ADMIN — BUDESONIDE 250 MCG: 0.25 INHALANT RESPIRATORY (INHALATION) at 08:28

## 2025-05-25 RX ADMIN — AMLODIPINE BESYLATE 5 MG: 5 TABLET ORAL at 09:43

## 2025-05-25 RX ADMIN — SODIUM CHLORIDE, PRESERVATIVE FREE 10 ML: 5 INJECTION INTRAVENOUS at 09:46

## 2025-05-25 ASSESSMENT — PAIN SCALES - GENERAL
PAINLEVEL_OUTOF10: 0
PAINLEVEL_OUTOF10: 0
PAINLEVEL_OUTOF10: 8

## 2025-05-25 ASSESSMENT — PAIN DESCRIPTION - DESCRIPTORS: DESCRIPTORS: ACHING;DISCOMFORT

## 2025-05-25 ASSESSMENT — PAIN DESCRIPTION - ORIENTATION: ORIENTATION: LEFT;RIGHT

## 2025-05-25 ASSESSMENT — PAIN - FUNCTIONAL ASSESSMENT: PAIN_FUNCTIONAL_ASSESSMENT: ACTIVITIES ARE NOT PREVENTED

## 2025-05-25 NOTE — DISCHARGE SUMMARY
Discharge Summary    Patient: Lamont Naranjo               Sex: male          DOA: 5/20/2025         YOB: 1960      Age:  64 y.o.        LOS:  LOS: 4 days                Admit Date: 5/20/2025    Discharge Date: 5/25/2025    Admission Diagnoses: Severe sepsis (HCC) [A41.9, R65.20]  Pneumonia of left lower lobe due to infectious organism [J18.9]  Sepsis, due to unspecified organism, unspecified whether acute organ dysfunction present (HCC) [A41.9]    Discharge Diagnoses:    Hospital Problems           Last Modified POA    * (Principal) Severe sepsis, source likely LLL Pn > UTI 5/21/2025 Yes    Left lower lobe pneumonia 5/21/2025 Yes    Complicated UTI (urinary tract infection) 5/21/2025 Yes    Depression (Chronic) 5/21/2025 Yes    Enlarged prostate (Chronic) 5/21/2025 Yes    Anxiety (Chronic) 5/21/2025 Yes    PTSD (post-traumatic stress disorder) (Chronic) 5/21/2025 Yes    Positive blood culture 5/22/2025 Yes        Discharge Condition: Stable    Hospital Course ;   Lamont Naranjo is a 64 y.o.  male w/ PMH of BPH, PAD s/p b/l BKA and finger amputations, PTSD and PTSD who presents with fatigue and N/V.     Mr. Naranjo, who was BIBA, is a wheelchair bound individual who frequently presents to the ED but rarely admitted.  Pt presents much more fatigue to ED today.  Pt endorses nausea, vomiting, and subjective fever.    Severe sepsis, source likely Pn / LLL infiltrate, uti   Elevated lactic, resolved  Received modified 30cc/kg IVF bolus and early abx,  CXR: Left lower lobe infiltrate.   CT Bilateral lower lobe pulmonary infiltrates   UA: appears positive, send to culture.  UrCx: sent neg  ABX: c/w cefepime and vanc.  Positive bcx due to contamination   Covid 19 flu negative respiratory viral  panel negative   Continue rocpehin and doxy will continue po abx after dc per ID recommended        Home insecure  SW consulted.  CM for insurance concerns.       PAD on eliquis   Finger amputations  BKA,

## 2025-05-25 NOTE — PROGRESS NOTES
[facility]  (A Division of [facility])  Kami Kilpatrick MD   Office Tel:  [phone] Option #8                                                               Date of Admission: 5/20/2025       ID FU for antimicrobial management of pneumonia requested by Dr De La Torre     PCP: No, Pcp    C/C: cough/left shoulder pain    Current Antimicrobials:    Prior Antimicrobials:    Ceftriaxone and doxy 5/22 to date   Vanco 5/21- to date  Cefepime 5/21- to date     Allergy to antibiotics: NA     Assessment:     Bacteremia- CoNS and Strep( MRSE/non pneumococcus strep by [employer])-likely procurement contamination   Multifocal/BL pneumonia   Possible COPD exacerbation  Sepsis/Leucocytosis due to above-declining wbc to normal    Procal 2.36-> 5.56    5/20-blood culture x2- 1/4-- Strep and CoNS( MRSE by [facility])         -CXR-LLL infiltrate         -Viral test /PCR for COVID 19/Influenza A and B -negative.          -resp viral panel-negative  5/21-CT AP: Bilateral lower lobe pulmonary infiltrates. No acute intra-abdominal  abnormality. Prostate enlargement.     5/22- blood culture    History of [place] with septic shock- that led to loss of fingers on both hand and BKA-BL-2017    Past Medical History:   Diagnosis Date    Anxiety     Depression     Enlarged prostate     PTSD (post-traumatic stress disorder)      Recommendation -- ID related:     Cont  Ceftriaxone and doxy for CAP  Possible copd exacerbation- rx per primary  Clincially stable,non hypoxic. If DC today, can cover him for CAP with omnicef and doxy total 7 days   FU urine-legionella/pneumo and resp culture  Supportive care and additional treatment per respective team - tobacco cessaion discussed     Diagnosis and treatment plan reviewed ,questions and concerns discussed with patient.   DW Dr De La Torre- he may be DC to rehab today    Dr Valenzuela is covering over the weekend.  Please call  OR via [employer] for 
    Hospitalist Progress Note    Patient: Lamont Naranjo MRN: [id number]  CSN: [id number]    YOB: 1960  Age: 64 y.o.  Sex: male    DOA: 5/20/2025 LOS:  LOS: 3 days         Total duration of encounter: 4 days      Chief Complaint ;  Lamont Naranjo is a 64 y.o.  male w/ PMH of BPH, PAD s/p b/l BKA and finger amputations, PTSD and PTSD who presents with fatigue and N/V.     Mr. Naranjo, who was BIBA, is a wheelchair bound individual who frequently presents to the ED but rarely admitted.  Pt presents much more fatigue to ED today.  Pt endorses nausea, vomiting, and subjective fever.    Subjective ;  I feel better , coughing with sputum, four ork not open , it is unfair declining me go to rehab-I did not do bad thing.    I used to smokes    Breathing tx and low steroid added       Review of systems:  Constitutional: denies fevers, chills, myalgias  Respiratory: no SOB, +  cough  Cardiovascular: denies chest pain, palpitations  Gastrointestinal: denies nausea, vomiting, diarrhea    10 systems reviewed, all negative other than what is mentioned above.      Vital signs/Intake and Output:  Visit Vitals  BP (!) 145/94   Pulse 69   Temp 97.5 °F (36.4 °C) (Oral)   Resp 18   Ht 1.295 m (4' 3\")   Wt 72.4 kg (159 lb 9.8 oz)   SpO2 99%   BMI 43.15 kg/m²     Current Shift:  05/24 0701 - 05/24 1900  In: 240 [P.O.:240]  Out: 300 [Urine:300]  Last three shifts:  05/22 1901 - 05/24 0700  In: -   Out: 1045 [Urine:1045]    Exam:    General: Well developed, alert, NAD, OX3  Head/Neck: NCAT, supple, No masses, No lymphadenopathy  CVS:Regular rate and rhythm, no M/R/G, S1/S2 heard, no thrill  Lungs:some coarse bs -mild improving   Abdomen: Soft, Nontender, No distention, Normal Bowel sounds, No hepatomegaly, bka bilateral fingers amputated   Extremities: No C/C/E, pulses palpable 2+  Skin:normal texture and turgor, no rashes, no lesions  Neuro:grossly normal , follows commands  Psych:appropriate, anxious     Labs: 
    Hospitalist Progress Note    Patient: Lamont Naranjo MRN: [id number]  CSN: [id number]    YOB: 1960  Age: 64 y.o.  Sex: male    DOA: 5/20/2025 LOS:  LOS: 0 days         Total duration of encounter: 1 day      Chief Complaint ;  Lamont Naranjo is a 64 y.o.  male w/ PMH of BPH, PAD s/p b/l BKA and finger amputations, PTSD and PTSD who presents with fatigue and N/V.     Mr. Naranjo, who was BIBA, is a wheelchair bound individual who frequently presents to the ED but rarely admitted.  Pt presents much more fatigue to ED today.  Pt endorses nausea, vomiting, and subjective fever.    Subjective ;  Not feeling so good and wants to see psych for ptsd, reported breathing getting better     Review of systems:  Constitutional: denies fevers, chills, myalgias  Respiratory: + SOB, +  cough  Cardiovascular: denies chest pain, palpitations  Gastrointestinal: denies nausea, vomiting, diarrhea    10 systems reviewed, all negative other than what is mentioned above.      Vital signs/Intake and Output:  Visit Vitals  /64   Pulse (!) 110   Temp 98.5 °F (36.9 °C) (Oral)   Resp 24   SpO2 95%     Current Shift:  No intake/output data recorded.  Last three shifts:  No intake/output data recorded.    Exam:    General: Well developed, alert, NAD, OX3  Head/Neck: NCAT, supple, No masses, No lymphadenopathy  CVS:Regular rate and rhythm, no M/R/G, S1/S2 heard, no thrill  Lungs:Clear to auscultation bilaterally, no wheezes, rhonchi, or rales  Abdomen: Soft, Nontender, No distention, Normal Bowel sounds, No hepatomegaly, aka rt, bka left and fingers amputated   Extremities: No C/C/E, pulses palpable 2+  Skin:normal texture and turgor, no rashes, no lesions  Neuro:grossly normal , follows commands  Psych:appropriate, anxious     Labs: Results:       Chemistry Recent Labs     05/20/25  2227   GLUCOSE 83      K 4.0   CL 99   CO2 18*   BUN 14   CREATININE 0.66   CALCIUM 9.1   BILITOT 1.1*   AST 37   ALT 28 
    Hospitalist Progress Note    Patient: Lamont Naranjo MRN: [id number]  CSN: [id number]    YOB: 1960  Age: 64 y.o.  Sex: male    DOA: 5/20/2025 LOS:  LOS: 2 days         Total duration of encounter: 3 days      Chief Complaint ;  Lamont Naranjo is a 64 y.o.  male w/ PMH of BPH, PAD s/p b/l BKA and finger amputations, PTSD and PTSD who presents with fatigue and N/V.     Mr. Naranjo, who was BIBA, is a wheelchair bound individual who frequently presents to the ED but rarely admitted.  Pt presents much more fatigue to ED today.  Pt endorses nausea, vomiting, and subjective fever.    Subjective ;  I feel better , still coughing   Sputum cx improving and recommend to continue current abx     Will be dc in 1-2 days after cx back case discussed with dr. Kilpatrick       Review of systems:  Constitutional: denies fevers, chills, myalgias  Respiratory: no SOB, +  cough  Cardiovascular: denies chest pain, palpitations  Gastrointestinal: denies nausea, vomiting, diarrhea    10 systems reviewed, all negative other than what is mentioned above.      Vital signs/Intake and Output:  Visit Vitals  BP (!) 143/90   Pulse 92   Temp 97.7 °F (36.5 °C) (Oral)   Resp 18   Ht 1.295 m (4' 3\")   Wt 72.5 kg (159 lb 13.3 oz)   SpO2 100%   BMI 43.20 kg/m²     Current Shift:  No intake/output data recorded.  Last three shifts:  05/21 1901 - 05/23 0700  In: 240 [P.O.:240]  Out: 2045 [Urine:2045]    Exam:    General: Well developed, alert, NAD, OX3  Head/Neck: NCAT, supple, No masses, No lymphadenopathy  CVS:Regular rate and rhythm, no M/R/G, S1/S2 heard, no thrill  Lungs:some coarse bs   Abdomen: Soft, Nontender, No distention, Normal Bowel sounds, No hepatomegaly, aka rt, bka left and fingers amputated   Extremities: No C/C/E, pulses palpable 2+  Skin:normal texture and turgor, no rashes, no lesions  Neuro:grossly normal , follows commands  Psych:appropriate, anxious     Labs: Results:       Chemistry Recent Labs     
  Physician Progress Note      PATIENT:               EDIE HUITRON  CSN #:                  [id number]  :                       1960  ADMIT DATE:       2025 8:41 PM  DISCH DATE:  RESPONDING  PROVIDER #:        Donya De La Torre MD          QUERY TEXT:    Pneumonia is documented in the medical record You Velasco, DO, H&P   2025. Please specify the type of pneumonia and the causative organism:    The clinical indicators include:  > XR CHEST PORTABLE 2025- \"IMPRESSION: Left lower lobe infiltrate.\"  > H&P 2025- \"Severe sepsis, source likely LLL PN\"  Options provided:  -- Gram negative pneumonia  -- Gram positive pneumonia  -- MRSA pneumonia  -- MSSA pneumonia  -- Viral pneumonia  -- Aspiration pneumonia  -- Hypostatic pneumonia  -- Other - I will add my own diagnosis  -- Disagree - Not applicable / Not valid  -- Disagree - Clinically unable to determine / Unknown  -- Refer to Clinical Documentation Reviewer    PROVIDER RESPONSE TEXT:    This patient has gram negative pneumonia.    Query created by: Sameera Mart on 2025 4:13 PM      Electronically signed by:  Donya De La Torre MD 2025 6:56 PM          
All meds resent  cvs at [facility]   
DC Plan: per previous Care Manager note: [facility]    Care manager noted patient doing wheelies in hallway outside of room; per nursing staff patient has been educated that this is not a safe practice.  Patient's speech rambling regarding multiple reasons as to why he does not have housing; per patient he was banned from the [facility] program, incarcerated, stated he does not understand why SNFs have declined him due to incarceration \"which was a long time ago.\"  Care manager verified that [facility] is open 7 days a week including Memorial Day holiday from 7a-5p.  
Due to patients criminal history, patient unable to DC to [facility]. Patient no longer has Medicare due to being incarcerated at the enrollment period. Patient only has Medicaid for LTC. Patient agreeable to [facility] and will be able DC to shelter now that he has an ID. Patient does not require PT/OT as he is able to transfer independently to and from wheelchair. Patient asking for paperwork to be faxed to [facility] [employer] at [phone] for court purposes. Plan to DC to [facility] when stable. [facility] available 7 days a week 7am-5pm with assist to shelter programs in evenings.     [facility]    Address: [address] [place] [address]  Phone: [phone]  Hours: Open ? Closes 5?PM   
Patient comes out of room despite redirection and shouting.  
Patient observed in wheelchair in hallway \"popping wheelies\". Safety education reviewed with patient. Patient verbalizes understanding and returned to room.     2054 Patient called nurses station on call goodwin and shouted \"Trump\". Educated patient on appropriate call bell use. Patient verbalized understanding.   
Patient to DC to [facility] 05/24. Patient does have a manual wheelchair that will need to travel with patient. PT/OT to see patient today regarding possible skilled need. Patient working on housing in community with [facility] as well. Patient did obtain ID 05/22 . Plan to DC to [facility] 05/24 with medical wheelchair transport.     [facility]       Address: [address] [place] [address]  Phone: [phone]      DC summary to follow.   
Pharmacy Note:  Vancomycin    Vancomycin Random Level 57.5 mg/l at 05:12 5/21/2025.  Unsure if Vancomycin was infusing when drawn.  Follow up Level scheduled with am labs 5/22/2025  Patient remains on Vancomycin 1250 mg IV q12hrs  
Pt educated on discharge summary, iv removed without complications, pt has all belongings  
[facility]   Pharmacy Pharmacokinetic Monitoring Service - Vancomycin     Lamont Naranjo is a 64 y.o. male starting on vancomycin therapy for Sepsis of Unknown Etiology / Pneumonia (CAP) x 7 days / Other x 5 days. Pharmacy consulted by Dr. You Velasco for monitoring and adjustment.    Target Concentration: Goal AUC/MICHELE 400-600 mg*hr/L    Additional Antimicrobials: Cefepime / Zosyn x once    Pertinent Laboratory Values:   Wt Readings from Last 1 Encounters:   05/20/25 72.6 kg (160 lb)     Temp Readings from Last 1 Encounters:   05/21/25 98.4 °F (36.9 °C) (Oral)     Estimated Creatinine Clearance: 82 mL/min (based on SCr of 0.66 mg/dL).  Recent Labs     05/20/25  2227   CREATININE 0.66   BUN 14   WBC 19.6*     Procalcitonin: 2.36 ng/mL (as of 5/21/25 @ 0002)    Pertinent Cultures:  Culture Date Source Results   5/21/25  Blood Culture x 2  (Pending)   MRSA Nasal Swab: was ordered by provider, awaiting results.    Plan:  Dosing recommendations based on Bayesian software  Started Vancomycin 1750 mg IV once in the ED  Maintenance Dose: Vancomycin 1250 mg IV q12h starting on 5/21/25 @ 1200  Estimated AUC(ss): 576 mg/L.hr  Estimated T(ss): 17.4 mg/L  Renal labs as indicated   Vancomycin concentration ordered for 5/22/25 @ 0600 with morning labs   Pharmacy will continue to monitor patient and adjust therapy as indicated    Thank you for the consult,  Ese Saleh RPH  5/21/2025 2:56 AM    
[facility]   Pharmacy Pharmacokinetic Monitoring Service - Vancomycin    Consulting Provider: Dr. Velasco   Indication: CAP - 7 day tx, Sepsis of Unknown Etiology - 5 day tx  Target Concentration: Goal AUC/MICHELE 400-600 mg*hr/L  Day of Therapy: 2  Additional Antimicrobials: Cefepime    Pertinent Laboratory Values:   Wt Readings from Last 1 Encounters:   05/21/25 72.5 kg (159 lb 13.3 oz)     Temp Readings from Last 1 Encounters:   05/22/25 98.1 °F (36.7 °C) (Oral)     Estimated Creatinine Clearance: 89 mL/min (based on SCr of 0.61 mg/dL).  Recent Labs     05/20/25  2227 05/22/25  0452   CREATININE 0.66 0.61   BUN 14 14   WBC 19.6* 17.0*     Recent vancomycin administrations                     vancomycin (VANCOCIN) 1250 mg in sodium chloride 0.9% 250 mL IVPB (mg) 1,250 mg New Bag 05/22/25 0338     1,250 mg New Bag 05/21/25 1528    vancomycin (VANCOCIN) 1750 mg in sodium chloride 0.9 % 500 mL IVPB (mg) 1,750 mg New Bag 05/21/25 0040             Plan:  Vancomycin Random Level 29.1 mg/l at 04:52 5/22/2025  Updated  mg/l.hr  Trough 9 mg/l  Will increase Vancomycin to 1500 mg IV q12hrs  Estimate  mg/l.hr  Trough 11 mg/l  Ordered a Vancomycin Random Level for 09:00 5/23/2025  Pharmacy will continue to monitor patient and adjust therapy as indicated    ANGELA MEYER RPH, BCPS  5/22/2025 8:23 AM   [phone]  
TRANSFER - IN REPORT:    Verbal report received from Kike Zarco RN on Lamont Naranjo  being received from ED for routine progression of patient care      Report consisted of patient's Situation, Background, Assessment and   Recommendations(SBAR).     Information from the following report(s) Nurse Handoff Report, MAR, and Cardiac Rhythm none patient refused monitor was reviewed with the receiving nurse.    Opportunity for questions and clarification was provided.      Assessment completed upon patient's arrival to unit and care assumed. '  
anxious     Labs: Results:       Chemistry Recent Labs     05/20/25 2227 05/22/25  0452   GLUCOSE 83 86    139   K 4.0 3.8   CL 99 106   CO2 18* 23   BUN 14 14   CREATININE 0.66 0.61   CALCIUM 9.1 8.2*   BILITOT 1.1* 0.6   AST 37 22   ALT 28 19   ALKPHOS 156* 102   GLOB 3.5 3.1   LABGLOM >90 >90      CBC w/Diff Recent Labs     05/20/25 2227 05/22/25  0452   WBC 19.6* 17.0*   RBC 4.57 3.76*   HGB 13.7 11.2*   HCT 40.9 35.2*    225   LYMPHOPCT 6*  --       Cardiac Enzymes No results for input(s): \"CKTOTAL\", \"CKMB\", \"CKMBINDEX\", \"TROPONINI\" in the last 72 hours.    Invalid input(s): \"JARETH\"   Coagulation No results for input(s): \"PROTIME\", \"INR\", \"APTT\" in the last 72 hours.    Lipid Panel No results found for: \"CHOL\", \"TRIG\", \"HDL\", \"VLDL\", \"CHOLHDLRATIO\"   BNP No results for input(s): \"BNP\" in the last 72 hours.   Liver Enzymes Recent Labs     05/22/25  0452   ALT 19   AST 22   ALKPHOS 102   BILITOT 0.6      Thyroid Studies No results found for: \"R2HYJUU\", \"FT3\", \"T4FREE\", \"TSH\"       Procedures/imaging: see electronic medical records for all procedures/Xrays and details which were not copied into this note but were reviewed prior to creation of Plan         Assessment/Plan     Principal Problem:    Severe sepsis, source likely LLL Pn > UTI  Active Problems:    Left lower lobe pneumonia    Complicated UTI (urinary tract infection)    Depression    Enlarged prostate    Anxiety    PTSD (post-traumatic stress disorder)  Resolved Problems:    * No resolved hospital problems. *     Severe sepsis, source likely Pn / LLL infiltrate, uti   Elevated lactic, resolved  Received modified 30cc/kg IVF bolus and early abx,  CXR: Left lower lobe infiltrate.   CT Bilateral lower lobe pulmonary infiltrates   UA: appears positive, send to culture.  UrCx: sent.  ABX: c/w cefepime and vanc.  Positive bcx will have id on board   Covid 19 flu negative respiratory viral  panel negative      Home insecure  SW consulted.  CM for

## 2025-05-25 NOTE — CARE COORDINATION
1046 Transport has been set up with the patients Medicaid at [phone]. Trip # is [id number] and the will be by to  the patient as soon as they can.    1116 Patient states he would like to me cancel his transport and will provide hid own transport. Transport cancelled.

## 2025-05-26 LAB
BACTERIA SPEC CULT: NORMAL
SERVICE CMNT-IMP: NORMAL

## 2025-05-28 LAB
BACTERIA SPEC CULT: NORMAL
BACTERIA SPEC CULT: NORMAL
SERVICE CMNT-IMP: NORMAL
SERVICE CMNT-IMP: NORMAL

## 2025-05-29 ENCOUNTER — HOSPITAL ENCOUNTER (EMERGENCY)
Facility: HOSPITAL | Age: 65
Discharge: HOME | End: 2025-05-29
Attending: EMERGENCY MEDICINE
Payer: MEDICARE

## 2025-05-29 ENCOUNTER — APPOINTMENT (OUTPATIENT)
Facility: HOSPITAL | Age: 65
End: 2025-05-29
Payer: MEDICARE

## 2025-05-29 VITALS
TEMPERATURE: 99.1 F | RESPIRATION RATE: 18 BRPM | SYSTOLIC BLOOD PRESSURE: 137 MMHG | OXYGEN SATURATION: 97 % | HEART RATE: 96 BPM | DIASTOLIC BLOOD PRESSURE: 83 MMHG

## 2025-05-29 DIAGNOSIS — S40.012A CONTUSION OF LEFT SHOULDER, INITIAL ENCOUNTER: ICD-10-CM
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
## 2025-05-29 PROCEDURE — 99283 EMERGENCY DEPT VISIT LOW MDM: CPT

## 2025-05-29 PROCEDURE — 73030 X-RAY EXAM OF SHOULDER: CPT

## 2025-05-30 NOTE — DISCHARGE INSTRUCTIONS
Thank you for choosing [facility]'s Emergency Department for your care. It is our privilege to provide excellent care for you in your time of need. In the next several days, you may receive a survey via mail or email about your experience with our team. We would appreciate you taking a few minutes to complete the survey, as we use this information to learn what we have done well and what we could be doing better. Thank you for trusting us with your care.    -----------------------------------------------------------------------------  Below you will find a list of your tests from today's visit.   Labs  No results found for this or any previous visit (from the past 12 hours).     Radiologic Studies  XR SHOULDER LEFT (MIN 2 VIEWS)   Final Result   No acute bony abnormalities.         Electronically signed by DOROTA Ivory        -----------------------------------------------------------------------------  The evaluation and treatment you received in the Emergency Department were for an urgent problem. It is important that you follow-up with a doctor, nurse practitioner, or physician assistant to: 1. confirm your diagnosis, 2. re-evaluate changes in your illness and treatment, and 3. for ongoing care. In some cases, specialist follow up is recommended. You will need to call to arrange an appointment. Recommended providers are listed in this packet. Please take your discharge instructions with you when you go to your follow-up appointment.      If your symptoms become worse or you do not improve as expected, please return to us. We are available 24 hours a day.      If a prescription has been provided, please fill it as soon as possible to prevent a delay in treatment. If you have any questions or reservations about taking the medication due to side effects or interactions with other medications, please call your primary care provider or contact us directly.  Again, THANK YOU for choosing us to care for

## 2025-05-30 NOTE — ED PROVIDER NOTES
[facility] EMERGENCY DEPT  EMERGENCY DEPARTMENT ENCOUNTER    Patient Name: Lamont Naranjo  MRN: [id number]  YOB: 1960  Provider: Ervin Gavin MD  PCP: Tanisha Pcp   Time/Date of evaluation: 9:42 PM EDT on 5/29/25    History of Presenting Illness     Chief Complaint   Patient presents with    Fall    Shoulder Injury       History Provided by: Patient   History is limited by: Nothing    HISTORY (Narative):   Lamont Naranjo is a 64 y.o. male with a PMHX of bilateral BKA, multiple finger amputations  who presents to the emergency department (room Q3) by POV C/O fall onset today when doing a wheelie in his wheelchair. Associated sxs include left shoulder pain. Patient denies any other sxs or complaints.     Nursing Notes were all reviewed and agreed with or any disagreements were addressed in the HPI.    Past History     PAST MEDICAL HISTORY:  Past Medical History:   Diagnosis Date    Anxiety     Depression     Enlarged prostate     PTSD (post-traumatic stress disorder)        PAST SURGICAL HISTORY:  Past Surgical History:   Procedure Laterality Date    ORTHOPEDIC SURGERY      double amputee       FAMILY HISTORY:  No family history on file.    SOCIAL HISTORY:  Social History     Tobacco Use    Smoking status: Every Day   Substance Use Topics    Alcohol use: Yes    Drug use: Yes     Types: Cocaine, Marijuana (Weed)       MEDICATIONS:  No current facility-administered medications for this encounter.     Current Outpatient Medications   Medication Sig Dispense Refill    sertraline (ZOLOFT) 50 MG tablet Take 1 tablet by mouth daily 30 tablet 0    lidocaine (LIDODERM) 5 % Place 1 patch onto the skin daily for 10 days 12 hours on, 12 hours off. 10 patch 0    albuterol sulfate HFA (VENTOLIN HFA) 108 (90 Base) MCG/ACT inhaler Inhale 2 puffs into the lungs 4 times daily as needed for Wheezing 18 g 0    amLODIPine (NORVASC) 5 MG tablet Take 1 tablet by mouth daily 30 tablet 0    cefdinir (OMNICEF) 300 MG capsule

## 2025-06-17 ENCOUNTER — APPOINTMENT (OUTPATIENT)
Facility: HOSPITAL | Age: 65
End: 2025-06-17
Payer: MEDICARE

## 2025-06-17 ENCOUNTER — HOSPITAL ENCOUNTER (EMERGENCY)
Facility: HOSPITAL | Age: 65
Discharge: HOME | End: 2025-06-18
Attending: EMERGENCY MEDICINE
Payer: MEDICARE

## 2025-06-17 DIAGNOSIS — F14.10 NONDEPENDENT COCAINE ABUSE (HCC): ICD-10-CM

## 2025-06-17 DIAGNOSIS — S09.90XA CLOSED HEAD INJURY, INITIAL ENCOUNTER: ICD-10-CM

## 2025-06-17 DIAGNOSIS — F10.929 ACUTE ALCOHOLIC INTOXICATION WITH COMPLICATION: ICD-10-CM
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
## 2025-06-17 LAB
ALBUMIN SERPL-MCNC: 3.6 G/DL (ref 3.4–5)
ALBUMIN/GLOB SERPL: 1.1 (ref 0.8–1.7)
ALP SERPL-CCNC: 118 U/L (ref 45–117)
ALT SERPL-CCNC: 22 U/L (ref 10–50)
AMPHET UR QL SCN: NEGATIVE
ANION GAP SERPL CALC-SCNC: 16 MMOL/L (ref 3–18)
APPEARANCE UR: CLEAR
AST SERPL-CCNC: 31 U/L (ref 10–38)
BARBITURATES UR QL SCN: NEGATIVE
BASOPHILS # BLD: 0.07 K/UL (ref 0–0.1)
BASOPHILS NFR BLD: 1.2 % (ref 0–2)
BENZODIAZ UR QL: NEGATIVE
BILIRUB SERPL-MCNC: 0.6 MG/DL (ref 0.2–1)
BILIRUB UR QL: NEGATIVE
BUN SERPL-MCNC: 10 MG/DL (ref 6–23)
BUN/CREAT SERPL: 13 (ref 12–20)
CALCIUM SERPL-MCNC: 9 MG/DL (ref 8.5–10.1)
CANNABINOIDS UR QL SCN: POSITIVE
CHLORIDE SERPL-SCNC: 107 MMOL/L (ref 98–107)
CHP ED QC CHECK: NORMAL
CO2 SERPL-SCNC: 21 MMOL/L (ref 21–32)
COCAINE UR QL SCN: POSITIVE
COLOR UR: YELLOW
CREAT SERPL-MCNC: 0.74 MG/DL (ref 0.6–1.3)
DIFFERENTIAL METHOD BLD: ABNORMAL
EOSINOPHIL # BLD: 0.54 K/UL (ref 0–0.4)
EOSINOPHIL NFR BLD: 9.6 % (ref 0–5)
ERYTHROCYTE [DISTWIDTH] IN BLOOD BY AUTOMATED COUNT: 16.9 % (ref 11.6–14.5)
ETHANOL SERPL-MCNC: 227 MG/DL (ref 0–0.08)
FENTANYL: NEGATIVE
GLOBULIN SER CALC-MCNC: 3.4 G/DL (ref 2–4)
GLUCOSE BLD STRIP.AUTO-MCNC: 94 MG/DL (ref 70–110)
GLUCOSE BLD-MCNC: 96 MG/DL
GLUCOSE SERPL-MCNC: 96 MG/DL (ref 74–108)
GLUCOSE UR STRIP.AUTO-MCNC: NEGATIVE MG/DL
HCT VFR BLD AUTO: 39.2 % (ref 36–48)
HGB BLD-MCNC: 12.7 G/DL (ref 13–16)
HGB UR QL STRIP: NEGATIVE
IMM GRANULOCYTES # BLD AUTO: 0.01 K/UL (ref 0–0.04)
IMM GRANULOCYTES NFR BLD AUTO: 0.2 % (ref 0–0.5)
KETONES UR QL STRIP.AUTO: NEGATIVE MG/DL
LEUKOCYTE ESTERASE UR QL STRIP.AUTO: NEGATIVE
LYMPHOCYTES # BLD: 1.96 K/UL (ref 0.9–3.3)
LYMPHOCYTES NFR BLD: 34.9 % (ref 21–52)
Lab: ABNORMAL
MAGNESIUM SERPL-MCNC: 2.3 MG/DL (ref 1.6–2.6)
MCH RBC QN AUTO: 30.1 PG (ref 24–34)
MCHC RBC AUTO-ENTMCNC: 32.4 G/DL (ref 31–37)
MCV RBC AUTO: 92.9 FL (ref 78–100)
METHADONE UR QL: NEGATIVE
MONOCYTES # BLD: 0.46 K/UL (ref 0.05–1.2)
MONOCYTES NFR BLD: 8.2 % (ref 3–10)
NEUTS SEG # BLD: 2.57 K/UL (ref 1.8–8)
NEUTS SEG NFR BLD: 45.9 % (ref 40–73)
NITRITE UR QL STRIP.AUTO: NEGATIVE
NRBC # BLD: 0 K/UL (ref 0–0.01)
NRBC BLD-RTO: 0 PER 100 WBC
OPIATES UR QL: NEGATIVE
OXYCODONE UR QL SCN: NEGATIVE
PH UR STRIP: 5 (ref 5–8)
PLATELET # BLD AUTO: 353 K/UL (ref 135–420)
PMV BLD AUTO: 10 FL (ref 9.2–11.8)
POTASSIUM SERPL-SCNC: 4.1 MMOL/L (ref 3.5–5.5)
PROT SERPL-MCNC: 6.9 G/DL (ref 6.4–8.2)
PROT UR STRIP-MCNC: NEGATIVE MG/DL
RBC # BLD AUTO: 4.22 M/UL (ref 4.35–5.65)
SODIUM SERPL-SCNC: 144 MMOL/L (ref 136–145)
SP GR UR REFRACTOMETRY: 1.02 (ref 1–1.03)
UROBILINOGEN UR QL STRIP.AUTO: 0.2 EU/DL (ref 0.2–1)
WBC # BLD AUTO: 5.6 K/UL (ref 4.6–13.2)

## 2025-06-17 PROCEDURE — 82077 ASSAY SPEC XCP UR&BREATH IA: CPT

## 2025-06-17 PROCEDURE — 80053 COMPREHEN METABOLIC PANEL: CPT

## 2025-06-17 PROCEDURE — 93005 ELECTROCARDIOGRAM TRACING: CPT | Performed by: EMERGENCY MEDICINE

## 2025-06-17 PROCEDURE — 82962 GLUCOSE BLOOD TEST: CPT

## 2025-06-17 PROCEDURE — 99285 EMERGENCY DEPT VISIT HI MDM: CPT

## 2025-06-17 PROCEDURE — 71045 X-RAY EXAM CHEST 1 VIEW: CPT

## 2025-06-17 PROCEDURE — 83735 ASSAY OF MAGNESIUM: CPT

## 2025-06-17 PROCEDURE — 73030 X-RAY EXAM OF SHOULDER: CPT

## 2025-06-17 PROCEDURE — 70450 CT HEAD/BRAIN W/O DYE: CPT

## 2025-06-17 PROCEDURE — 80307 DRUG TEST PRSMV CHEM ANLYZR: CPT

## 2025-06-17 PROCEDURE — 85025 COMPLETE CBC W/AUTO DIFF WBC: CPT

## 2025-06-17 PROCEDURE — 2580000003 HC RX 258: Performed by: EMERGENCY MEDICINE

## 2025-06-17 PROCEDURE — 81003 URINALYSIS AUTO W/O SCOPE: CPT

## 2025-06-17 PROCEDURE — 94762 N-INVAS EAR/PLS OXIMTRY CONT: CPT

## 2025-06-17 RX ORDER — 0.9 % SODIUM CHLORIDE 0.9 %
1000 INTRAVENOUS SOLUTION INTRAVENOUS ONCE
Status: COMPLETED | OUTPATIENT
Start: 2025-06-17 | End: 2025-06-17

## 2025-06-17 RX ADMIN — SODIUM CHLORIDE 1000 ML: 0.9 INJECTION, SOLUTION INTRAVENOUS at 19:32

## 2025-06-17 NOTE — ED PROVIDER NOTES
1.80 - 8.00 K/UL    Lymphocytes Absolute 1.96 0.90 - 3.30 K/UL    Monocytes Absolute 0.46 0.05 - 1.20 K/UL    Eosinophils Absolute 0.54 (H) 0.00 - 0.40 K/UL    Basophils Absolute 0.07 0.00 - 0.10 K/UL    Immature Granulocytes Absolute 0.01 0.00 - 0.04 K/UL    Differential Type AUTOMATED     Comprehensive Metabolic Panel    Collection Time: 06/17/25  5:32 PM   Result Value Ref Range    Sodium 144 136 - 145 mmol/L    Potassium 4.1 3.5 - 5.5 mmol/L    Chloride 107 98 - 107 mmol/L    CO2 21 21 - 32 mmol/L    Anion Gap 16 3 - 18 mmol/L    Glucose 96 74 - 108 mg/dL    BUN 10 6 - 23 MG/DL    Creatinine 0.74 0.60 - 1.30 MG/DL    BUN/Creatinine Ratio 13 12 - 20      Est, Glom Filt Rate >90 >60 ml/min/1.73m2    Calcium 9.0 8.5 - 10.1 MG/DL    Total Bilirubin 0.6 0.2 - 1.0 MG/DL    ALT 22 10 - 50 U/L    AST 31 10 - 38 U/L    Alk Phosphatase 118 (H) 45 - 117 U/L    Total Protein 6.9 6.4 - 8.2 g/dL    Albumin 3.6 3.4 - 5.0 g/dL    Globulin 3.4 2.0 - 4.0 g/dL    Albumin/Globulin Ratio 1.1 0.8 - 1.7     Magnesium    Collection Time: 06/17/25  5:32 PM   Result Value Ref Range    Magnesium 2.3 1.6 - 2.6 mg/dL   Ethanol    Collection Time: 06/17/25  5:32 PM   Result Value Ref Range    Ethanol Lvl 227 MG/DL   POCT Glucose    Collection Time: 06/17/25  5:40 PM   Result Value Ref Range    POC Glucose 94 70 - 110 mg/dL   POCT Glucose    Collection Time: 06/17/25  7:23 PM   Result Value Ref Range    Glucose 96 mg/dL    QC OK? Ok    Urine Drug Screen    Collection Time: 06/17/25  9:00 PM   Result Value Ref Range    Amphetamine, Urine Negative NEG      Barbiturates, Urine Negative NEG      Benzodiazepines, Urine Negative NEG      Cocaine, Urine Positive (A) NEG      Fentanyl Negative NEG      Methadone, Urine Negative NEG      Opiates, Urine Negative NEG      Oxycodone Urine Negative NEG      THC, TH-Cannabinol, Urine Positive (A) NEG      Drug Screen Comment:        Specimen analysis was performed without chain of custody handling. These

## 2025-06-17 NOTE — ED TRIAGE NOTES
Patient brought in by [facility].  Patient was doing a wheelie in his wheelchair and fell. Bystanders called medics to scene.

## 2025-06-18 VITALS
RESPIRATION RATE: 17 BRPM | HEART RATE: 88 BPM | SYSTOLIC BLOOD PRESSURE: 121 MMHG | DIASTOLIC BLOOD PRESSURE: 69 MMHG | OXYGEN SATURATION: 100 %

## 2025-06-18 NOTE — DISCHARGE INSTRUCTIONS
Thank you for choosing [facility]'s Emergency Department for your care. It is our privilege to provide excellent care for you in your time of need. In the next several days, you may receive a survey via mail or email about your experience with our team. We would appreciate you taking a few minutes to complete the survey, as we use this information to learn what we have done well and what we could be doing better. Thank you for trusting us with your care.    -----------------------------------------------------------------------------  Below you will find a list of your tests from today's visit.   Labs  Recent Results (from the past 12 hours)   CBC with Auto Differential    Collection Time: 06/17/25  5:32 PM   Result Value Ref Range    WBC 5.6 4.6 - 13.2 K/uL    RBC 4.22 (L) 4.35 - 5.65 M/uL    Hemoglobin 12.7 (L) 13.0 - 16.0 g/dL    Hematocrit 39.2 36.0 - 48.0 %    MCV 92.9 78.0 - 100.0 FL    MCH 30.1 24.0 - 34.0 PG    MCHC 32.4 31.0 - 37.0 g/dL    RDW 16.9 (H) 11.6 - 14.5 %    Platelets 353 135 - 420 K/uL    MPV 10.0 9.2 - 11.8 FL    Nucleated RBCs 0.0 0  WBC    nRBC 0.00 0.00 - 0.01 K/uL    Neutrophils % 45.9 40.0 - 73.0 %    Lymphocytes % 34.9 21.0 - 52.0 %    Monocytes % 8.2 3.0 - 10.0 %    Eosinophils % 9.6 (H) 0.0 - 5.0 %    Basophils % 1.2 0.0 - 2.0 %    Immature Granulocytes % 0.2 0.0 - 0.5 %    Neutrophils Absolute 2.57 1.80 - 8.00 K/UL    Lymphocytes Absolute 1.96 0.90 - 3.30 K/UL    Monocytes Absolute 0.46 0.05 - 1.20 K/UL    Eosinophils Absolute 0.54 (H) 0.00 - 0.40 K/UL    Basophils Absolute 0.07 0.00 - 0.10 K/UL    Immature Granulocytes Absolute 0.01 0.00 - 0.04 K/UL    Differential Type AUTOMATED     Comprehensive Metabolic Panel    Collection Time: 06/17/25  5:32 PM   Result Value Ref Range    Sodium 144 136 - 145 mmol/L    Potassium 4.1 3.5 - 5.5 mmol/L    Chloride 107 98 - 107 mmol/L    CO2 21 21 - 32 mmol/L    Anion Gap 16 3 - 18 mmol/L    Glucose 96 74 - 108 mg/dL    BUN

## 2025-06-20 LAB
EKG ATRIAL RATE: 100 BPM
EKG DIAGNOSIS: NORMAL
EKG P AXIS: -6 DEGREES
EKG P-R INTERVAL: 176 MS
EKG Q-T INTERVAL: 328 MS
EKG QRS DURATION: 70 MS
EKG QTC CALCULATION (BAZETT): 423 MS
EKG R AXIS: 32 DEGREES
EKG T AXIS: 29 DEGREES
EKG VENTRICULAR RATE: 100 BPM

## 2025-06-20 PROCEDURE — 93010 ELECTROCARDIOGRAM REPORT: CPT | Performed by: INTERNAL MEDICINE

## 2025-08-04 ENCOUNTER — APPOINTMENT (OUTPATIENT)
Facility: HOSPITAL | Age: 65
End: 2025-08-04
Payer: MEDICARE

## 2025-08-04 ENCOUNTER — HOSPITAL ENCOUNTER (EMERGENCY)
Facility: HOSPITAL | Age: 65
Discharge: LEFT BEFORE DISCHARGE | End: 2025-08-04
Attending: STUDENT IN AN ORGANIZED HEALTH CARE EDUCATION/TRAINING PROGRAM
Payer: MEDICARE

## 2025-08-04 VITALS
HEART RATE: 87 BPM | RESPIRATION RATE: 20 BRPM | DIASTOLIC BLOOD PRESSURE: 93 MMHG | OXYGEN SATURATION: 97 % | SYSTOLIC BLOOD PRESSURE: 135 MMHG | TEMPERATURE: 98 F

## 2025-08-04 DIAGNOSIS — M79.641 PAIN OF RIGHT HAND: Primary | ICD-10-CM

## 2025-08-04 DIAGNOSIS — R19.7 DIARRHEA, UNSPECIFIED TYPE: ICD-10-CM

## 2025-08-04 PROCEDURE — 99283 EMERGENCY DEPT VISIT LOW MDM: CPT

## 2025-08-04 PROCEDURE — 73130 X-RAY EXAM OF HAND: CPT

## 2025-08-04 ASSESSMENT — LIFESTYLE VARIABLES
HOW MANY STANDARD DRINKS CONTAINING ALCOHOL DO YOU HAVE ON A TYPICAL DAY: 3 OR 4
HOW OFTEN DO YOU HAVE A DRINK CONTAINING ALCOHOL: 2-4 TIMES A MONTH

## 2025-08-04 ASSESSMENT — ENCOUNTER SYMPTOMS
VOMITING: 0
SHORTNESS OF BREATH: 0
DIARRHEA: 0

## 2025-08-04 ASSESSMENT — PAIN - FUNCTIONAL ASSESSMENT: PAIN_FUNCTIONAL_ASSESSMENT: 0-10

## 2025-08-06 ENCOUNTER — APPOINTMENT (OUTPATIENT)
Facility: HOSPITAL | Age: 65
End: 2025-08-06
Payer: MEDICARE

## 2025-08-06 ENCOUNTER — HOSPITAL ENCOUNTER (EMERGENCY)
Facility: HOSPITAL | Age: 65
Discharge: HOME | End: 2025-08-06
Attending: EMERGENCY MEDICINE
Payer: MEDICARE

## 2025-08-06 VITALS
SYSTOLIC BLOOD PRESSURE: 139 MMHG | HEART RATE: 93 BPM | OXYGEN SATURATION: 97 % | TEMPERATURE: 97.7 F | RESPIRATION RATE: 20 BRPM | DIASTOLIC BLOOD PRESSURE: 87 MMHG

## 2025-08-06 DIAGNOSIS — R19.7 DIARRHEA, UNSPECIFIED TYPE: Primary | ICD-10-CM

## 2025-08-06 DIAGNOSIS — M25.512 LEFT SHOULDER PAIN, UNSPECIFIED CHRONICITY: ICD-10-CM

## 2025-08-06 DIAGNOSIS — S60.229A: ICD-10-CM

## 2025-08-06 PROCEDURE — 73130 X-RAY EXAM OF HAND: CPT

## 2025-08-06 PROCEDURE — 73030 X-RAY EXAM OF SHOULDER: CPT

## 2025-08-06 PROCEDURE — 99283 EMERGENCY DEPT VISIT LOW MDM: CPT

## 2025-08-06 RX ORDER — ACETAMINOPHEN 325 MG/1
650 TABLET ORAL
Status: DISCONTINUED | OUTPATIENT
Start: 2025-08-06 | End: 2025-08-06 | Stop reason: HOSPADM

## 2025-08-08 ENCOUNTER — HOSPITAL ENCOUNTER (EMERGENCY)
Facility: HOSPITAL | Age: 65
Discharge: HOME | End: 2025-08-08
Attending: STUDENT IN AN ORGANIZED HEALTH CARE EDUCATION/TRAINING PROGRAM
Payer: MEDICARE

## 2025-08-08 VITALS
OXYGEN SATURATION: 98 % | SYSTOLIC BLOOD PRESSURE: 155 MMHG | DIASTOLIC BLOOD PRESSURE: 96 MMHG | HEART RATE: 68 BPM | TEMPERATURE: 98 F | RESPIRATION RATE: 18 BRPM

## 2025-08-08 DIAGNOSIS — R52 GENERALIZED PAIN: Primary | ICD-10-CM

## 2025-08-08 DIAGNOSIS — M79.641 RIGHT HAND PAIN: ICD-10-CM

## 2025-08-08 PROCEDURE — 6370000000 HC RX 637 (ALT 250 FOR IP): Performed by: STUDENT IN AN ORGANIZED HEALTH CARE EDUCATION/TRAINING PROGRAM

## 2025-08-08 PROCEDURE — 99283 EMERGENCY DEPT VISIT LOW MDM: CPT

## 2025-08-08 RX ORDER — FLUTICASONE PROPIONATE 50 MCG
2 SPRAY, SUSPENSION (ML) NASAL DAILY
Qty: 16 G | Refills: 0 | Status: SHIPPED | OUTPATIENT
Start: 2025-08-08

## 2025-08-08 RX ORDER — KETOROLAC TROMETHAMINE 15 MG/ML
15 INJECTION, SOLUTION INTRAMUSCULAR; INTRAVENOUS
Status: DISCONTINUED | OUTPATIENT
Start: 2025-08-08 | End: 2025-08-08 | Stop reason: HOSPADM

## 2025-08-08 RX ORDER — LIDOCAINE 50 MG/G
1 PATCH TOPICAL DAILY
Qty: 10 PATCH | Refills: 0 | Status: SHIPPED | OUTPATIENT
Start: 2025-08-08 | End: 2025-08-18

## 2025-08-08 RX ORDER — LIDOCAINE 4 G/G
1 PATCH TOPICAL
Status: DISCONTINUED | OUTPATIENT
Start: 2025-08-08 | End: 2025-08-08 | Stop reason: HOSPADM

## 2025-08-08 RX ORDER — NAPROXEN 500 MG/1
500 TABLET ORAL 2 TIMES DAILY
Qty: 60 TABLET | Refills: 0 | Status: SHIPPED | OUTPATIENT
Start: 2025-08-08

## 2025-08-08 ASSESSMENT — PAIN DESCRIPTION - DESCRIPTORS: DESCRIPTORS: ACHING

## 2025-08-08 ASSESSMENT — ENCOUNTER SYMPTOMS
VOMITING: 0
ABDOMINAL PAIN: 0
DIARRHEA: 0
CHEST TIGHTNESS: 0
SHORTNESS OF BREATH: 0
NAUSEA: 0

## 2025-08-08 ASSESSMENT — PAIN SCALES - GENERAL: PAINLEVEL_OUTOF10: 7

## 2025-08-08 ASSESSMENT — PAIN - FUNCTIONAL ASSESSMENT: PAIN_FUNCTIONAL_ASSESSMENT: 0-10

## 2025-08-08 ASSESSMENT — PAIN DESCRIPTION - ORIENTATION: ORIENTATION: RIGHT

## 2025-08-15 ENCOUNTER — HOSPITAL ENCOUNTER (EMERGENCY)
Facility: HOSPITAL | Age: 65
Discharge: HOME HEALTH | End: 2025-08-15
Attending: EMERGENCY MEDICINE
Payer: MEDICARE

## 2025-08-15 VITALS
DIASTOLIC BLOOD PRESSURE: 95 MMHG | WEIGHT: 160 LBS | OXYGEN SATURATION: 100 % | HEART RATE: 98 BPM | SYSTOLIC BLOOD PRESSURE: 132 MMHG | TEMPERATURE: 97.6 F | RESPIRATION RATE: 18 BRPM | HEIGHT: 60 IN | BODY MASS INDEX: 31.41 KG/M2

## 2025-08-15 DIAGNOSIS — R23.8 SKIN IRRITATION: Primary | ICD-10-CM

## 2025-08-15 PROCEDURE — 99282 EMERGENCY DEPT VISIT SF MDM: CPT

## 2025-08-25 ENCOUNTER — HOSPITAL ENCOUNTER (EMERGENCY)
Facility: HOSPITAL | Age: 65
Discharge: HOME | End: 2025-08-25
Attending: EMERGENCY MEDICINE
Payer: MEDICARE

## 2025-08-25 VITALS
DIASTOLIC BLOOD PRESSURE: 66 MMHG | OXYGEN SATURATION: 97 % | TEMPERATURE: 97.8 F | RESPIRATION RATE: 17 BRPM | HEART RATE: 96 BPM | SYSTOLIC BLOOD PRESSURE: 129 MMHG

## 2025-08-25 DIAGNOSIS — R39.198 DECREASED URINE STREAM: Primary | ICD-10-CM

## 2025-08-25 LAB
APPEARANCE UR: CLEAR
BILIRUB UR QL: NEGATIVE
COLOR UR: YELLOW
GLUCOSE UR STRIP.AUTO-MCNC: NEGATIVE MG/DL
HGB UR QL STRIP: NEGATIVE
KETONES UR QL STRIP.AUTO: NEGATIVE MG/DL
LEUKOCYTE ESTERASE UR QL STRIP.AUTO: NEGATIVE
NITRITE UR QL STRIP.AUTO: NEGATIVE
PH UR STRIP: 5.5 (ref 5–8)
PROT UR STRIP-MCNC: NEGATIVE MG/DL
SP GR UR REFRACTOMETRY: <1.005 (ref 1–1.03)
UROBILINOGEN UR QL STRIP.AUTO: 0.2 EU/DL (ref 0.2–1)

## 2025-08-25 PROCEDURE — 99283 EMERGENCY DEPT VISIT LOW MDM: CPT

## 2025-08-25 PROCEDURE — 51798 US URINE CAPACITY MEASURE: CPT

## 2025-08-25 PROCEDURE — 81003 URINALYSIS AUTO W/O SCOPE: CPT
